# Patient Record
Sex: FEMALE | Race: BLACK OR AFRICAN AMERICAN | NOT HISPANIC OR LATINO | Employment: OTHER | URBAN - NONMETROPOLITAN AREA
[De-identification: names, ages, dates, MRNs, and addresses within clinical notes are randomized per-mention and may not be internally consistent; named-entity substitution may affect disease eponyms.]

---

## 2023-01-25 PROBLEM — D64.9 ANEMIA: Status: ACTIVE | Noted: 2023-01-25

## 2023-01-25 PROBLEM — E27.9 ADRENAL NODULE: Status: ACTIVE | Noted: 2023-01-25

## 2023-01-25 PROBLEM — E05.90 HYPERTHYROIDISM: Status: ACTIVE | Noted: 2023-01-25

## 2023-01-25 PROBLEM — K21.9 GERD (GASTROESOPHAGEAL REFLUX DISEASE): Status: ACTIVE | Noted: 2023-01-25

## 2023-01-25 PROBLEM — K62.5 RECTAL BLEED: Status: ACTIVE | Noted: 2023-01-25

## 2023-01-25 PROBLEM — M17.9 OA (OSTEOARTHRITIS) OF KNEE: Status: ACTIVE | Noted: 2023-01-25

## 2023-01-25 PROBLEM — E04.1 THYROID NODULE: Status: ACTIVE | Noted: 2023-01-25

## 2023-01-25 PROBLEM — I10 HYPERTENSION: Status: ACTIVE | Noted: 2023-01-25

## 2023-01-25 PROBLEM — M11.269 PSEUDOGOUT OF KNEE: Status: ACTIVE | Noted: 2023-01-25

## 2023-01-25 PROBLEM — I73.9 PAD (PERIPHERAL ARTERY DISEASE) (CMS-HCC): Status: ACTIVE | Noted: 2023-01-25

## 2023-01-25 PROBLEM — I48.91 ATRIAL FIBRILLATION (MULTI): Status: ACTIVE | Noted: 2023-01-25

## 2023-01-25 PROBLEM — E27.8 ADRENAL NODULE (MULTI): Status: ACTIVE | Noted: 2023-01-25

## 2023-01-25 PROBLEM — M25.551 RIGHT HIP PAIN: Status: ACTIVE | Noted: 2023-01-25

## 2023-01-25 PROBLEM — R05.3 CHRONIC COUGH: Status: ACTIVE | Noted: 2023-01-25

## 2023-01-25 RX ORDER — HYDROCHLOROTHIAZIDE 25 MG/1
TABLET ORAL
COMMUNITY
Start: 2015-03-17 | End: 2023-09-01

## 2023-01-25 RX ORDER — ATENOLOL 50 MG/1
TABLET ORAL
COMMUNITY
Start: 2017-12-08 | End: 2023-07-10

## 2023-01-25 RX ORDER — FERROUS SULFATE 325(65) MG
TABLET ORAL
COMMUNITY
Start: 2021-05-04 | End: 2023-03-20 | Stop reason: SDUPTHER

## 2023-01-25 RX ORDER — METHIMAZOLE 5 MG/1
TABLET ORAL
COMMUNITY
Start: 2022-05-02 | End: 2023-12-18 | Stop reason: WASHOUT

## 2023-01-25 RX ORDER — PANTOPRAZOLE SODIUM 40 MG/1
TABLET, DELAYED RELEASE ORAL
COMMUNITY
Start: 2014-04-30 | End: 2024-01-10

## 2023-01-25 RX ORDER — ATORVASTATIN CALCIUM 40 MG/1
TABLET, FILM COATED ORAL
COMMUNITY
Start: 2021-02-15 | End: 2023-10-26

## 2023-03-09 ENCOUNTER — OFFICE VISIT (OUTPATIENT)
Dept: PRIMARY CARE | Facility: CLINIC | Age: 83
End: 2023-03-09
Payer: MEDICARE

## 2023-03-09 DIAGNOSIS — D72.829 LEUKOCYTOSIS, UNSPECIFIED TYPE: ICD-10-CM

## 2023-03-09 PROCEDURE — 99211 OFF/OP EST MAY X REQ PHY/QHP: CPT | Performed by: FAMILY MEDICINE

## 2023-03-09 PROCEDURE — 85025 COMPLETE CBC W/AUTO DIFF WBC: CPT

## 2023-03-09 PROCEDURE — 36415 COLL VENOUS BLD VENIPUNCTURE: CPT | Performed by: FAMILY MEDICINE

## 2023-03-09 NOTE — PROGRESS NOTES
Subjective   Patient ID: Dianne Clay is a 83 y.o. female who presents for Labs Only (CBC).    CBC completed today.  No complications.    LSISETH JUANA

## 2023-03-10 ENCOUNTER — TELEPHONE (OUTPATIENT)
Dept: PRIMARY CARE | Facility: CLINIC | Age: 83
End: 2023-03-10
Payer: MEDICARE

## 2023-03-10 LAB
BASOPHILS (10*3/UL) IN BLOOD BY AUTOMATED COUNT: 0.07 X10E9/L (ref 0–0.1)
BASOPHILS/100 LEUKOCYTES IN BLOOD BY AUTOMATED COUNT: 0.8 % (ref 0–2)
EOSINOPHILS (10*3/UL) IN BLOOD BY AUTOMATED COUNT: 0.23 X10E9/L (ref 0–0.4)
EOSINOPHILS/100 LEUKOCYTES IN BLOOD BY AUTOMATED COUNT: 2.6 % (ref 0–6)
ERYTHROCYTE DISTRIBUTION WIDTH (RATIO) BY AUTOMATED COUNT: 15.6 % (ref 11.5–14.5)
ERYTHROCYTE MEAN CORPUSCULAR HEMOGLOBIN CONCENTRATION (G/DL) BY AUTOMATED: 30.2 G/DL (ref 32–36)
ERYTHROCYTE MEAN CORPUSCULAR VOLUME (FL) BY AUTOMATED COUNT: 96 FL (ref 80–100)
ERYTHROCYTES (10*6/UL) IN BLOOD BY AUTOMATED COUNT: 4.06 X10E12/L (ref 4–5.2)
HEMATOCRIT (%) IN BLOOD BY AUTOMATED COUNT: 39.1 % (ref 36–46)
HEMOGLOBIN (G/DL) IN BLOOD: 11.8 G/DL (ref 12–16)
IMMATURE GRANULOCYTES/100 LEUKOCYTES IN BLOOD BY AUTOMATED COUNT: 0.2 % (ref 0–0.9)
LEUKOCYTES (10*3/UL) IN BLOOD BY AUTOMATED COUNT: 8.8 X10E9/L (ref 4.4–11.3)
LYMPHOCYTES (10*3/UL) IN BLOOD BY AUTOMATED COUNT: 3.23 X10E9/L (ref 0.8–3)
LYMPHOCYTES/100 LEUKOCYTES IN BLOOD BY AUTOMATED COUNT: 36.7 % (ref 13–44)
MONOCYTES (10*3/UL) IN BLOOD BY AUTOMATED COUNT: 0.61 X10E9/L (ref 0.05–0.8)
MONOCYTES/100 LEUKOCYTES IN BLOOD BY AUTOMATED COUNT: 6.9 % (ref 2–10)
NEUTROPHILS (10*3/UL) IN BLOOD BY AUTOMATED COUNT: 4.64 X10E9/L (ref 1.6–5.5)
NEUTROPHILS/100 LEUKOCYTES IN BLOOD BY AUTOMATED COUNT: 52.8 % (ref 40–80)
NRBC (PER 100 WBCS) BY AUTOMATED COUNT: 0 /100 WBC (ref 0–0)
PLATELETS (10*3/UL) IN BLOOD AUTOMATED COUNT: 236 X10E9/L (ref 150–450)

## 2023-03-10 NOTE — TELEPHONE ENCOUNTER
DEBORAH for pt to contact the office.    ----- Message from Jyoti Carbajal DO sent at 3/10/2023  7:41 AM EST -----  HER WBC IS STILL HIGH BUT BACK DOWN LIKE BEFORE 12,000 AND HER DIFFERENTIAL SEMS NORMAL. IS SHE COIMG IN? DOES NOT HAVE TO IF NOT JUST FOLLOW PROBABLY DUE FOR SOME WELLNESS

## 2023-03-10 NOTE — PROGRESS NOTES
Subjective   Patient ID: Dianne Clay is a 83 y.o. female who presents for Labs Only (CBC).    HPI     Review of Systems    Objective   There were no vitals taken for this visit.    Physical Exam    Assessment/Plan

## 2023-03-13 NOTE — TELEPHONE ENCOUNTER
Patient called in and left a vm on the MA line returning call to Irish. Called patient back and left vm for her to return call to office. She is scheduled to come in 5/16/23 and is not due for her wellness until November.     Yessy Avila MA

## 2023-03-15 NOTE — TELEPHONE ENCOUNTER
Spoke with Dianne and she is aware of her results.  Will follow up in May as scheduled.  LISSETH MORGAN

## 2023-03-15 NOTE — TELEPHONE ENCOUNTER
----- Message from Jyoti Carbajal DO sent at 3/13/2023  8:06 AM EDT -----  Her cbc is about the same  so no need to see her

## 2023-03-20 DIAGNOSIS — D50.9 IRON DEFICIENCY ANEMIA, UNSPECIFIED IRON DEFICIENCY ANEMIA TYPE: Primary | ICD-10-CM

## 2023-03-20 RX ORDER — FERROUS SULFATE 325(65) MG
1 TABLET ORAL DAILY
Qty: 90 TABLET | Refills: 1 | Status: SHIPPED | OUTPATIENT
Start: 2023-03-20 | End: 2024-01-10

## 2023-03-24 ENCOUNTER — TELEPHONE (OUTPATIENT)
Dept: PRIMARY CARE | Facility: CLINIC | Age: 83
End: 2023-03-24
Payer: MEDICARE

## 2023-03-24 DIAGNOSIS — I48.91 ATRIAL FIBRILLATION, UNSPECIFIED TYPE (MULTI): ICD-10-CM

## 2023-03-24 RX ORDER — AMIODARONE HYDROCHLORIDE 200 MG/1
200 TABLET ORAL
COMMUNITY
Start: 2023-02-16 | End: 2023-03-27 | Stop reason: SDUPTHER

## 2023-03-27 RX ORDER — AMIODARONE HYDROCHLORIDE 200 MG/1
200 TABLET ORAL
Qty: 30 TABLET | Refills: 0 | Status: SHIPPED | OUTPATIENT
Start: 2023-03-27 | End: 2023-07-03

## 2023-05-16 ENCOUNTER — OFFICE VISIT (OUTPATIENT)
Dept: PRIMARY CARE | Facility: CLINIC | Age: 83
End: 2023-05-16
Payer: MEDICARE

## 2023-05-16 VITALS
BODY MASS INDEX: 22.35 KG/M2 | HEART RATE: 52 BPM | TEMPERATURE: 97 F | WEIGHT: 122.2 LBS | OXYGEN SATURATION: 97 % | DIASTOLIC BLOOD PRESSURE: 60 MMHG | SYSTOLIC BLOOD PRESSURE: 136 MMHG

## 2023-05-16 DIAGNOSIS — K21.9 GASTROESOPHAGEAL REFLUX DISEASE WITHOUT ESOPHAGITIS: ICD-10-CM

## 2023-05-16 DIAGNOSIS — D50.8 IRON DEFICIENCY ANEMIA SECONDARY TO INADEQUATE DIETARY IRON INTAKE: Primary | ICD-10-CM

## 2023-05-16 PROBLEM — R05.3 CHRONIC COUGH: Status: RESOLVED | Noted: 2023-01-25 | Resolved: 2023-05-16

## 2023-05-16 PROCEDURE — 3078F DIAST BP <80 MM HG: CPT | Performed by: FAMILY MEDICINE

## 2023-05-16 PROCEDURE — 1036F TOBACCO NON-USER: CPT | Performed by: FAMILY MEDICINE

## 2023-05-16 PROCEDURE — 3075F SYST BP GE 130 - 139MM HG: CPT | Performed by: FAMILY MEDICINE

## 2023-05-16 PROCEDURE — 1159F MED LIST DOCD IN RCRD: CPT | Performed by: FAMILY MEDICINE

## 2023-05-16 PROCEDURE — 99213 OFFICE O/P EST LOW 20 MIN: CPT | Performed by: FAMILY MEDICINE

## 2023-05-16 RX ORDER — ASPIRIN 81 MG/1
81 TABLET ORAL DAILY
COMMUNITY

## 2023-05-16 ASSESSMENT — ENCOUNTER SYMPTOMS
RHINORRHEA: 0
CONSTIPATION: 0
ACTIVITY CHANGE: 0
LIGHT-HEADEDNESS: 0
SORE THROAT: 0
DIZZINESS: 0
COUGH: 0
JOINT SWELLING: 0
PALPITATIONS: 0
FEVER: 0
WEAKNESS: 0
UNEXPECTED WEIGHT CHANGE: 0
DYSPHORIC MOOD: 0
NERVOUS/ANXIOUS: 0
WHEEZING: 0
SLEEP DISTURBANCE: 0
SINUS PRESSURE: 0
EYE DISCHARGE: 0
NAUSEA: 0
DYSURIA: 0
DIARRHEA: 0
HEMATURIA: 0
MYALGIAS: 0
APPETITE CHANGE: 0
BLOOD IN STOOL: 0
VOMITING: 0
SHORTNESS OF BREATH: 0
HEADACHES: 0
NUMBNESS: 0
ARTHRALGIAS: 0
EYE ITCHING: 0
FLANK PAIN: 0
ABDOMINAL PAIN: 0

## 2023-05-16 NOTE — PROGRESS NOTES
Subjective   Patient ID: Dianne Clay is a 83 y.o. female who presents for Follow-up (6 MONTH FOLLOW UP.HAD RECENT CBC DONE-DISCUSS RESULTS- NO OTHER CONCERNS TODAY).    HPI PATIENT HERE TO FOLLOW ON HER ANEMIA   IT IS A LOT BETTER UPON REVIEW   SHE FEELS FINE, SHE SAYS     Review of Systems   Constitutional:  Negative for activity change, appetite change, fever and unexpected weight change.   HENT:  Negative for congestion, ear pain, postnasal drip, rhinorrhea, sinus pressure and sore throat.    Eyes:  Negative for discharge, itching and visual disturbance.   Respiratory:  Negative for cough, shortness of breath and wheezing.    Cardiovascular:  Negative for chest pain, palpitations and leg swelling.   Gastrointestinal:  Negative for abdominal pain, blood in stool, constipation, diarrhea, nausea and vomiting.   Endocrine: Negative for cold intolerance, heat intolerance and polyuria.   Genitourinary:  Negative for dysuria, flank pain and hematuria.   Musculoskeletal:  Negative for arthralgias, gait problem, joint swelling and myalgias.   Skin:  Negative for rash.   Allergic/Immunologic: Negative for environmental allergies and food allergies.   Neurological:  Negative for dizziness, syncope, weakness, light-headedness, numbness and headaches.   Hematological:         ANEMIA    Psychiatric/Behavioral:  Negative for dysphoric mood and sleep disturbance. The patient is not nervous/anxious.        Objective   /60   Pulse 52   Temp 36.1 °C (97 °F) (Temporal)   Wt 55.4 kg (122 lb 3.2 oz)   LMP  (LMP Unknown)   SpO2 97%   BMI 22.35 kg/m²     Physical Exam  Constitutional:       Appearance: Normal appearance.   HENT:      Head: Normocephalic.      Mouth/Throat:      Mouth: Mucous membranes are moist.   Cardiovascular:      Rate and Rhythm: Normal rate and regular rhythm.      Pulses: Normal pulses.      Heart sounds: Normal heart sounds. No murmur heard.     No friction rub. No gallop.   Pulmonary:      Effort:  Pulmonary effort is normal. No respiratory distress.      Breath sounds: Normal breath sounds. No wheezing.   Abdominal:      General: Bowel sounds are normal. There is no distension.      Palpations: Abdomen is soft.      Tenderness: There is no abdominal tenderness.   Musculoskeletal:         General: No deformity. Normal range of motion.   Skin:     General: Skin is warm and dry.      Capillary Refill: Capillary refill takes less than 2 seconds.   Neurological:      General: No focal deficit present.      Mental Status: She is alert and oriented to person, place, and time.   Psychiatric:         Mood and Affect: Mood normal.         Assessment/Plan   Diagnoses and all orders for this visit:  Iron deficiency anemia secondary to inadequate dietary iron intake

## 2023-07-03 DIAGNOSIS — I48.91 ATRIAL FIBRILLATION, UNSPECIFIED TYPE (MULTI): ICD-10-CM

## 2023-07-03 RX ORDER — AMIODARONE HYDROCHLORIDE 200 MG/1
TABLET ORAL
Qty: 30 TABLET | Refills: 4 | Status: SHIPPED | OUTPATIENT
Start: 2023-07-03 | End: 2024-03-19

## 2023-07-10 DIAGNOSIS — I10 ESSENTIAL (PRIMARY) HYPERTENSION: ICD-10-CM

## 2023-07-10 RX ORDER — ATENOLOL 50 MG/1
TABLET ORAL
Qty: 90 TABLET | Refills: 1 | Status: SHIPPED | OUTPATIENT
Start: 2023-07-10 | End: 2024-02-06

## 2023-09-01 DIAGNOSIS — I10 ESSENTIAL (PRIMARY) HYPERTENSION: ICD-10-CM

## 2023-09-01 RX ORDER — HYDROCHLOROTHIAZIDE 25 MG/1
TABLET ORAL
Qty: 90 TABLET | Refills: 1 | Status: SHIPPED | OUTPATIENT
Start: 2023-09-01 | End: 2023-11-21 | Stop reason: WASHOUT

## 2023-10-26 DIAGNOSIS — I10 ESSENTIAL (PRIMARY) HYPERTENSION: ICD-10-CM

## 2023-10-26 RX ORDER — ATORVASTATIN CALCIUM 40 MG/1
TABLET, FILM COATED ORAL
Qty: 90 TABLET | Refills: 1 | Status: SHIPPED | OUTPATIENT
Start: 2023-10-26

## 2023-11-14 ENCOUNTER — TELEPHONE (OUTPATIENT)
Dept: PRIMARY CARE | Facility: CLINIC | Age: 83
End: 2023-11-14
Payer: MEDICARE

## 2023-11-14 NOTE — TELEPHONE ENCOUNTER
Transition of Care    Inpatient facility: [facility]  Discharge diagnosis: RECTAL BLEEDING  Discharged to: HOME  Discharge date: 11/11/23  Initial Call date: 11/14/23  Spoke with patient/caregiver:   Left Message on date : LEFT MESSAGE FOR PATIENT TO CALL BACK TO OFFICE                                                                    Do you need assistance  visits prior to your PCP visit: No  Home health care ordered: No  Have you been contacted by home care and have a start of care date: No  Are you taking medications as prescribed at discharge: Yes  Patient advised to bring all medications to PCP follow-up appointment.  Patient advised to follow discharge instructions until provider follow-up.  TCM visit date: 11/21/23  TCM provider visit with: Dr. Carbajal

## 2023-11-21 ENCOUNTER — OFFICE VISIT (OUTPATIENT)
Dept: PRIMARY CARE | Facility: CLINIC | Age: 83
End: 2023-11-21
Payer: MEDICARE

## 2023-11-21 VITALS
HEART RATE: 75 BPM | DIASTOLIC BLOOD PRESSURE: 70 MMHG | BODY MASS INDEX: 23.41 KG/M2 | OXYGEN SATURATION: 98 % | TEMPERATURE: 98 F | WEIGHT: 128 LBS | SYSTOLIC BLOOD PRESSURE: 140 MMHG

## 2023-11-21 DIAGNOSIS — R29.898 POLYARTICULAR JOINT INVOLVEMENT: ICD-10-CM

## 2023-11-21 DIAGNOSIS — M70.041 CREPITANT SYNOVITIS (ACUTE) (CHRONIC), RIGHT HAND: Primary | ICD-10-CM

## 2023-11-21 DIAGNOSIS — D50.0 IRON DEFICIENCY ANEMIA DUE TO CHRONIC BLOOD LOSS: ICD-10-CM

## 2023-11-21 PROCEDURE — 3078F DIAST BP <80 MM HG: CPT | Performed by: FAMILY MEDICINE

## 2023-11-21 PROCEDURE — 1036F TOBACCO NON-USER: CPT | Performed by: FAMILY MEDICINE

## 2023-11-21 PROCEDURE — 99214 OFFICE O/P EST MOD 30 MIN: CPT | Performed by: FAMILY MEDICINE

## 2023-11-21 PROCEDURE — 3077F SYST BP >= 140 MM HG: CPT | Performed by: FAMILY MEDICINE

## 2023-11-21 PROCEDURE — 1159F MED LIST DOCD IN RCRD: CPT | Performed by: FAMILY MEDICINE

## 2023-11-21 RX ORDER — ACETAMINOPHEN AND CODEINE PHOSPHATE 300; 30 MG/1; MG/1
1 TABLET ORAL EVERY 6 HOURS PRN
Qty: 20 TABLET | Refills: 0 | Status: SHIPPED | OUTPATIENT
Start: 2023-11-21 | End: 2023-11-28

## 2023-11-21 ASSESSMENT — ENCOUNTER SYMPTOMS
PALPITATIONS: 0
SINUS PRESSURE: 0
MYALGIAS: 1
SLEEP DISTURBANCE: 0
BLOOD IN STOOL: 1
LIGHT-HEADEDNESS: 0
SHORTNESS OF BREATH: 0
CONSTIPATION: 0
ANAL BLEEDING: 1
DIZZINESS: 0
NERVOUS/ANXIOUS: 0
HEADACHES: 0
NUMBNESS: 0
APPETITE CHANGE: 1
UNEXPECTED WEIGHT CHANGE: 0
WEAKNESS: 0
FATIGUE: 1
FEVER: 0
NAUSEA: 0
JOINT SWELLING: 1
ABDOMINAL PAIN: 0
EYE DISCHARGE: 0
DYSPHORIC MOOD: 0
HEMATURIA: 0
SORE THROAT: 0
ABDOMINAL DISTENTION: 1
DYSURIA: 0
RHINORRHEA: 0
VOMITING: 0
ARTHRALGIAS: 1
EYE ITCHING: 0
DIARRHEA: 0
COUGH: 0
WHEEZING: 0
ACTIVITY CHANGE: 0
FLANK PAIN: 0

## 2023-11-21 NOTE — PATIENT INSTRUCTIONS
SENT PAIN PILLS WITH JUNIE  IF WORSE BEFORE OUR NEXT APPOINTMENT GO TO [facility] ER FOR CARE

## 2023-11-21 NOTE — PROGRESS NOTES
Subjective   Patient ID: Dianne Clay is a 83 y.o. female who presents for Hospital Follow-up (11/9/2023 Rectal bleeding.  Bilateral leg pain and R arm pain./Current concerns leg swelling and R arm swelling. Difficulty picking up things after blood transfusion.).    HPI patient here for post hospital visit but she c/o in tears arm pain and swelling and bilateral knee pain and swelling /CAN'T WALK USING CANE   NOF NOTE IN REVIEW OF HER HOSPITAL RECORD FOR GI BLEED SHE REQUIRED BLOOD TRANSFUSIONS (3?) AND STILL HAD LOW IRON AND H-H  SHE WAS ON ELIQUIS FOR A FIB BUT THIS IS STOPPED NOW   SHE IS ON IRON     Review of Systems   Constitutional:  Positive for appetite change and fatigue. Negative for activity change, fever and unexpected weight change.   HENT:  Negative for congestion, ear pain, postnasal drip, rhinorrhea, sinus pressure and sore throat.    Eyes:  Negative for discharge, itching and visual disturbance.   Respiratory:  Negative for cough, shortness of breath and wheezing.    Cardiovascular:  Negative for chest pain, palpitations and leg swelling.   Gastrointestinal:  Positive for abdominal distention, anal bleeding and blood in stool. Negative for abdominal pain, constipation, diarrhea, nausea and vomiting.        SEE HPI  HAD SCOPE BUT CAN NOT FIND RESULTS IN RECORD BUT WILL FOLLOW WITH DR MAYORGA NEXT WEEK    Endocrine: Negative for cold intolerance, heat intolerance and polyuria.   Genitourinary:  Negative for dysuria, flank pain and hematuria.   Musculoskeletal:  Positive for arthralgias, gait problem, joint swelling and myalgias.   Skin:  Positive for pallor. Negative for rash.   Allergic/Immunologic: Negative for environmental allergies and food allergies.   Neurological:  Negative for dizziness, syncope, weakness, light-headedness, numbness and headaches.   Hematological:         SEVERE ANEMIA POST GI BLEED    Psychiatric/Behavioral:  Negative for dysphoric mood and sleep disturbance. The patient  "is not nervous/anxious.        Objective   /70   Pulse 75   Temp 36.7 °C (98 °F)   Wt 58.1 kg (128 lb)   LMP  (LMP Unknown)   SpO2 98%   BMI 23.41 kg/m²     Physical Exam  Vitals and nursing note reviewed.   Constitutional:       General: She is in acute distress.      Appearance: Normal appearance. She is ill-appearing.   HENT:      Head: Normocephalic.      Mouth/Throat:      Mouth: Mucous membranes are moist.   Cardiovascular:      Rate and Rhythm: Normal rate and regular rhythm.      Pulses: Normal pulses.      Heart sounds: Normal heart sounds. No murmur heard.     No friction rub. No gallop.   Pulmonary:      Effort: Pulmonary effort is normal. No respiratory distress.      Breath sounds: Normal breath sounds. No wheezing.   Abdominal:      General: Bowel sounds are normal. There is no distension.      Palpations: Abdomen is soft.      Tenderness: There is no abdominal tenderness.   Musculoskeletal:         General: Swelling, tenderness and deformity present.      Comments: BOTH KNEES AND RIGHT ARM AT THE WRIST SWOLLEN AND WARM TO TOUCH   CAUSE UNKNOWN   ASKED TO DO BLOOD WORK AND PATIENT DECLINED (AGAIN IN TEARS)   ASKED TO GO BACK TO HOSPITAL AND SHE AGAIN DECLINED   \"CAN'T YOU JUST GIVE ME SOMETHING FOR PAIN BESIDE JUST TYLENOL?\"   Skin:     General: Skin is warm and dry.      Capillary Refill: Capillary refill takes less than 2 seconds.   Neurological:      General: No focal deficit present.      Mental Status: She is alert and oriented to person, place, and time. Mental status is at baseline.      Motor: Weakness present.      Gait: Gait abnormal.   Psychiatric:         Mood and Affect: Mood normal.      Comments: PATIENT IN PAIN 10/10 AND IN TEARS          Assessment/Plan   Diagnoses and all orders for this visit:  Crepitant synovitis (acute) (chronic), right hand  -     acetaminophen-codeine (Tylenol w/ Codeine #3) 300-30 mg tablet; Take 1 tablet by mouth every 6 hours if needed for severe " pain (7 - 10) for up to 7 days.  Polyarticular joint involvement  Iron deficiency anemia due to chronic blood loss

## 2023-11-28 ENCOUNTER — APPOINTMENT (OUTPATIENT)
Dept: PRIMARY CARE | Facility: CLINIC | Age: 83
End: 2023-11-28
Payer: MEDICARE

## 2023-12-12 ENCOUNTER — APPOINTMENT (OUTPATIENT)
Dept: PRIMARY CARE | Facility: CLINIC | Age: 83
End: 2023-12-12
Payer: MEDICARE

## 2023-12-18 ENCOUNTER — OFFICE VISIT (OUTPATIENT)
Dept: PRIMARY CARE | Facility: CLINIC | Age: 83
End: 2023-12-18
Payer: MEDICARE

## 2023-12-18 VITALS
HEART RATE: 85 BPM | TEMPERATURE: 98.5 F | WEIGHT: 118 LBS | DIASTOLIC BLOOD PRESSURE: 56 MMHG | BODY MASS INDEX: 21.58 KG/M2 | SYSTOLIC BLOOD PRESSURE: 114 MMHG | OXYGEN SATURATION: 99 %

## 2023-12-18 DIAGNOSIS — M19.049 HAND ARTHRITIS: Primary | ICD-10-CM

## 2023-12-18 DIAGNOSIS — K62.5 RECTAL BLEED: ICD-10-CM

## 2023-12-18 PROCEDURE — 3078F DIAST BP <80 MM HG: CPT | Performed by: FAMILY MEDICINE

## 2023-12-18 PROCEDURE — 3074F SYST BP LT 130 MM HG: CPT | Performed by: FAMILY MEDICINE

## 2023-12-18 PROCEDURE — 99213 OFFICE O/P EST LOW 20 MIN: CPT | Performed by: FAMILY MEDICINE

## 2023-12-18 PROCEDURE — 1036F TOBACCO NON-USER: CPT | Performed by: FAMILY MEDICINE

## 2023-12-18 PROCEDURE — 1159F MED LIST DOCD IN RCRD: CPT | Performed by: FAMILY MEDICINE

## 2023-12-18 ASSESSMENT — ENCOUNTER SYMPTOMS
NAUSEA: 0
NERVOUS/ANXIOUS: 0
ANAL BLEEDING: 1
SINUS PRESSURE: 0
ACTIVITY CHANGE: 0
FLANK PAIN: 0
PALPITATIONS: 0
DYSPHORIC MOOD: 0
MYALGIAS: 0
BLOOD IN STOOL: 1
FEVER: 0
WHEEZING: 0
JOINT SWELLING: 0
SLEEP DISTURBANCE: 0
HEADACHES: 0
CONSTIPATION: 0
ABDOMINAL PAIN: 0
EYE DISCHARGE: 0
COUGH: 0
RHINORRHEA: 0
LIGHT-HEADEDNESS: 0
FATIGUE: 1
VOMITING: 0
ARTHRALGIAS: 1
APPETITE CHANGE: 0
DYSURIA: 0
NUMBNESS: 0
WEAKNESS: 0
SHORTNESS OF BREATH: 0
SORE THROAT: 0
UNEXPECTED WEIGHT CHANGE: 0
ABDOMINAL DISTENTION: 1
DIZZINESS: 0
HEMATURIA: 0
DIARRHEA: 0
EYE ITCHING: 0

## 2023-12-18 NOTE — PROGRESS NOTES
Subjective   Patient ID: Dianne Clay is a 83 y.o. female who presents for Hospital Follow-up (RECTAL BLEEDING AND LEG SWELLING.  SCOPE COMPLETED- NOTHING SIGNIFICANT FOUND. REFERRED TO SEE DR. LEVY NEXT MONTH. CONTINUED RECTAL BLEEDING.  SHE ALSO STATES SHE HAS AFIB AND SHE IS SUPPOSED TO HAVE THE WATCHMAN AND DEFIB PLACED. ).    HPI FEELS BETTER THAN BEFORE THE HOSPITAL. HER LEFT HAND HURTS     Review of Systems   Constitutional:  Positive for fatigue. Negative for activity change, appetite change, fever and unexpected weight change.   HENT:  Negative for congestion, ear pain, postnasal drip, rhinorrhea, sinus pressure and sore throat.    Eyes:  Negative for discharge, itching and visual disturbance.   Respiratory:  Negative for cough, shortness of breath and wheezing.    Cardiovascular:  Negative for chest pain, palpitations and leg swelling.   Gastrointestinal:  Positive for abdominal distention, anal bleeding and blood in stool. Negative for abdominal pain, constipation, diarrhea, nausea and vomiting.   Endocrine: Negative for cold intolerance, heat intolerance and polyuria.   Genitourinary:  Negative for dysuria, flank pain and hematuria.   Musculoskeletal:  Positive for arthralgias. Negative for gait problem, joint swelling and myalgias.   Skin:  Negative for rash.   Allergic/Immunologic: Negative for environmental allergies and food allergies.   Neurological:  Negative for dizziness, syncope, weakness, light-headedness, numbness and headaches.   Psychiatric/Behavioral:  Negative for dysphoric mood and sleep disturbance. The patient is not nervous/anxious.        Objective   /56   Pulse 85   Temp 36.9 °C (98.5 °F)   Wt 53.5 kg (118 lb)   LMP  (LMP Unknown)   SpO2 99%   BMI 21.58 kg/m²     Physical Exam  Vitals and nursing note reviewed.   Constitutional:       Appearance: Normal appearance.   HENT:      Head: Normocephalic.      Mouth/Throat:      Mouth: Mucous membranes are moist.    Cardiovascular:      Rate and Rhythm: Normal rate and regular rhythm.      Pulses: Normal pulses.      Heart sounds: Normal heart sounds. No murmur heard.     No friction rub. No gallop.   Pulmonary:      Effort: Pulmonary effort is normal. No respiratory distress.      Breath sounds: Normal breath sounds. No wheezing.   Abdominal:      General: Bowel sounds are normal. There is no distension.      Palpations: Abdomen is soft.      Tenderness: There is no abdominal tenderness.   Musculoskeletal:         General: Swelling, tenderness and deformity present.      Comments: LEFT WRIST AND PIP JOINTS OF LEFT HAND ARE INFLAMED AND TENDER   SHE IS USING [employer] ON THE HAND AND HEAT    Skin:     General: Skin is warm and dry.      Capillary Refill: Capillary refill takes less than 2 seconds.   Neurological:      General: No focal deficit present.      Mental Status: She is alert and oriented to person, place, and time.   Psychiatric:         Mood and Affect: Mood normal.         Assessment/Plan   Diagnoses and all orders for this visit:  Hand arthritis  Rectal bleed

## 2024-01-03 ENCOUNTER — TELEPHONE (OUTPATIENT)
Dept: PRIMARY CARE | Facility: CLINIC | Age: 84
End: 2024-01-03
Payer: MEDICARE

## 2024-01-03 DIAGNOSIS — I48.91 ATRIAL FIBRILLATION, UNSPECIFIED TYPE (MULTI): Primary | ICD-10-CM

## 2024-01-03 NOTE — TELEPHONE ENCOUNTER
ABY STOLL patient has appointment in March with cardiology do you want her on Eliquis until then?   #[phone]

## 2024-01-04 NOTE — TELEPHONE ENCOUNTER
[facility] TO STAY ON ELIQUIS UNTIL JUANIS SEES CARDIOLOGY. RX SENT TO [facility]

## 2024-01-10 DIAGNOSIS — K21.9 GASTROESOPHAGEAL REFLUX DISEASE WITHOUT ESOPHAGITIS: Primary | ICD-10-CM

## 2024-01-10 DIAGNOSIS — D50.9 IRON DEFICIENCY ANEMIA, UNSPECIFIED IRON DEFICIENCY ANEMIA TYPE: ICD-10-CM

## 2024-01-10 RX ORDER — PANTOPRAZOLE SODIUM 40 MG/1
TABLET, DELAYED RELEASE ORAL
Qty: 30 TABLET | Refills: 2 | Status: SHIPPED | OUTPATIENT
Start: 2024-01-10 | End: 2024-04-29

## 2024-01-10 RX ORDER — FERROUS SULFATE TAB 325 MG (65 MG ELEMENTAL FE) 325 (65 FE) MG
1 TAB ORAL DAILY
Qty: 90 TABLET | Refills: 2 | Status: SHIPPED | OUTPATIENT
Start: 2024-01-10

## 2024-02-05 DIAGNOSIS — I10 ESSENTIAL (PRIMARY) HYPERTENSION: ICD-10-CM

## 2024-02-06 RX ORDER — ATENOLOL 50 MG/1
TABLET ORAL
Qty: 90 TABLET | Refills: 1 | Status: SHIPPED | OUTPATIENT
Start: 2024-02-06

## 2024-02-23 DIAGNOSIS — I48.91 ATRIAL FIBRILLATION, UNSPECIFIED TYPE (MULTI): ICD-10-CM

## 2024-02-23 RX ORDER — APIXABAN 2.5 MG/1
2.5 TABLET, FILM COATED ORAL DAILY
Qty: 90 TABLET | Refills: 0 | Status: SHIPPED | OUTPATIENT
Start: 2024-02-23 | End: 2024-03-01 | Stop reason: SDUPTHER

## 2024-02-27 DIAGNOSIS — I48.91 ATRIAL FIBRILLATION, UNSPECIFIED TYPE (MULTI): ICD-10-CM

## 2024-03-01 DIAGNOSIS — I48.91 ATRIAL FIBRILLATION, UNSPECIFIED TYPE (MULTI): ICD-10-CM

## 2024-03-12 ENCOUNTER — TELEPHONE (OUTPATIENT)
Dept: PRIMARY CARE | Facility: CLINIC | Age: 84
End: 2024-03-12
Payer: MEDICARE

## 2024-03-12 DIAGNOSIS — I48.91 ATRIAL FIBRILLATION, UNSPECIFIED TYPE (MULTI): ICD-10-CM

## 2024-03-12 NOTE — TELEPHONE ENCOUNTER
Macy from [facility] called regarding the script they received for the eliquis 2.5 mg. She states it was sent over as once daily but typically this dose it BID. Asking if this is correct, patient should take one 2.5 mg tablet daily.

## 2024-03-18 DIAGNOSIS — I48.91 ATRIAL FIBRILLATION, UNSPECIFIED TYPE (MULTI): ICD-10-CM

## 2024-03-19 RX ORDER — AMIODARONE HYDROCHLORIDE 200 MG/1
TABLET ORAL
Qty: 30 TABLET | Refills: 4 | Status: SHIPPED | OUTPATIENT
Start: 2024-03-19

## 2024-03-21 DIAGNOSIS — E05.90 THYROTOXICOSIS, UNSPECIFIED WITHOUT THYROTOXIC CRISIS OR STORM: ICD-10-CM

## 2024-03-22 RX ORDER — METHIMAZOLE 5 MG/1
5 TABLET ORAL DAILY
Qty: 30 TABLET | Refills: 8 | Status: SHIPPED | OUTPATIENT
Start: 2024-03-22

## 2024-04-25 DIAGNOSIS — D50.9 IRON DEFICIENCY ANEMIA, UNSPECIFIED IRON DEFICIENCY ANEMIA TYPE: ICD-10-CM

## 2024-04-25 DIAGNOSIS — K21.9 GASTROESOPHAGEAL REFLUX DISEASE WITHOUT ESOPHAGITIS: ICD-10-CM

## 2024-04-29 RX ORDER — PANTOPRAZOLE SODIUM 40 MG/1
TABLET, DELAYED RELEASE ORAL
Qty: 30 TABLET | Refills: 2 | Status: SHIPPED | OUTPATIENT
Start: 2024-04-29

## 2024-07-21 DIAGNOSIS — K21.9 GASTROESOPHAGEAL REFLUX DISEASE WITHOUT ESOPHAGITIS: ICD-10-CM

## 2024-07-21 DIAGNOSIS — D50.9 IRON DEFICIENCY ANEMIA, UNSPECIFIED IRON DEFICIENCY ANEMIA TYPE: ICD-10-CM

## 2024-07-23 RX ORDER — PANTOPRAZOLE SODIUM 40 MG/1
TABLET, DELAYED RELEASE ORAL
Qty: 30 TABLET | Refills: 2 | Status: SHIPPED | OUTPATIENT
Start: 2024-07-23

## 2024-08-21 DIAGNOSIS — I48.91 ATRIAL FIBRILLATION, UNSPECIFIED TYPE (MULTI): ICD-10-CM

## 2024-08-21 RX ORDER — AMIODARONE HYDROCHLORIDE 200 MG/1
TABLET ORAL
Qty: 30 TABLET | Refills: 4 | Status: SHIPPED | OUTPATIENT
Start: 2024-08-21

## 2024-09-03 ENCOUNTER — HOSPITAL ENCOUNTER (EMERGENCY)
Facility: HOSPITAL | Age: 84
Discharge: ANOTHER HOSPITAL | End: 2024-09-04
Attending: EMERGENCY MEDICINE
Payer: MEDICARE

## 2024-09-03 ENCOUNTER — APPOINTMENT (OUTPATIENT)
Dept: RADIOLOGY | Facility: HOSPITAL | Age: 84
End: 2024-09-03
Payer: MEDICARE

## 2024-09-03 DIAGNOSIS — K92.2 LOWER GI BLEED: Primary | ICD-10-CM
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
## 2024-09-03 DIAGNOSIS — Z79.01 ANTICOAGULATED: ICD-10-CM

## 2024-09-03 DIAGNOSIS — S01.81XA FACIAL LACERATION, INITIAL ENCOUNTER: ICD-10-CM

## 2024-09-03 LAB
ABO GROUP (TYPE) IN BLOOD: NORMAL
ALBUMIN SERPL BCP-MCNC: 2.9 G/DL (ref 3.4–5)
ALP SERPL-CCNC: 77 U/L (ref 33–136)
ALT SERPL W P-5'-P-CCNC: 5 U/L (ref 7–45)
ANION GAP SERPL CALC-SCNC: 14 MMOL/L (ref 10–20)
ANTIBODY SCREEN: NORMAL
AST SERPL W P-5'-P-CCNC: 11 U/L (ref 9–39)
BASOPHILS # BLD AUTO: 0.05 X10*3/UL (ref 0–0.1)
BASOPHILS NFR BLD AUTO: 0.4 %
BILIRUB SERPL-MCNC: 0.8 MG/DL (ref 0–1.2)
BUN SERPL-MCNC: 25 MG/DL (ref 6–23)
CALCIUM SERPL-MCNC: 8.5 MG/DL (ref 8.6–10.3)
CHLORIDE SERPL-SCNC: 106 MMOL/L (ref 98–107)
CO2 SERPL-SCNC: 21 MMOL/L (ref 21–32)
CREAT SERPL-MCNC: 1.02 MG/DL (ref 0.5–1.05)
EGFRCR SERPLBLD CKD-EPI 2021: 55 ML/MIN/1.73M*2
EOSINOPHIL # BLD AUTO: 0.07 X10*3/UL (ref 0–0.4)
EOSINOPHIL NFR BLD AUTO: 0.5 %
ERYTHROCYTE [DISTWIDTH] IN BLOOD BY AUTOMATED COUNT: 18.1 % (ref 11.5–14.5)
GLUCOSE BLD MANUAL STRIP-MCNC: 142 MG/DL (ref 74–99)
GLUCOSE SERPL-MCNC: 158 MG/DL (ref 74–99)
HCT VFR BLD AUTO: 26.4 % (ref 36–46)
HGB BLD-MCNC: 8.4 G/DL (ref 12–16)
IMM GRANULOCYTES # BLD AUTO: 0.05 X10*3/UL (ref 0–0.5)
IMM GRANULOCYTES NFR BLD AUTO: 0.4 % (ref 0–0.9)
LYMPHOCYTES # BLD AUTO: 1.89 X10*3/UL (ref 0.8–3)
LYMPHOCYTES NFR BLD AUTO: 14.7 %
MCH RBC QN AUTO: 29 PG (ref 26–34)
MCHC RBC AUTO-ENTMCNC: 31.8 G/DL (ref 32–36)
MCV RBC AUTO: 91 FL (ref 80–100)
MONOCYTES # BLD AUTO: 0.58 X10*3/UL (ref 0.05–0.8)
MONOCYTES NFR BLD AUTO: 4.5 %
NEUTROPHILS # BLD AUTO: 10.21 X10*3/UL (ref 1.6–5.5)
NEUTROPHILS NFR BLD AUTO: 79.5 %
NRBC BLD-RTO: 0 /100 WBCS (ref 0–0)
PLATELET # BLD AUTO: 196 X10*3/UL (ref 150–450)
POTASSIUM SERPL-SCNC: 3.7 MMOL/L (ref 3.5–5.3)
PROT SERPL-MCNC: 7.8 G/DL (ref 6.4–8.2)
RBC # BLD AUTO: 2.9 X10*6/UL (ref 4–5.2)
RH FACTOR (ANTIGEN D): NORMAL
SODIUM SERPL-SCNC: 137 MMOL/L (ref 136–145)
WBC # BLD AUTO: 12.9 X10*3/UL (ref 4.4–11.3)

## 2024-09-03 PROCEDURE — 70450 CT HEAD/BRAIN W/O DYE: CPT | Performed by: RADIOLOGY

## 2024-09-03 PROCEDURE — 36415 COLL VENOUS BLD VENIPUNCTURE: CPT | Performed by: EMERGENCY MEDICINE

## 2024-09-03 PROCEDURE — 82947 ASSAY GLUCOSE BLOOD QUANT: CPT | Mod: 59

## 2024-09-03 PROCEDURE — 72125 CT NECK SPINE W/O DYE: CPT

## 2024-09-03 PROCEDURE — 86901 BLOOD TYPING SEROLOGIC RH(D): CPT | Performed by: EMERGENCY MEDICINE

## 2024-09-03 PROCEDURE — 72125 CT NECK SPINE W/O DYE: CPT | Performed by: RADIOLOGY

## 2024-09-03 PROCEDURE — 86920 COMPATIBILITY TEST SPIN: CPT

## 2024-09-03 PROCEDURE — 99285 EMERGENCY DEPT VISIT HI MDM: CPT

## 2024-09-03 PROCEDURE — 70450 CT HEAD/BRAIN W/O DYE: CPT

## 2024-09-03 PROCEDURE — 85025 COMPLETE CBC W/AUTO DIFF WBC: CPT | Performed by: EMERGENCY MEDICINE

## 2024-09-03 PROCEDURE — 12011 RPR F/E/E/N/L/M 2.5 CM/<: CPT | Performed by: EMERGENCY MEDICINE

## 2024-09-03 PROCEDURE — 80053 COMPREHEN METABOLIC PANEL: CPT | Performed by: EMERGENCY MEDICINE

## 2024-09-03 ASSESSMENT — COLUMBIA-SUICIDE SEVERITY RATING SCALE - C-SSRS
6. HAVE YOU EVER DONE ANYTHING, STARTED TO DO ANYTHING, OR PREPARED TO DO ANYTHING TO END YOUR LIFE?: NO
1. IN THE PAST MONTH, HAVE YOU WISHED YOU WERE DEAD OR WISHED YOU COULD GO TO SLEEP AND NOT WAKE UP?: NO
2. HAVE YOU ACTUALLY HAD ANY THOUGHTS OF KILLING YOURSELF?: NO

## 2024-09-03 ASSESSMENT — PAIN - FUNCTIONAL ASSESSMENT: PAIN_FUNCTIONAL_ASSESSMENT: 0-10

## 2024-09-03 ASSESSMENT — PAIN SCALES - GENERAL: PAINLEVEL_OUTOF10: 0 - NO PAIN

## 2024-09-04 ENCOUNTER — TELEPHONE (OUTPATIENT)
Dept: EMERGENCY MEDICINE | Facility: HOSPITAL | Age: 84
End: 2024-09-04
Payer: MEDICARE

## 2024-09-04 ENCOUNTER — HOSPITAL ENCOUNTER (INPATIENT)
Facility: HOSPITAL | Age: 84
LOS: 4 days | Discharge: HOME | End: 2024-09-08
Attending: INTERNAL MEDICINE | Admitting: INTERNAL MEDICINE
Payer: MEDICARE

## 2024-09-04 VITALS
WEIGHT: 115 LBS | HEIGHT: 65 IN | RESPIRATION RATE: 16 BRPM | HEART RATE: 71 BPM | OXYGEN SATURATION: 99 % | BODY MASS INDEX: 19.16 KG/M2 | SYSTOLIC BLOOD PRESSURE: 125 MMHG | TEMPERATURE: 97.2 F | DIASTOLIC BLOOD PRESSURE: 66 MMHG

## 2024-09-04 DIAGNOSIS — I48.91 ATRIAL FIBRILLATION, UNSPECIFIED TYPE (MULTI): ICD-10-CM

## 2024-09-04 DIAGNOSIS — K92.2 GI BLEED: Primary | ICD-10-CM

## 2024-09-04 LAB
ABO GROUP (TYPE) IN BLOOD: NORMAL
ABO GROUP (TYPE) IN BLOOD: NORMAL
ALBUMIN SERPL BCP-MCNC: 2.8 G/DL (ref 3.4–5)
ALP SERPL-CCNC: 73 U/L (ref 33–136)
ALT SERPL W P-5'-P-CCNC: 5 U/L (ref 7–45)
ANION GAP SERPL CALC-SCNC: 17 MMOL/L (ref 10–20)
ANTIBODY SCREEN: NORMAL
APTT PPP: 27 SECONDS (ref 27–38)
AST SERPL W P-5'-P-CCNC: 12 U/L (ref 9–39)
BASOPHILS # BLD AUTO: 0.05 X10*3/UL (ref 0–0.1)
BASOPHILS NFR BLD AUTO: 0.4 %
BILIRUB SERPL-MCNC: 1.1 MG/DL (ref 0–1.2)
BLOOD EXPIRATION DATE: NORMAL
BUN SERPL-MCNC: 26 MG/DL (ref 6–23)
CALCIUM SERPL-MCNC: 7.5 MG/DL (ref 8.6–10.3)
CHLORIDE SERPL-SCNC: 108 MMOL/L (ref 98–107)
CO2 SERPL-SCNC: 19 MMOL/L (ref 21–32)
CREAT SERPL-MCNC: 0.73 MG/DL (ref 0.5–1.05)
DISPENSE STATUS: NORMAL
EGFRCR SERPLBLD CKD-EPI 2021: 82 ML/MIN/1.73M*2
EOSINOPHIL # BLD AUTO: 0.06 X10*3/UL (ref 0–0.4)
EOSINOPHIL NFR BLD AUTO: 0.5 %
ERYTHROCYTE [DISTWIDTH] IN BLOOD BY AUTOMATED COUNT: 17.5 % (ref 11.5–14.5)
GLUCOSE SERPL-MCNC: 90 MG/DL (ref 74–99)
HCT VFR BLD AUTO: 24.1 % (ref 36–46)
HCT VFR BLD AUTO: 27.6 % (ref 36–46)
HCT VFR BLD AUTO: 29.3 % (ref 36–46)
HGB BLD-MCNC: 8.5 G/DL (ref 12–16)
HGB BLD-MCNC: 9.1 G/DL (ref 12–16)
HGB BLD-MCNC: 9.5 G/DL (ref 12–16)
IMM GRANULOCYTES # BLD AUTO: 0.05 X10*3/UL (ref 0–0.5)
IMM GRANULOCYTES NFR BLD AUTO: 0.4 % (ref 0–0.9)
INR PPP: 1.1 (ref 0.9–1.1)
LYMPHOCYTES # BLD AUTO: 2.17 X10*3/UL (ref 0.8–3)
LYMPHOCYTES NFR BLD AUTO: 18.2 %
MCH RBC QN AUTO: 29.2 PG (ref 26–34)
MCHC RBC AUTO-ENTMCNC: 32.4 G/DL (ref 32–36)
MCV RBC AUTO: 90 FL (ref 80–100)
MONOCYTES # BLD AUTO: 0.72 X10*3/UL (ref 0.05–0.8)
MONOCYTES NFR BLD AUTO: 6 %
NEUTROPHILS # BLD AUTO: 8.86 X10*3/UL (ref 1.6–5.5)
NEUTROPHILS NFR BLD AUTO: 74.5 %
NRBC BLD-RTO: 0 /100 WBCS (ref 0–0)
PLATELET # BLD AUTO: 197 X10*3/UL (ref 150–450)
POTASSIUM SERPL-SCNC: 4 MMOL/L (ref 3.5–5.3)
PRODUCT BLOOD TYPE: 6200
PRODUCT CODE: NORMAL
PROT SERPL-MCNC: 7.2 G/DL (ref 6.4–8.2)
PROTHROMBIN TIME: 12.8 SECONDS (ref 9.8–12.8)
RBC # BLD AUTO: 3.25 X10*6/UL (ref 4–5.2)
RH FACTOR (ANTIGEN D): NORMAL
RH FACTOR (ANTIGEN D): NORMAL
SODIUM SERPL-SCNC: 140 MMOL/L (ref 136–145)
UNIT ABO: NORMAL
UNIT NUMBER: NORMAL
UNIT RH: NORMAL
UNIT VOLUME: 350
WBC # BLD AUTO: 11.9 X10*3/UL (ref 4.4–11.3)
XM INTEP: NORMAL

## 2024-09-04 PROCEDURE — 86901 BLOOD TYPING SEROLOGIC RH(D): CPT | Performed by: NURSE PRACTITIONER

## 2024-09-04 PROCEDURE — 1200000002 HC GENERAL ROOM WITH TELEMETRY DAILY

## 2024-09-04 PROCEDURE — 84075 ASSAY ALKALINE PHOSPHATASE: CPT | Performed by: NURSE PRACTITIONER

## 2024-09-04 PROCEDURE — 2500000004 HC RX 250 GENERAL PHARMACY W/ HCPCS (ALT 636 FOR OP/ED): Performed by: NURSE PRACTITIONER

## 2024-09-04 PROCEDURE — 85014 HEMATOCRIT: CPT | Performed by: NURSE PRACTITIONER

## 2024-09-04 PROCEDURE — 0DJD8ZZ INSPECTION OF LOWER INTESTINAL TRACT, VIA NATURAL OR ARTIFICIAL OPENING ENDOSCOPIC: ICD-10-PCS | Performed by: INTERNAL MEDICINE

## 2024-09-04 PROCEDURE — P9016 RBC LEUKOCYTES REDUCED: HCPCS

## 2024-09-04 PROCEDURE — 85025 COMPLETE CBC W/AUTO DIFF WBC: CPT | Performed by: NURSE PRACTITIONER

## 2024-09-04 PROCEDURE — 2500000001 HC RX 250 WO HCPCS SELF ADMINISTERED DRUGS (ALT 637 FOR MEDICARE OP): Performed by: NURSE PRACTITIONER

## 2024-09-04 PROCEDURE — 36415 COLL VENOUS BLD VENIPUNCTURE: CPT | Performed by: EMERGENCY MEDICINE

## 2024-09-04 PROCEDURE — 36430 TRANSFUSION BLD/BLD COMPNT: CPT

## 2024-09-04 PROCEDURE — 99223 1ST HOSP IP/OBS HIGH 75: CPT | Performed by: NURSE PRACTITIONER

## 2024-09-04 PROCEDURE — 99222 1ST HOSP IP/OBS MODERATE 55: CPT | Performed by: INTERNAL MEDICINE

## 2024-09-04 PROCEDURE — 36415 COLL VENOUS BLD VENIPUNCTURE: CPT | Performed by: NURSE PRACTITIONER

## 2024-09-04 PROCEDURE — 85610 PROTHROMBIN TIME: CPT | Performed by: NURSE PRACTITIONER

## 2024-09-04 PROCEDURE — 85014 HEMATOCRIT: CPT | Performed by: EMERGENCY MEDICINE

## 2024-09-04 RX ORDER — ACETAMINOPHEN 650 MG/1
650 SUPPOSITORY RECTAL EVERY 4 HOURS PRN
Status: DISCONTINUED | OUTPATIENT
Start: 2024-09-04 | End: 2024-09-04

## 2024-09-04 RX ORDER — METHIMAZOLE 5 MG/1
5 TABLET ORAL DAILY
Status: DISCONTINUED | OUTPATIENT
Start: 2024-09-05 | End: 2024-09-08 | Stop reason: HOSPADM

## 2024-09-04 RX ORDER — FERROUS SULFATE 325(65) MG
1 TABLET ORAL DAILY
Status: DISCONTINUED | OUTPATIENT
Start: 2024-09-05 | End: 2024-09-08 | Stop reason: HOSPADM

## 2024-09-04 RX ORDER — PANTOPRAZOLE SODIUM 40 MG/10ML
40 INJECTION, POWDER, LYOPHILIZED, FOR SOLUTION INTRAVENOUS 2 TIMES DAILY
Status: DISCONTINUED | OUTPATIENT
Start: 2024-09-04 | End: 2024-09-08

## 2024-09-04 RX ORDER — ASPIRIN 81 MG/1
81 TABLET ORAL DAILY
Status: DISCONTINUED | OUTPATIENT
Start: 2024-09-04 | End: 2024-09-08 | Stop reason: HOSPADM

## 2024-09-04 RX ORDER — ATORVASTATIN CALCIUM 40 MG/1
40 TABLET, FILM COATED ORAL DAILY
Status: DISCONTINUED | OUTPATIENT
Start: 2024-09-04 | End: 2024-09-08 | Stop reason: HOSPADM

## 2024-09-04 RX ORDER — ACETAMINOPHEN 160 MG/5ML
650 SOLUTION ORAL EVERY 4 HOURS PRN
Status: DISCONTINUED | OUTPATIENT
Start: 2024-09-04 | End: 2024-09-08 | Stop reason: HOSPADM

## 2024-09-04 RX ORDER — ATENOLOL 50 MG/1
50 TABLET ORAL DAILY
Status: DISCONTINUED | OUTPATIENT
Start: 2024-09-05 | End: 2024-09-08 | Stop reason: HOSPADM

## 2024-09-04 RX ORDER — AMIODARONE HYDROCHLORIDE 200 MG/1
200 TABLET ORAL DAILY
Status: DISCONTINUED | OUTPATIENT
Start: 2024-09-05 | End: 2024-09-08 | Stop reason: HOSPADM

## 2024-09-04 RX ORDER — ACETAMINOPHEN 325 MG/1
650 TABLET ORAL EVERY 4 HOURS PRN
Status: DISCONTINUED | OUTPATIENT
Start: 2024-09-04 | End: 2024-09-08 | Stop reason: HOSPADM

## 2024-09-04 RX ADMIN — PANTOPRAZOLE SODIUM 40 MG: 40 INJECTION, POWDER, FOR SOLUTION INTRAVENOUS at 21:54

## 2024-09-04 RX ADMIN — PANTOPRAZOLE SODIUM 40 MG: 40 INJECTION, POWDER, FOR SOLUTION INTRAVENOUS at 14:25

## 2024-09-04 RX ADMIN — ATORVASTATIN CALCIUM 40 MG: 40 TABLET, FILM COATED ORAL at 21:54

## 2024-09-04 SDOH — SOCIAL STABILITY: SOCIAL INSECURITY: ABUSE: ADULT

## 2024-09-04 SDOH — SOCIAL STABILITY: SOCIAL INSECURITY: DO YOU FEEL UNSAFE GOING BACK TO THE PLACE WHERE YOU ARE LIVING?: NO

## 2024-09-04 SDOH — SOCIAL STABILITY: SOCIAL INSECURITY: DO YOU FEEL ANYONE HAS EXPLOITED OR TAKEN ADVANTAGE OF YOU FINANCIALLY OR OF YOUR PERSONAL PROPERTY?: NO

## 2024-09-04 SDOH — SOCIAL STABILITY: SOCIAL INSECURITY: ARE YOU OR HAVE YOU BEEN THREATENED OR ABUSED PHYSICALLY, EMOTIONALLY, OR SEXUALLY BY ANYONE?: NO

## 2024-09-04 SDOH — SOCIAL STABILITY: SOCIAL INSECURITY: WERE YOU ABLE TO COMPLETE ALL THE BEHAVIORAL HEALTH SCREENINGS?: YES

## 2024-09-04 SDOH — SOCIAL STABILITY: SOCIAL INSECURITY: DOES ANYONE TRY TO KEEP YOU FROM HAVING/CONTACTING OTHER FRIENDS OR DOING THINGS OUTSIDE YOUR HOME?: NO

## 2024-09-04 SDOH — SOCIAL STABILITY: SOCIAL INSECURITY: HAVE YOU HAD THOUGHTS OF HARMING ANYONE ELSE?: NO

## 2024-09-04 SDOH — SOCIAL STABILITY: SOCIAL INSECURITY: ARE THERE ANY APPARENT SIGNS OF INJURIES/BEHAVIORS THAT COULD BE RELATED TO ABUSE/NEGLECT?: NO

## 2024-09-04 SDOH — SOCIAL STABILITY: SOCIAL INSECURITY: HAVE YOU HAD ANY THOUGHTS OF HARMING ANYONE ELSE?: NO

## 2024-09-04 SDOH — SOCIAL STABILITY: SOCIAL INSECURITY: HAS ANYONE EVER THREATENED TO HURT YOUR FAMILY OR YOUR PETS?: NO

## 2024-09-04 ASSESSMENT — COGNITIVE AND FUNCTIONAL STATUS - GENERAL
PATIENT BASELINE BEDBOUND: NO
DRESSING REGULAR UPPER BODY CLOTHING: A LITTLE
MOBILITY SCORE: 24
STANDING UP FROM CHAIR USING ARMS: A LITTLE
MOVING TO AND FROM BED TO CHAIR: A LITTLE
TOILETING: A LITTLE
MOBILITY SCORE: 19
DAILY ACTIVITIY SCORE: 20
HELP NEEDED FOR BATHING: A LITTLE
CLIMB 3 TO 5 STEPS WITH RAILING: A LOT
DRESSING REGULAR LOWER BODY CLOTHING: A LITTLE
DAILY ACTIVITIY SCORE: 24
WALKING IN HOSPITAL ROOM: A LITTLE

## 2024-09-04 ASSESSMENT — LIFESTYLE VARIABLES
AUDIT TOTAL SCORE: 0
HAS A RELATIVE, FRIEND, DOCTOR, OR ANOTHER HEALTH PROFESSIONAL EXPRESSED CONCERN ABOUT YOUR DRINKING OR SUGGESTED YOU CUT DOWN: NO
HOW OFTEN DURING THE LAST YEAR HAVE YOU HAD A FEELING OF GUILT OR REMORSE AFTER DRINKING: NEVER
PRESCIPTION_ABUSE_PAST_12_MONTHS: YES
HOW OFTEN DURING THE LAST YEAR HAVE YOU BEEN UNABLE TO REMEMBER WHAT HAPPENED THE NIGHT BEFORE BECAUSE YOU HAD BEEN DRINKING: NEVER
HOW MANY STANDARD DRINKS CONTAINING ALCOHOL DO YOU HAVE ON A TYPICAL DAY: 1 OR 2
HOW OFTEN DURING THE LAST YEAR HAVE YOU NEEDED AN ALCOHOLIC DRINK FIRST THING IN THE MORNING TO GET YOURSELF GOING AFTER A NIGHT OF HEAVY DRINKING: NEVER
HOW OFTEN DO YOU HAVE A DRINK CONTAINING ALCOHOL: 2-4 TIMES A MONTH
AUDIT-C TOTAL SCORE: 2
SUBSTANCE_ABUSE_PAST_12_MONTHS: NO
AUDIT-C TOTAL SCORE: 2
HAVE YOU OR SOMEONE ELSE BEEN INJURED AS A RESULT OF YOUR DRINKING: NO
HOW OFTEN DO YOU HAVE 6 OR MORE DRINKS ON ONE OCCASION: NEVER
HOW OFTEN DURING THE LAST YEAR HAVE YOU FOUND THAT YOU WERE NOT ABLE TO STOP DRINKING ONCE YOU HAD STARTED: NEVER
HOW OFTEN DURING THE LAST YEAR HAVE YOU FAILED TO DO WHAT WAS NORMALLY EXPECTED FROM YOU BECAUSE OF DRINKING: NEVER
SKIP TO QUESTIONS 9-10: 1
AUDIT TOTAL SCORE: 2

## 2024-09-04 ASSESSMENT — ENCOUNTER SYMPTOMS
CARDIOVASCULAR NEGATIVE: 1
RESPIRATORY NEGATIVE: 1
NEUROLOGICAL NEGATIVE: 1
PSYCHIATRIC NEGATIVE: 1
MUSCULOSKELETAL NEGATIVE: 1
HEMATOLOGIC/LYMPHATIC NEGATIVE: 1

## 2024-09-04 ASSESSMENT — ACTIVITIES OF DAILY LIVING (ADL)
BATHING: INDEPENDENT
GROOMING: INDEPENDENT
ADEQUATE_TO_COMPLETE_ADL: YES
DRESSING YOURSELF: INDEPENDENT
JUDGMENT_ADEQUATE_SAFELY_COMPLETE_DAILY_ACTIVITIES: YES
WALKS IN HOME: INDEPENDENT
HEARING - LEFT EAR: FUNCTIONAL
PATIENT'S MEMORY ADEQUATE TO SAFELY COMPLETE DAILY ACTIVITIES?: YES
TOILETING: INDEPENDENT
FEEDING YOURSELF: INDEPENDENT
HEARING - RIGHT EAR: FUNCTIONAL
LACK_OF_TRANSPORTATION: NO

## 2024-09-04 ASSESSMENT — PATIENT HEALTH QUESTIONNAIRE - PHQ9
1. LITTLE INTEREST OR PLEASURE IN DOING THINGS: NOT AT ALL
2. FEELING DOWN, DEPRESSED OR HOPELESS: NOT AT ALL
SUM OF ALL RESPONSES TO PHQ9 QUESTIONS 1 & 2: 0

## 2024-09-04 ASSESSMENT — PAIN - FUNCTIONAL ASSESSMENT: PAIN_FUNCTIONAL_ASSESSMENT: 0-10

## 2024-09-04 ASSESSMENT — COLUMBIA-SUICIDE SEVERITY RATING SCALE - C-SSRS
6. HAVE YOU EVER DONE ANYTHING, STARTED TO DO ANYTHING, OR PREPARED TO DO ANYTHING TO END YOUR LIFE?: NO
2. HAVE YOU ACTUALLY HAD ANY THOUGHTS OF KILLING YOURSELF?: NO
1. IN THE PAST MONTH, HAVE YOU WISHED YOU WERE DEAD OR WISHED YOU COULD GO TO SLEEP AND NOT WAKE UP?: NO

## 2024-09-04 ASSESSMENT — PAIN SCALES - GENERAL
PAINLEVEL_OUTOF10: 0 - NO PAIN
PAINLEVEL_OUTOF10: 0 - NO PAIN

## 2024-09-04 NOTE — ED TRIAGE NOTES
Patient states it started at 1900 yesterday. Feels like she has to have a bowel movement and then dark blood comes out. Was constipated prior for a week

## 2024-09-04 NOTE — CARE PLAN
The patient's goals for the shift include      The clinical goals for the shift include REMAIN HEMODYNAMICALLY STABLE    Over the shift, the patient made progress toward the following goals.   Problem: Fall/Injury  Goal: Not fall by end of shift  Outcome: Progressing  Goal: Be free from injury by end of the shift  Outcome: Progressing  Goal: Verbalize understanding of personal risk factors for fall in the hospital  Outcome: Progressing  Goal: Verbalize understanding of risk factor reduction measures to prevent injury from fall in the home  Outcome: Progressing  Goal: Use assistive devices by end of the shift  Outcome: Progressing  Goal: Pace activities to prevent fatigue by end of the shift  Outcome: Progressing

## 2024-09-04 NOTE — ED NOTES
Daughter Mary notified that patient had a fall and that patient is pending transfer to another facility. Daughter verbalizes understanding, all questions answered.      Melissa Gill RN  09/04/24 0005

## 2024-09-04 NOTE — H&P (VIEW-ONLY)
Reason For Consult  GI bleed    History Of Present Illness  Dianne Clay is a 84 y.o. female with h/o coronary artery disease with stent placed in 2006, hypothyroidism, paroxysmal atrial fibrillation, on Eliquis, HTN, pseudogout, glaucoma, h/o diverticular hemorrhage, s/p cholecystectomy, presenting as transfer from [facility] for rectal bleeding. Pt reported bleeding started 2 days ago, she passed multiple episodes of BRPR associated with abdominal pain, postural lightheadedness. She went to [facility] ED. CT OSH with contrast showed colonic diverticulosis with no findings of acute diverticulitis; examination is limited for evaluation of GI bleed with no noncontrast or arterial phase imaging performed; some mild increased density attenuation in the proximal to mid ascending colon the possibility of active GI bleed is considered. GI recommend transferred to the facility where there is angiography available. Pt stopped bleeding. Declined the transfer and left AMA. She stated the bleeding recurs and she came to [facility]. Her H&H stable. She had a syncopal episode and sustained laceration. CT of the head was negative for bleeding or any fractures. She had another episode of blood per rectum today around 2 pm.   She has h/o GI bleeding in the past, thought to be diverticular. She was recommended watchman procedure, but cancelled the appointent with cardiology.     EGD November 9, 2023 with no source for bleeding or anemia, gastric biopsies negative for H. pylori.     Last colonoscopy July 2021 showed moderate diverticulosis throughout the colon with multiple small to large size diverticuli.  Retroflexed views of the rectum showed small internal hemorrhoids.  The terminal ileum was intubated for several centimeters and appeared normal.    Past Medical History  She has a past medical history of Allergic contact dermatitis, unspecified cause (11/07/2015), Contusion of unspecified knee, initial encounter (07/03/2018),  Effusion, unspecified knee (2019), Encounter for follow-up examination after completed treatment for conditions other than malignant neoplasm (2022), Noninfective gastroenteritis and colitis, unspecified (2022), Noninfective gastroenteritis and colitis, unspecified, Other conditions influencing health status (2021), Other conditions influencing health status, Pain in left knee (2014), Personal history of other diseases of the musculoskeletal system and connective tissue, Personal history of other specified conditions (10/21/2016), Personal history of other specified conditions (2015), and Personal history of pneumonia (recurrent) (2022).    Surgical History  She has a past surgical history that includes Colonoscopy (2014);  section, classic (2014); Cataract extraction (2014); Coronary angioplasty with stent (2014); Other surgical history (2023); Upper gastrointestinal endoscopy; and Upper gastrointestinal endoscopy.     Social History  She reports that she has never smoked. She has never used smokeless tobacco. She reports that she does not currently use alcohol. She reports that she does not use drugs.    Family History  Family History   Problem Relation Name Age of Onset    Other (cardiac disorder) Mother      Other (cardiac disorder) Father      Other (cardiac disorder) Sister      Cancer Sister          Allergies  Bee venom protein (honey bee)    Review of Systems  10 systems reviewed and negative other than HPI     Physical Exam  Physical Exam  Constitutional:       Appearance: Normal appearance.   HENT:      Head: Normocephalic and atraumatic.      Nose: Nose normal.      Mouth/Throat:      Mouth: Mucous membranes are moist.      Pharynx: Oropharynx is clear.   Eyes:      Conjunctiva/sclera: Conjunctivae normal.   Cardiovascular:      Rate and Rhythm: Regular rhythm.      Heart sounds: Normal heart sounds.   Pulmonary:      Effort:  "Pulmonary effort is normal.      Breath sounds: Normal breath sounds.   Abdominal:      General: Bowel sounds are normal. There is no distension.      Palpations: Abdomen is soft.      Tenderness: There is no abdominal tenderness. There is no guarding.   Musculoskeletal:      Cervical back: Neck supple.   Skin:     General: Skin is warm and dry.   Neurological:      General: No focal deficit present.      Mental Status: She is alert and oriented to person, place, and time. Mental status is at baseline.   Psychiatric:         Mood and Affect: Mood normal.         Behavior: Behavior normal.            Last Recorded Vitals  Blood pressure 116/70, pulse 79, temperature 36.2 °C (97.1 °F), resp. rate 18, height 1.65 m (5' 4.96\"), weight 52.2 kg (115 lb), SpO2 99%.    Relevant Results      Scheduled medications  [START ON 9/5/2024] amiodarone, 200 mg, oral, Daily  [Held by provider] apixaban, 2.5 mg, oral, BID  [Held by provider] aspirin, 81 mg, oral, Daily  [START ON 9/5/2024] atenolol, 50 mg, oral, Daily  atorvastatin, 40 mg, oral, Daily  [START ON 9/5/2024] ferrous sulfate (325 mg ferrous sulfate), 1 tablet, oral, Daily  [START ON 9/5/2024] methIMAzole, 5 mg, oral, Daily  pantoprazole, 40 mg, intravenous, BID      Continuous medications     PRN medications  PRN medications: acetaminophen **OR** acetaminophen **OR** acetaminophen  CT head W O contrast trauma protocol    Result Date: 9/3/2024  Interpreted By:  Demond Bauer, STUDY: CT HEAD W/O CONTRAST TRAUMA PROTOCOL; CT CERVICAL SPINE WO IV CONTRAST;  9/3/2024 11:02 pm   INDICATION: Signs/Symptoms:fall on thinners; Signs/Symptoms:fall     COMPARISON: None.   ACCESSION NUMBER(S): [id number]; [id number]   ORDERING CLINICIAN: CONSTANZA ARMSTRONG   TECHNIQUE: Axial noncontrast CT images of head with coronal and sagittal reconstructed images. Axial noncontrast CT images of the cervical spine with coronal and sagittal reconstructed images.   FINDINGS: CT HEAD:   BRAIN PARENCHYMA: " Gray-white differentiation is preserved. No mass-effect, midline shift or effacement of cerebral sulci. Mild periventricular and subcortical white matter hypodensities, nonspecific but often seen in the setting of chronic microangiopathic change.   HEMORRHAGE: No acute intracranial hemorrhage.   VENTRICLES and EXTRA-AXIAL SPACES: The ventricles and sulci are within normal limits for brain volume. No abnormal extra-axial fluid collection.   ORBITS: The visualized orbits and globes are within normal limits.   EXTRACRANIAL SOFT TISSUES: Mild right supraorbital soft tissue swelling.   PARANASAL SINUSES/MASTOIDS: The visualized paranasal sinuses and mastoid air cells are well aerated.   CALVARIUM: No depressed skull fracture.         CT CERVICAL SPINE:   CRANIOCERVICAL JUNCTION: Intact.   ALIGNMENT: No traumatic malalignment or traumatic facet widening.   VERTEBRAE/DISC SPACES: No acute fracture. Vertebral body heights are maintained. Mild-to-moderate disc space height loss with associated degenerative endplate changes and mild bilateral facet arthrosis. No high grade spinal canal stenosis evident by CT.   SOFT TISSUES: Enlarged multinodular thyroid gland. This may be further assessed with outpatient ultrasound.   OTHER: Suggestion of tree-in-bud opacities within the visualized lung apices, possibly reflecting an infectious or inflammatory process.       CT HEAD: 1. No acute intracranial abnormality or calvarial fracture. 2. Mild right supraorbital soft tissue swelling.     CT CERVICAL SPINE: 1. No acute fracture or traumatic malalignment of the cervical spine. 2. Enlarged multinodular thyroid gland, this may be further assessed with outpatient ultrasound. 3. Suggestion of tree-in-bud opacities in the lung apices which may reflect an infectious or inflammatory process.   MACRO: None   Signed by: Demond Bauer 9/3/2024 11:23 PM Dictation workstation:   [id number]    CT cervical spine wo IV contrast    Result Date:  9/3/2024  Interpreted By:  Demond Bauer, STUDY: CT HEAD W/O CONTRAST TRAUMA PROTOCOL; CT CERVICAL SPINE WO IV CONTRAST;  9/3/2024 11:02 pm   INDICATION: Signs/Symptoms:fall on thinners; Signs/Symptoms:fall     COMPARISON: None.   ACCESSION NUMBER(S): [id number]; [id number]   ORDERING CLINICIAN: CONSTANZA ARMSTRONG   TECHNIQUE: Axial noncontrast CT images of head with coronal and sagittal reconstructed images. Axial noncontrast CT images of the cervical spine with coronal and sagittal reconstructed images.   FINDINGS: CT HEAD:   BRAIN PARENCHYMA: Gray-white differentiation is preserved. No mass-effect, midline shift or effacement of cerebral sulci. Mild periventricular and subcortical white matter hypodensities, nonspecific but often seen in the setting of chronic microangiopathic change.   HEMORRHAGE: No acute intracranial hemorrhage.   VENTRICLES and EXTRA-AXIAL SPACES: The ventricles and sulci are within normal limits for brain volume. No abnormal extra-axial fluid collection.   ORBITS: The visualized orbits and globes are within normal limits.   EXTRACRANIAL SOFT TISSUES: Mild right supraorbital soft tissue swelling.   PARANASAL SINUSES/MASTOIDS: The visualized paranasal sinuses and mastoid air cells are well aerated.   CALVARIUM: No depressed skull fracture.         CT CERVICAL SPINE:   CRANIOCERVICAL JUNCTION: Intact.   ALIGNMENT: No traumatic malalignment or traumatic facet widening.   VERTEBRAE/DISC SPACES: No acute fracture. Vertebral body heights are maintained. Mild-to-moderate disc space height loss with associated degenerative endplate changes and mild bilateral facet arthrosis. No high grade spinal canal stenosis evident by CT.   SOFT TISSUES: Enlarged multinodular thyroid gland. This may be further assessed with outpatient ultrasound.   OTHER: Suggestion of tree-in-bud opacities within the visualized lung apices, possibly reflecting an infectious or inflammatory process.       CT HEAD: 1. No acute  intracranial abnormality or calvarial fracture. 2. Mild right supraorbital soft tissue swelling.     CT CERVICAL SPINE: 1. No acute fracture or traumatic malalignment of the cervical spine. 2. Enlarged multinodular thyroid gland, this may be further assessed with outpatient ultrasound. 3. Suggestion of tree-in-bud opacities in the lung apices which may reflect an infectious or inflammatory process.   MACRO: None   Signed by: Demond Bauer 9/3/2024 11:23 PM Dictation workstation:   [id number]    CT abdomen pelvis w IV contrast    Result Date: 9/3/2024  * * *Final Report* * * DATE OF EXAM: Sep  3 2024  3:27AM   ASC   0530  -  CT ABD/PEL W IVCON  / ACCESSION #  [id number] PROCEDURE REASON: GI bleed      * * * * Physician Interpretation * * * *  EXAMINATION:  CT ABDOMEN AND PELVIS WITH IV CONTRAST CLINICAL HISTORY: PATIENT/TECHNOLOGIST PROVIDED HISTORY:   rectal bleeding since 7pm yesterday- pt denies any abd pain- hx of rectal bleeding CLINICAL INFORMATION (AS PROVIDED BY ORDERING CLINICIAN) :  GI bleed TECHNIQUE: CT of the abdomen and pelvis was performed using standard technique, scanning from just above the dome of the diaphragm to the symphysis pubis.  Coronal and sagittal reconstructed images generated. MQ:  CTAP_3 Contrast: IV:  100 ml of Omnipaque 350 Oral:  None CT Radiation dose: Integrated Dose-length product (DLP) for this visit =   242 mGy*cm. CT Dose Reduction Employed: Automated exposure control (AEC) COMPARISON: 11/08/2023 RESULT: Liver: 1.4 cm hypodensity left lobe most likely represents a cyst.   Otherwise unremarkable. Biliary: Gallbladder is absent.  Mild intrahepatic biliary duct dilatation likely related to postcholecystectomy state. Spleen: No mass. No splenomegaly. Pancreas: No mass or duct dilation. Adrenals: Right adrenal gland unremarkable.  Stable 1.7 cm left adrenal gland nodule. Kidneys: Enhance symmetrically.  No hydronephrosis.  Subcentimeter hypodensities right kidney are too small to  accurately characterize. GI tract: No dilation or wall thickening. Appendix not visualized.   Colonic diverticulosis with no findings of acute diverticulitis.   Examination is limited for evaluation of GI bleed with no noncontrast or arterial phase imaging performed.  In light of this limitation there is   some mild increased density attenuation in the proximal to mid ascending colon (2:46-53, series 601 image 46-51 and series 602 imaged 25-31) and the possibility of active GI bleed is considered. Lymph nodes: No lymphadenopathy identified. Mesentery/Peritoneum: No free air.  No ascites. Retroperitoneum: No mass. Vasculature:  - Abdominal aorta and iliac arteries: Atherosclerotic calcifications without aneurysm.  - Celiac and SMA: Patent.  - Portal venous system (SMV, splenic vein, portal vein and branches): Patent.  - Hepatic veins: Patent. Pelvis: Urinary bladder is predominantly collapsed and otherwise unremarkable.  There are a couple calcified uterine fibroids. Bones: Osteopenia.  Degenerative changes. Lower thorax: Mild scarring right middle lobe and lingula.  No focal consolidation.  No pleural effusion. Localizer images: No additional findings.    IMPRESSION: Examination is limited for evaluation of GI bleed with no noncontrast or arterial phase imaging performed.  In light of this limitation there is some mild increased density attenuation in the proximal to mid ascending colon (2:46-53, series 601 image 46-51 and series 602 image 25-31) and the possibility of active GI bleed is considered. Diverticulosis with no findings of acute diverticulitis. No bowel obstruction or free air. Details above. Follow-up as indicated. CRITICAL TEST/RESULTS: Acuity: Critical Finding: Active (arterial) bleeding, e.g. GI, retroperitoneal, aortic rupture, leaking/ruptured aneurysm, etc. Communication:  Communicated with GURINDER CUENCA on  9/3/2024 5:01 AM   via verbal communication. --END OF FINDING-- : NORMAN    Transcribe Date/Time: Sep  3 2024  4:38A Dictated by : PANKAJ PALOMINO MD This examination was interpreted and the report reviewed and electronically signed by: PANKAJ PALOMINO MD on Sep  3 2024  5:06AM  EST   Results for orders placed or performed during the hospital encounter of 09/04/24 (from the past 24 hour(s))   CBC and Auto Differential   Result Value Ref Range    WBC 11.9 (H) 4.4 - 11.3 x10*3/uL    nRBC 0.0 0.0 - 0.0 /100 WBCs    RBC 3.25 (L) 4.00 - 5.20 x10*6/uL    Hemoglobin 9.5 (L) 12.0 - 16.0 g/dL    Hematocrit 29.3 (L) 36.0 - 46.0 %    MCV 90 80 - 100 fL    MCH 29.2 26.0 - 34.0 pg    MCHC 32.4 32.0 - 36.0 g/dL    RDW 17.5 (H) 11.5 - 14.5 %    Platelets 197 150 - 450 x10*3/uL    Neutrophils % 74.5 40.0 - 80.0 %    Immature Granulocytes %, Automated 0.4 0.0 - 0.9 %    Lymphocytes % 18.2 13.0 - 44.0 %    Monocytes % 6.0 2.0 - 10.0 %    Eosinophils % 0.5 0.0 - 6.0 %    Basophils % 0.4 0.0 - 2.0 %    Neutrophils Absolute 8.86 (H) 1.60 - 5.50 x10*3/uL    Immature Granulocytes Absolute, Automated 0.05 0.00 - 0.50 x10*3/uL    Lymphocytes Absolute 2.17 0.80 - 3.00 x10*3/uL    Monocytes Absolute 0.72 0.05 - 0.80 x10*3/uL    Eosinophils Absolute 0.06 0.00 - 0.40 x10*3/uL    Basophils Absolute 0.05 0.00 - 0.10 x10*3/uL   Comprehensive Metabolic Panel   Result Value Ref Range    Glucose 90 74 - 99 mg/dL    Sodium 140 136 - 145 mmol/L    Potassium 4.0 3.5 - 5.3 mmol/L    Chloride 108 (H) 98 - 107 mmol/L    Bicarbonate 19 (L) 21 - 32 mmol/L    Anion Gap 17 10 - 20 mmol/L    Urea Nitrogen 26 (H) 6 - 23 mg/dL    Creatinine 0.73 0.50 - 1.05 mg/dL    eGFR 82 >60 mL/min/1.73m*2    Calcium 7.5 (L) 8.6 - 10.3 mg/dL    Albumin 2.8 (L) 3.4 - 5.0 g/dL    Alkaline Phosphatase 73 33 - 136 U/L    Total Protein 7.2 6.4 - 8.2 g/dL    AST 12 9 - 39 U/L    Bilirubin, Total 1.1 0.0 - 1.2 mg/dL    ALT 5 (L) 7 - 45 U/L   Coagulation Screen   Result Value Ref Range    Protime 12.8 9.8 - 12.8 seconds    INR 1.1 0.9 - 1.1    aPTT 27 27 - 38  seconds          Assessment/Plan     85 yo female presented with acute GI bleeding, likely lower bleeding in the setting of diverticulosis and anticoagulation, with possible active bleeding in the proximal to mid ascending colon. H&H stable    -ok for clear liquid diet  -monitor H&H and transfuse as needed to keep Hgb>7  -hold eliquis  -consider IR consultation for possible embolization      I spent 45 minutes in the professional and overall care of this patient.      Florinda Navarro, JEREMY-CNP     alert

## 2024-09-04 NOTE — CONSULTS
Reason For Consult  GI bleed    History Of Present Illness  Dianne Clay is a 84 y.o. female with h/o coronary artery disease with stent placed in 2006, hypothyroidism, paroxysmal atrial fibrillation, on Eliquis, HTN, pseudogout, glaucoma, h/o diverticular hemorrhage, s/p cholecystectomy, presenting as transfer from [facility] for rectal bleeding. Pt reported bleeding started 2 days ago, she passed multiple episodes of BRPR associated with abdominal pain, postural lightheadedness. She went to [facility] ED. CT OSH with contrast showed colonic diverticulosis with no findings of acute diverticulitis; examination is limited for evaluation of GI bleed with no noncontrast or arterial phase imaging performed; some mild increased density attenuation in the proximal to mid ascending colon the possibility of active GI bleed is considered. GI recommend transferred to the facility where there is angiography available. Pt stopped bleeding. Declined the transfer and left AMA. She stated the bleeding recurs and she came to [facility]. Her H&H stable. She had a syncopal episode and sustained laceration. CT of the head was negative for bleeding or any fractures. She had another episode of blood per rectum today around 2 pm.   She has h/o GI bleeding in the past, thought to be diverticular. She was recommended watchman procedure, but cancelled the appointent with cardiology.     EGD November 9, 2023 with no source for bleeding or anemia, gastric biopsies negative for H. pylori.     Last colonoscopy July 2021 showed moderate diverticulosis throughout the colon with multiple small to large size diverticuli.  Retroflexed views of the rectum showed small internal hemorrhoids.  The terminal ileum was intubated for several centimeters and appeared normal.    Past Medical History  She has a past medical history of Allergic contact dermatitis, unspecified cause (11/07/2015), Contusion of unspecified knee, initial encounter (07/03/2018),  Effusion, unspecified knee (2019), Encounter for follow-up examination after completed treatment for conditions other than malignant neoplasm (2022), Noninfective gastroenteritis and colitis, unspecified (2022), Noninfective gastroenteritis and colitis, unspecified, Other conditions influencing health status (2021), Other conditions influencing health status, Pain in left knee (2014), Personal history of other diseases of the musculoskeletal system and connective tissue, Personal history of other specified conditions (10/21/2016), Personal history of other specified conditions (2015), and Personal history of pneumonia (recurrent) (2022).    Surgical History  She has a past surgical history that includes Colonoscopy (2014);  section, classic (2014); Cataract extraction (2014); Coronary angioplasty with stent (2014); Other surgical history (2023); Upper gastrointestinal endoscopy; and Upper gastrointestinal endoscopy.     Social History  She reports that she has never smoked. She has never used smokeless tobacco. She reports that she does not currently use alcohol. She reports that she does not use drugs.    Family History  Family History   Problem Relation Name Age of Onset    Other (cardiac disorder) Mother      Other (cardiac disorder) Father      Other (cardiac disorder) Sister      Cancer Sister          Allergies  Bee venom protein (honey bee)    Review of Systems  10 systems reviewed and negative other than HPI     Physical Exam  Physical Exam  Constitutional:       Appearance: Normal appearance.   HENT:      Head: Normocephalic and atraumatic.      Nose: Nose normal.      Mouth/Throat:      Mouth: Mucous membranes are moist.      Pharynx: Oropharynx is clear.   Eyes:      Conjunctiva/sclera: Conjunctivae normal.   Cardiovascular:      Rate and Rhythm: Regular rhythm.      Heart sounds: Normal heart sounds.   Pulmonary:      Effort:  "Pulmonary effort is normal.      Breath sounds: Normal breath sounds.   Abdominal:      General: Bowel sounds are normal. There is no distension.      Palpations: Abdomen is soft.      Tenderness: There is no abdominal tenderness. There is no guarding.   Musculoskeletal:      Cervical back: Neck supple.   Skin:     General: Skin is warm and dry.   Neurological:      General: No focal deficit present.      Mental Status: She is alert and oriented to person, place, and time. Mental status is at baseline.   Psychiatric:         Mood and Affect: Mood normal.         Behavior: Behavior normal.            Last Recorded Vitals  Blood pressure 116/70, pulse 79, temperature 36.2 °C (97.1 °F), resp. rate 18, height 1.65 m (5' 4.96\"), weight 52.2 kg (115 lb), SpO2 99%.    Relevant Results      Scheduled medications  [START ON 9/5/2024] amiodarone, 200 mg, oral, Daily  [Held by provider] apixaban, 2.5 mg, oral, BID  [Held by provider] aspirin, 81 mg, oral, Daily  [START ON 9/5/2024] atenolol, 50 mg, oral, Daily  atorvastatin, 40 mg, oral, Daily  [START ON 9/5/2024] ferrous sulfate (325 mg ferrous sulfate), 1 tablet, oral, Daily  [START ON 9/5/2024] methIMAzole, 5 mg, oral, Daily  pantoprazole, 40 mg, intravenous, BID      Continuous medications     PRN medications  PRN medications: acetaminophen **OR** acetaminophen **OR** acetaminophen  CT head W O contrast trauma protocol    Result Date: 9/3/2024  Interpreted By:  Demond Bauer, STUDY: CT HEAD W/O CONTRAST TRAUMA PROTOCOL; CT CERVICAL SPINE WO IV CONTRAST;  9/3/2024 11:02 pm   INDICATION: Signs/Symptoms:fall on thinners; Signs/Symptoms:fall     COMPARISON: None.   ACCESSION NUMBER(S): [id number]; [id number]   ORDERING CLINICIAN: CONSTANZA ARMSTRONG   TECHNIQUE: Axial noncontrast CT images of head with coronal and sagittal reconstructed images. Axial noncontrast CT images of the cervical spine with coronal and sagittal reconstructed images.   FINDINGS: CT HEAD:   BRAIN PARENCHYMA: " Gray-white differentiation is preserved. No mass-effect, midline shift or effacement of cerebral sulci. Mild periventricular and subcortical white matter hypodensities, nonspecific but often seen in the setting of chronic microangiopathic change.   HEMORRHAGE: No acute intracranial hemorrhage.   VENTRICLES and EXTRA-AXIAL SPACES: The ventricles and sulci are within normal limits for brain volume. No abnormal extra-axial fluid collection.   ORBITS: The visualized orbits and globes are within normal limits.   EXTRACRANIAL SOFT TISSUES: Mild right supraorbital soft tissue swelling.   PARANASAL SINUSES/MASTOIDS: The visualized paranasal sinuses and mastoid air cells are well aerated.   CALVARIUM: No depressed skull fracture.         CT CERVICAL SPINE:   CRANIOCERVICAL JUNCTION: Intact.   ALIGNMENT: No traumatic malalignment or traumatic facet widening.   VERTEBRAE/DISC SPACES: No acute fracture. Vertebral body heights are maintained. Mild-to-moderate disc space height loss with associated degenerative endplate changes and mild bilateral facet arthrosis. No high grade spinal canal stenosis evident by CT.   SOFT TISSUES: Enlarged multinodular thyroid gland. This may be further assessed with outpatient ultrasound.   OTHER: Suggestion of tree-in-bud opacities within the visualized lung apices, possibly reflecting an infectious or inflammatory process.       CT HEAD: 1. No acute intracranial abnormality or calvarial fracture. 2. Mild right supraorbital soft tissue swelling.     CT CERVICAL SPINE: 1. No acute fracture or traumatic malalignment of the cervical spine. 2. Enlarged multinodular thyroid gland, this may be further assessed with outpatient ultrasound. 3. Suggestion of tree-in-bud opacities in the lung apices which may reflect an infectious or inflammatory process.   MACRO: None   Signed by: Demond Bauer 9/3/2024 11:23 PM Dictation workstation:   [id number]    CT cervical spine wo IV contrast    Result Date:  9/3/2024  Interpreted By:  Demond Bauer, STUDY: CT HEAD W/O CONTRAST TRAUMA PROTOCOL; CT CERVICAL SPINE WO IV CONTRAST;  9/3/2024 11:02 pm   INDICATION: Signs/Symptoms:fall on thinners; Signs/Symptoms:fall     COMPARISON: None.   ACCESSION NUMBER(S): [id number]; [id number]   ORDERING CLINICIAN: CONSTANZA ARMSTRONG   TECHNIQUE: Axial noncontrast CT images of head with coronal and sagittal reconstructed images. Axial noncontrast CT images of the cervical spine with coronal and sagittal reconstructed images.   FINDINGS: CT HEAD:   BRAIN PARENCHYMA: Gray-white differentiation is preserved. No mass-effect, midline shift or effacement of cerebral sulci. Mild periventricular and subcortical white matter hypodensities, nonspecific but often seen in the setting of chronic microangiopathic change.   HEMORRHAGE: No acute intracranial hemorrhage.   VENTRICLES and EXTRA-AXIAL SPACES: The ventricles and sulci are within normal limits for brain volume. No abnormal extra-axial fluid collection.   ORBITS: The visualized orbits and globes are within normal limits.   EXTRACRANIAL SOFT TISSUES: Mild right supraorbital soft tissue swelling.   PARANASAL SINUSES/MASTOIDS: The visualized paranasal sinuses and mastoid air cells are well aerated.   CALVARIUM: No depressed skull fracture.         CT CERVICAL SPINE:   CRANIOCERVICAL JUNCTION: Intact.   ALIGNMENT: No traumatic malalignment or traumatic facet widening.   VERTEBRAE/DISC SPACES: No acute fracture. Vertebral body heights are maintained. Mild-to-moderate disc space height loss with associated degenerative endplate changes and mild bilateral facet arthrosis. No high grade spinal canal stenosis evident by CT.   SOFT TISSUES: Enlarged multinodular thyroid gland. This may be further assessed with outpatient ultrasound.   OTHER: Suggestion of tree-in-bud opacities within the visualized lung apices, possibly reflecting an infectious or inflammatory process.       CT HEAD: 1. No acute  intracranial abnormality or calvarial fracture. 2. Mild right supraorbital soft tissue swelling.     CT CERVICAL SPINE: 1. No acute fracture or traumatic malalignment of the cervical spine. 2. Enlarged multinodular thyroid gland, this may be further assessed with outpatient ultrasound. 3. Suggestion of tree-in-bud opacities in the lung apices which may reflect an infectious or inflammatory process.   MACRO: None   Signed by: Demond Bauer 9/3/2024 11:23 PM Dictation workstation:   [id number]    CT abdomen pelvis w IV contrast    Result Date: 9/3/2024  * * *Final Report* * * DATE OF EXAM: Sep  3 2024  3:27AM   ASC   0530  -  CT ABD/PEL W IVCON  / ACCESSION #  [id number] PROCEDURE REASON: GI bleed      * * * * Physician Interpretation * * * *  EXAMINATION:  CT ABDOMEN AND PELVIS WITH IV CONTRAST CLINICAL HISTORY: PATIENT/TECHNOLOGIST PROVIDED HISTORY:   rectal bleeding since 7pm yesterday- pt denies any abd pain- hx of rectal bleeding CLINICAL INFORMATION (AS PROVIDED BY ORDERING CLINICIAN) :  GI bleed TECHNIQUE: CT of the abdomen and pelvis was performed using standard technique, scanning from just above the dome of the diaphragm to the symphysis pubis.  Coronal and sagittal reconstructed images generated. MQ:  CTAP_3 Contrast: IV:  100 ml of Omnipaque 350 Oral:  None CT Radiation dose: Integrated Dose-length product (DLP) for this visit =   242 mGy*cm. CT Dose Reduction Employed: Automated exposure control (AEC) COMPARISON: 11/08/2023 RESULT: Liver: 1.4 cm hypodensity left lobe most likely represents a cyst.   Otherwise unremarkable. Biliary: Gallbladder is absent.  Mild intrahepatic biliary duct dilatation likely related to postcholecystectomy state. Spleen: No mass. No splenomegaly. Pancreas: No mass or duct dilation. Adrenals: Right adrenal gland unremarkable.  Stable 1.7 cm left adrenal gland nodule. Kidneys: Enhance symmetrically.  No hydronephrosis.  Subcentimeter hypodensities right kidney are too small to  accurately characterize. GI tract: No dilation or wall thickening. Appendix not visualized.   Colonic diverticulosis with no findings of acute diverticulitis.   Examination is limited for evaluation of GI bleed with no noncontrast or arterial phase imaging performed.  In light of this limitation there is   some mild increased density attenuation in the proximal to mid ascending colon (2:46-53, series 601 image 46-51 and series 602 imaged 25-31) and the possibility of active GI bleed is considered. Lymph nodes: No lymphadenopathy identified. Mesentery/Peritoneum: No free air.  No ascites. Retroperitoneum: No mass. Vasculature:  - Abdominal aorta and iliac arteries: Atherosclerotic calcifications without aneurysm.  - Celiac and SMA: Patent.  - Portal venous system (SMV, splenic vein, portal vein and branches): Patent.  - Hepatic veins: Patent. Pelvis: Urinary bladder is predominantly collapsed and otherwise unremarkable.  There are a couple calcified uterine fibroids. Bones: Osteopenia.  Degenerative changes. Lower thorax: Mild scarring right middle lobe and lingula.  No focal consolidation.  No pleural effusion. Localizer images: No additional findings.    IMPRESSION: Examination is limited for evaluation of GI bleed with no noncontrast or arterial phase imaging performed.  In light of this limitation there is some mild increased density attenuation in the proximal to mid ascending colon (2:46-53, series 601 image 46-51 and series 602 image 25-31) and the possibility of active GI bleed is considered. Diverticulosis with no findings of acute diverticulitis. No bowel obstruction or free air. Details above. Follow-up as indicated. CRITICAL TEST/RESULTS: Acuity: Critical Finding: Active (arterial) bleeding, e.g. GI, retroperitoneal, aortic rupture, leaking/ruptured aneurysm, etc. Communication:  Communicated with GURINDER CUENCA on  9/3/2024 5:01 AM   via verbal communication. --END OF FINDING-- : NORMAN    Transcribe Date/Time: Sep  3 2024  4:38A Dictated by : PANKAJ PALOMINO MD This examination was interpreted and the report reviewed and electronically signed by: PANKAJ PALOMINO MD on Sep  3 2024  5:06AM  EST   Results for orders placed or performed during the hospital encounter of 09/04/24 (from the past 24 hour(s))   CBC and Auto Differential   Result Value Ref Range    WBC 11.9 (H) 4.4 - 11.3 x10*3/uL    nRBC 0.0 0.0 - 0.0 /100 WBCs    RBC 3.25 (L) 4.00 - 5.20 x10*6/uL    Hemoglobin 9.5 (L) 12.0 - 16.0 g/dL    Hematocrit 29.3 (L) 36.0 - 46.0 %    MCV 90 80 - 100 fL    MCH 29.2 26.0 - 34.0 pg    MCHC 32.4 32.0 - 36.0 g/dL    RDW 17.5 (H) 11.5 - 14.5 %    Platelets 197 150 - 450 x10*3/uL    Neutrophils % 74.5 40.0 - 80.0 %    Immature Granulocytes %, Automated 0.4 0.0 - 0.9 %    Lymphocytes % 18.2 13.0 - 44.0 %    Monocytes % 6.0 2.0 - 10.0 %    Eosinophils % 0.5 0.0 - 6.0 %    Basophils % 0.4 0.0 - 2.0 %    Neutrophils Absolute 8.86 (H) 1.60 - 5.50 x10*3/uL    Immature Granulocytes Absolute, Automated 0.05 0.00 - 0.50 x10*3/uL    Lymphocytes Absolute 2.17 0.80 - 3.00 x10*3/uL    Monocytes Absolute 0.72 0.05 - 0.80 x10*3/uL    Eosinophils Absolute 0.06 0.00 - 0.40 x10*3/uL    Basophils Absolute 0.05 0.00 - 0.10 x10*3/uL   Comprehensive Metabolic Panel   Result Value Ref Range    Glucose 90 74 - 99 mg/dL    Sodium 140 136 - 145 mmol/L    Potassium 4.0 3.5 - 5.3 mmol/L    Chloride 108 (H) 98 - 107 mmol/L    Bicarbonate 19 (L) 21 - 32 mmol/L    Anion Gap 17 10 - 20 mmol/L    Urea Nitrogen 26 (H) 6 - 23 mg/dL    Creatinine 0.73 0.50 - 1.05 mg/dL    eGFR 82 >60 mL/min/1.73m*2    Calcium 7.5 (L) 8.6 - 10.3 mg/dL    Albumin 2.8 (L) 3.4 - 5.0 g/dL    Alkaline Phosphatase 73 33 - 136 U/L    Total Protein 7.2 6.4 - 8.2 g/dL    AST 12 9 - 39 U/L    Bilirubin, Total 1.1 0.0 - 1.2 mg/dL    ALT 5 (L) 7 - 45 U/L   Coagulation Screen   Result Value Ref Range    Protime 12.8 9.8 - 12.8 seconds    INR 1.1 0.9 - 1.1    aPTT 27 27 - 38  seconds          Assessment/Plan     83 yo female presented with acute GI bleeding, likely lower bleeding in the setting of diverticulosis and anticoagulation, with possible active bleeding in the proximal to mid ascending colon. H&H stable    -ok for clear liquid diet  -monitor H&H and transfuse as needed to keep Hgb>7  -hold eliquis  -consider IR consultation for possible embolization      I spent 45 minutes in the professional and overall care of this patient.      Florinda Navarro, JEREMY-CNP

## 2024-09-04 NOTE — ED PROVIDER NOTES
HPI   Chief Complaint   Patient presents with    Rectal Bleeding     Patient states it started at 1900 yesterday. Feels like she has to have a bowel movement and then dark blood comes out. Was constipated prior for a week        HPI  Patient is an 84-year-old female brought to the ED today via EMS for GI bleed with blood per rectum.  Patient explains that she started having blood per rectum about 27 hours ago.  She is anticoagulated on Eliquis.  She notes that she has had about 15 episodes of blood per rectum throughout this time, which she describes as dark red blood every time she feels like she is trying to have a bowel movement.  She was seen at [facility] for the symptoms early last night/this morning.  She had been accepted for transfer to [facility], but left AMA as she did not want to go to [facility].  She comes to the ED today due to continued symptoms.  She otherwise denies any abdominal pain, vomiting, lightheadedness, dizziness, or syncopal episodes.  She denies any chest pain or shortness of breath.  She has no additional concerns      Patient History   Past Medical History:   Diagnosis Date    Allergic contact dermatitis, unspecified cause 11/07/2015    Allergic dermatitis    Contusion of unspecified knee, initial encounter 07/03/2018    Knee contusion    Effusion, unspecified knee 03/06/2019    Knee swelling    Encounter for follow-up examination after completed treatment for conditions other than malignant neoplasm 04/01/2022    Hospital discharge follow-up    Noninfective gastroenteritis and colitis, unspecified 04/01/2022    Enteritis    Noninfective gastroenteritis and colitis, unspecified     Colitis    Other conditions influencing health status 09/21/2021    History of cough    Other conditions influencing health status     Ulcer    Pain in left knee 08/11/2014    Knee pain, left anterior    Personal history of other diseases of the musculoskeletal system and connective tissue     Personal history  of arthritis    Personal history of other specified conditions 10/21/2016    History of epistaxis    Personal history of other specified conditions 2015    History of dizziness    Personal history of pneumonia (recurrent) 2022    History of community acquired pneumonia     Past Surgical History:   Procedure Laterality Date    CATARACT EXTRACTION  2014    Cataract Surgery     SECTION, CLASSIC  2014     Section    COLONOSCOPY  2014    Colonoscopy (Fiberoptic)    CORONARY ANGIOPLASTY WITH STENT PLACEMENT  2014    Cath Stent Placement    OTHER SURGICAL HISTORY  2023    Cholecystectomy laparoscopic    UPPER GASTROINTESTINAL ENDOSCOPY      UPPER GASTROINTESTINAL ENDOSCOPY       Family History   Problem Relation Name Age of Onset    Other (cardiac disorder) Mother      Other (cardiac disorder) Father      Other (cardiac disorder) Sister      Cancer Sister       Social History     Tobacco Use    Smoking status: Never    Smokeless tobacco: Never   Vaping Use    Vaping status: Never Used   Substance Use Topics    Alcohol use: Not Currently     Comment: RARE    Drug use: Never       Physical Exam   ED Triage Vitals [24 2156]   Temperature Heart Rate Respirations BP   36.4 °C (97.6 °F) 70 16 112/55      Pulse Ox Temp Source Heart Rate Source Patient Position   100 % Oral Monitor Sitting      BP Location FiO2 (%)     Right arm --       Physical Exam  Vitals and nursing note reviewed.   Constitutional:       General: She is not in acute distress.     Appearance: She is not toxic-appearing.   HENT:      Head: Normocephalic.      Mouth/Throat:      Mouth: Mucous membranes are moist.   Eyes:      Extraocular Movements: Extraocular movements intact.      Conjunctiva/sclera: Conjunctivae normal.   Cardiovascular:      Rate and Rhythm: Normal rate and regular rhythm.      Pulses: Normal pulses.   Pulmonary:      Effort: Pulmonary effort is normal. No respiratory distress.       Breath sounds: Normal breath sounds. No wheezing.   Abdominal:      General: There is no distension.      Palpations: Abdomen is soft.      Tenderness: There is no abdominal tenderness.   Genitourinary:     Comments: Refused  Musculoskeletal:         General: No swelling.      Cervical back: Neck supple.   Skin:     General: Skin is warm and dry.      Capillary Refill: Capillary refill takes less than 2 seconds.   Neurological:      General: No focal deficit present.      Mental Status: She is alert. Mental status is at baseline.           ED Course & MDM   Diagnoses as of 09/04/24 0244   Lower GI bleed   Anticoagulated   Facial laceration, initial encounter   Fall, initial encounter                 No data recorded     Esvin Coma Scale Score: 15 (09/04/24 0005 : Melissa Gill RN)                       Medical Decision Making  Patient was seen and evaluated for lower GI bleed.  On arrival, vital signs are unremarkable.  Patient is placed on a care monitor with continuous pulse ox.  Peripheral IV is established.  I did review patient's workup from [facility] from earlier this morning.  CT imaging from approximately 5 AM this morning shows mild increased density attenuation in the proximal to mid descending colon consistent with active GI bleed.  Patient did receive 1 unit PRBC at [facility] this morning, and hemoglobin at approximately 6 AM this morning was 8.6.    Repeat lab work is ordered at this time.  As patient did have recent imaging done, repeat imaging is not currently indicated.    Repeat lab work here shows hemoglobin of 8.4, not significantly changed from this morning.    While here in the ED, patient attempted to get up on her own to use the bedside commode.  She then fell and hit her head, causing a small laceration to her right forehead just above the right eyebrow.  I presented immediately to the bedside once I was notified of the fall.  Patient had no focal neurological deficits.  However, with the  impact and anticoagulation on Eliquis, CT imaging is ordered for evaluation of acute traumatic injuries.    CT head W O contrast trauma protocol   Final Result   CT HEAD:   1. No acute intracranial abnormality or calvarial fracture.   2. Mild right supraorbital soft tissue swelling.             CT CERVICAL SPINE:   1. No acute fracture or traumatic malalignment of the cervical spine.   2. Enlarged multinodular thyroid gland, this may be further assessed   with outpatient ultrasound.   3. Suggestion of tree-in-bud opacities in the lung apices which may   reflect an infectious or inflammatory process.        MACRO:   None        Signed by: Demond Bauer 9/3/2024 11:23 PM   Dictation workstation:   [id number]      CT cervical spine wo IV contrast   Final Result   CT HEAD:   1. No acute intracranial abnormality or calvarial fracture.   2. Mild right supraorbital soft tissue swelling.             CT CERVICAL SPINE:   1. No acute fracture or traumatic malalignment of the cervical spine.   2. Enlarged multinodular thyroid gland, this may be further assessed   with outpatient ultrasound.   3. Suggestion of tree-in-bud opacities in the lung apices which may   reflect an infectious or inflammatory process.        MACRO:   None        Signed by: Demond Bauer 9/3/2024 11:23 PM   Dictation workstation:   [id number]        Repeat blood pressure sitting up following patient's fall was significantly hypotensive with systolic blood pressure in the 70s.  1 unit PRBC is therefore ordered for transfusion.  Repeat blood pressure lying down is normal in the 110s.    I also repaired patient's forehead laceration at bedside as described below.    Patient was informed of their lab and imaging results, and all questions and concerns were answered. Transfer planning for further management was discussed at this time, to which the patient was agreeable.  I discussed the case with Dr. Ortiz, hospitalist at [facility], who accepts patient  for transfer.    Procedure  Laceration Repair    Performed by: Valerie LERNER MD  Authorized by: Valerie LERNER MD    Consent:     Consent obtained:  Verbal    Consent given by:  Patient    Risks, benefits, and alternatives were discussed: yes      Risks discussed:  Poor cosmetic result and poor wound healing    Alternatives discussed:  No treatment  Universal protocol:     Procedure explained and questions answered to patient or proxy's satisfaction: yes      Imaging studies available: yes      Patient identity confirmed:  Verbally with patient and arm band  Anesthesia:     Anesthesia method:  Local infiltration    Local anesthetic:  Lidocaine 1% WITH epi  Laceration details:     Location:  Face    Face location:  Forehead    Length (cm):  1.5  Pre-procedure details:     Preparation:  Patient was prepped and draped in usual sterile fashion  Exploration:     Limited defect created (wound extended): no      Hemostasis achieved with:  Direct pressure    Imaging outcome: foreign body not noted      Wound exploration: wound explored through full range of motion      Contaminated: no    Treatment:     Area cleansed with:  Saline    Amount of cleaning:  Standard    Irrigation solution:  Sterile saline    Visualized foreign bodies/material removed: no    Skin repair:     Repair method:  Sutures    Suture size:  5-0    Suture material:  Prolene    Suture technique:  Simple interrupted    Number of sutures:  3  Approximation:     Approximation:  Close  Repair type:     Repair type:  Simple  Post-procedure details:     Dressing:  Antibiotic ointment and adhesive bandage    Procedure completion:  Tolerated well, no immediate complications       Valerie LERNER MD  09/04/24 0259

## 2024-09-04 NOTE — H&P
History Of Present Illness  Dianne Clay is a 84 y.o. female with a past medical history dyslipidemia, hypertension, bleeding ulcer, A-fib on Eliquis, hyperthyroidism presenting due to bloody bowel movements, patient came as a transfer from [facility].  Patient originally presented to outside [facility] hospital yesterday and left AMA as she did not want to be transferred to [facility] and went to [facility] yesterday.  Patient with multiple dark bloody bowel movements that started around 7 PM, 2 days ago.  Imaging was completed prior to her leaving outside CT abdomen/pelvis with IV contrast that showed examination is limited for evaluation of GI bleed with no noncontrast or arterial phase imaging performed.  In light of this limitation there is some mild increased density attenuation in the proximal to mid ascending colon (2:46-53, series 601 image 46-51 and series 602 image 25-31) and the possibility of active GI bleed is considered.  Diverticulosis with no findings of acute diverticulitis. No bowel obstruction or free air.  Initial hemoglobin at [facility] outside hospital was 10.3, hemoglobin came down to 8.6 on repeat and 8.4 on arrival to [facility] ER.  Patient was transfused 1 unit of packed blood cells at OSH, hemoglobin 9.1 and hematocrit 27.6 this a.m.  Patient unfortunately suffered a fall while in the [facility] ED resulting in a right forehead laceration.  CT head and cervical spine was performed and showed no acute intracranial abnormality or calvarial fracture, mild right suborbital soft tissue swelling, no acute fracture or traumatic malalignment of the cervical spine.  Did show enlarged multinodular thyroid gland and suggestion of tree in bud opacities in the left lung apices which may reflect an infectious or inflammatory process.  Patient to be admitted for further workup.    Past Medical History  She has a past medical history of Allergic contact dermatitis, unspecified cause  (2015), Contusion of unspecified knee, initial encounter (2018), Effusion, unspecified knee (2019), Encounter for follow-up examination after completed treatment for conditions other than malignant neoplasm (2022), Noninfective gastroenteritis and colitis, unspecified (2022), Noninfective gastroenteritis and colitis, unspecified, Other conditions influencing health status (2021), Other conditions influencing health status, Pain in left knee (2014), Personal history of other diseases of the musculoskeletal system and connective tissue, Personal history of other specified conditions (10/21/2016), Personal history of other specified conditions (2015), and Personal history of pneumonia (recurrent) (2022).    Surgical History  She has a past surgical history that includes Colonoscopy (2014);  section, classic (2014); Cataract extraction (2014); Coronary angioplasty with stent (2014); Other surgical history (2023); Upper gastrointestinal endoscopy; and Upper gastrointestinal endoscopy.     Social History  She reports that she has never smoked. She has never used smokeless tobacco. She reports that she does not currently use alcohol. She reports that she does not use drugs.    Family History  Family History   Problem Relation Name Age of Onset    Other (cardiac disorder) Mother      Other (cardiac disorder) Father      Other (cardiac disorder) Sister      Cancer Sister          Allergies  Bee venom protein (honey bee)    Review of Systems   HENT: Negative.     Respiratory: Negative.     Cardiovascular: Negative.    Genitourinary: Negative.    Musculoskeletal: Negative.    Skin: Negative.    Neurological: Negative.    Hematological: Negative.    Psychiatric/Behavioral: Negative.          Physical Exam  HENT:      Head: Normocephalic.      Mouth/Throat:      Mouth: Mucous membranes are moist.   Cardiovascular:      Rate and Rhythm:  Normal rate and regular rhythm.   Pulmonary:      Effort: Pulmonary effort is normal.   Abdominal:      General: Bowel sounds are normal.      Palpations: Abdomen is soft.   Musculoskeletal:         General: Normal range of motion.      Cervical back: Neck supple.   Skin:     General: Skin is warm.   Neurological:      Mental Status: She is alert and oriented to person, place, and time.   Psychiatric:         Mood and Affect: Mood normal.         Behavior: Behavior normal.          Last Recorded Vitals  /70   Pulse 79   Temp 36.2 °C (97.1 °F)   Resp 18   SpO2 99%     Relevant Results        Results for orders placed or performed during the hospital encounter of 09/04/24 (from the past 24 hour(s))   CBC and Auto Differential   Result Value Ref Range    WBC 11.9 (H) 4.4 - 11.3 x10*3/uL    nRBC 0.0 0.0 - 0.0 /100 WBCs    RBC 3.25 (L) 4.00 - 5.20 x10*6/uL    Hemoglobin 9.5 (L) 12.0 - 16.0 g/dL    Hematocrit 29.3 (L) 36.0 - 46.0 %    MCV 90 80 - 100 fL    MCH 29.2 26.0 - 34.0 pg    MCHC 32.4 32.0 - 36.0 g/dL    RDW 17.5 (H) 11.5 - 14.5 %    Platelets 197 150 - 450 x10*3/uL    Neutrophils % 74.5 40.0 - 80.0 %    Immature Granulocytes %, Automated 0.4 0.0 - 0.9 %    Lymphocytes % 18.2 13.0 - 44.0 %    Monocytes % 6.0 2.0 - 10.0 %    Eosinophils % 0.5 0.0 - 6.0 %    Basophils % 0.4 0.0 - 2.0 %    Neutrophils Absolute 8.86 (H) 1.60 - 5.50 x10*3/uL    Immature Granulocytes Absolute, Automated 0.05 0.00 - 0.50 x10*3/uL    Lymphocytes Absolute 2.17 0.80 - 3.00 x10*3/uL    Monocytes Absolute 0.72 0.05 - 0.80 x10*3/uL    Eosinophils Absolute 0.06 0.00 - 0.40 x10*3/uL    Basophils Absolute 0.05 0.00 - 0.10 x10*3/uL   Comprehensive Metabolic Panel   Result Value Ref Range    Glucose 90 74 - 99 mg/dL    Sodium 140 136 - 145 mmol/L    Potassium 4.0 3.5 - 5.3 mmol/L    Chloride 108 (H) 98 - 107 mmol/L    Bicarbonate 19 (L) 21 - 32 mmol/L    Anion Gap 17 10 - 20 mmol/L    Urea Nitrogen 26 (H) 6 - 23 mg/dL    Creatinine 0.73  0.50 - 1.05 mg/dL    eGFR 82 >60 mL/min/1.73m*2    Calcium 7.5 (L) 8.6 - 10.3 mg/dL    Albumin 2.8 (L) 3.4 - 5.0 g/dL    Alkaline Phosphatase 73 33 - 136 U/L    Total Protein 7.2 6.4 - 8.2 g/dL    AST 12 9 - 39 U/L    Bilirubin, Total 1.1 0.0 - 1.2 mg/dL    ALT 5 (L) 7 - 45 U/L   Coagulation Screen   Result Value Ref Range    Protime 12.8 9.8 - 12.8 seconds    INR 1.1 0.9 - 1.1    aPTT 27 27 - 38 seconds          Assessment/Plan   Assessment & Plan  GI bleed    Plan:  GI consult  IV Protonix  Trend hemoglobin and hematocrit  Transfuse if hemoglobin decreases rapidly or is less than 7  Consult IR per GI recommendations  Patient received 1 unit of packed red blood cells at outside hospital      dyslipidemia,  -Statin     hypertension,  - Atenolol    History of bleeding ulcer  -Protonix IV    A-fib on Eliquis  -Eliquis currently on hold  -Amiodarone    hyperthyroidism  -Methimazole    DVT prophylaxis-SCDs, patient Eliquis currently on hold due to GI bleed       Nazario Young, APRN-CNP

## 2024-09-04 NOTE — ED NOTES
Upon entering room pt was sitting on the bedside commode with a laceration above her right eyebrow that is bleeding. Pt states she got up to use the BSC and fell and hit her head. Pt is a&ox3, states she couldn't wait she was going to have diarrhea so she got up on her own. Pt is on a blood thinner-eliquis. Dr. Griffin at bedside to assess patient. Trauma alert called due to patient hitting her head. VS obtained and stable, patient brought to CT immediately. Bed alarm placed on patients bed, pt reeducated that she needs to call for assistance when getting out of bed.      Melissa Gill RN  09/03/24 2772       Melissa Gill RN  09/03/24 4921

## 2024-09-05 ENCOUNTER — APPOINTMENT (OUTPATIENT)
Dept: RADIOLOGY | Facility: HOSPITAL | Age: 84
DRG: 378 | End: 2024-09-05
Payer: MEDICARE

## 2024-09-05 ENCOUNTER — ANESTHESIA EVENT (OUTPATIENT)
Dept: GASTROENTEROLOGY | Facility: HOSPITAL | Age: 84
End: 2024-09-05
Payer: MEDICARE

## 2024-09-05 LAB
ANION GAP SERPL CALC-SCNC: 13 MMOL/L (ref 10–20)
BUN SERPL-MCNC: 22 MG/DL (ref 6–23)
CALCIUM SERPL-MCNC: 7.2 MG/DL (ref 8.6–10.3)
CHLORIDE SERPL-SCNC: 109 MMOL/L (ref 98–107)
CO2 SERPL-SCNC: 20 MMOL/L (ref 21–32)
CREAT SERPL-MCNC: 0.83 MG/DL (ref 0.5–1.05)
EGFRCR SERPLBLD CKD-EPI 2021: 70 ML/MIN/1.73M*2
ERYTHROCYTE [DISTWIDTH] IN BLOOD BY AUTOMATED COUNT: 17.1 % (ref 11.5–14.5)
ERYTHROCYTE [DISTWIDTH] IN BLOOD BY AUTOMATED COUNT: 17.8 % (ref 11.5–14.5)
GLUCOSE SERPL-MCNC: 85 MG/DL (ref 74–99)
HCT VFR BLD AUTO: 21.9 % (ref 36–46)
HCT VFR BLD AUTO: 23 % (ref 36–46)
HCT VFR BLD AUTO: 23.2 % (ref 36–46)
HGB BLD-MCNC: 7.3 G/DL (ref 12–16)
HGB BLD-MCNC: 7.3 G/DL (ref 12–16)
HGB BLD-MCNC: 8 G/DL (ref 12–16)
MCH RBC QN AUTO: 28.5 PG (ref 26–34)
MCH RBC QN AUTO: 29.4 PG (ref 26–34)
MCHC RBC AUTO-ENTMCNC: 31.5 G/DL (ref 32–36)
MCHC RBC AUTO-ENTMCNC: 33.3 G/DL (ref 32–36)
MCV RBC AUTO: 86 FL (ref 80–100)
MCV RBC AUTO: 94 FL (ref 80–100)
NRBC BLD-RTO: 0 /100 WBCS (ref 0–0)
NRBC BLD-RTO: 0 /100 WBCS (ref 0–0)
PLATELET # BLD AUTO: 185 X10*3/UL (ref 150–450)
PLATELET # BLD AUTO: 207 X10*3/UL (ref 150–450)
POTASSIUM SERPL-SCNC: 3.9 MMOL/L (ref 3.5–5.3)
RBC # BLD AUTO: 2.48 X10*6/UL (ref 4–5.2)
RBC # BLD AUTO: 2.56 X10*6/UL (ref 4–5.2)
SODIUM SERPL-SCNC: 138 MMOL/L (ref 136–145)
WBC # BLD AUTO: 11.6 X10*3/UL (ref 4.4–11.3)
WBC # BLD AUTO: 11.8 X10*3/UL (ref 4.4–11.3)

## 2024-09-05 PROCEDURE — 2500000001 HC RX 250 WO HCPCS SELF ADMINISTERED DRUGS (ALT 637 FOR MEDICARE OP): Performed by: NURSE PRACTITIONER

## 2024-09-05 PROCEDURE — 99232 SBSQ HOSP IP/OBS MODERATE 35: CPT | Performed by: NURSE PRACTITIONER

## 2024-09-05 PROCEDURE — 2500000002 HC RX 250 W HCPCS SELF ADMINISTERED DRUGS (ALT 637 FOR MEDICARE OP, ALT 636 FOR OP/ED): Performed by: NURSE PRACTITIONER

## 2024-09-05 PROCEDURE — 74174 CTA ABD&PLVS W/CONTRAST: CPT | Performed by: RADIOLOGY

## 2024-09-05 PROCEDURE — 36415 COLL VENOUS BLD VENIPUNCTURE: CPT | Performed by: NURSE PRACTITIONER

## 2024-09-05 PROCEDURE — 85014 HEMATOCRIT: CPT | Performed by: NURSE PRACTITIONER

## 2024-09-05 PROCEDURE — 1200000002 HC GENERAL ROOM WITH TELEMETRY DAILY

## 2024-09-05 PROCEDURE — 80048 BASIC METABOLIC PNL TOTAL CA: CPT | Performed by: NURSE PRACTITIONER

## 2024-09-05 PROCEDURE — 85027 COMPLETE CBC AUTOMATED: CPT | Performed by: INTERNAL MEDICINE

## 2024-09-05 PROCEDURE — 99221 1ST HOSP IP/OBS SF/LOW 40: CPT | Performed by: NURSE PRACTITIONER

## 2024-09-05 PROCEDURE — 99232 SBSQ HOSP IP/OBS MODERATE 35: CPT | Performed by: INTERNAL MEDICINE

## 2024-09-05 PROCEDURE — 2550000001 HC RX 255 CONTRASTS: Performed by: INTERNAL MEDICINE

## 2024-09-05 PROCEDURE — 2500000004 HC RX 250 GENERAL PHARMACY W/ HCPCS (ALT 636 FOR OP/ED): Performed by: NURSE PRACTITIONER

## 2024-09-05 PROCEDURE — 74174 CTA ABD&PLVS W/CONTRAST: CPT

## 2024-09-05 RX ORDER — SODIUM CHLORIDE, SODIUM LACTATE, POTASSIUM CHLORIDE, CALCIUM CHLORIDE 600; 310; 30; 20 MG/100ML; MG/100ML; MG/100ML; MG/100ML
20 INJECTION, SOLUTION INTRAVENOUS CONTINUOUS
Status: CANCELLED | OUTPATIENT
Start: 2024-09-05

## 2024-09-05 RX ORDER — DICLOFENAC SODIUM 10 MG/G
4 GEL TOPICAL 4 TIMES DAILY PRN
COMMUNITY

## 2024-09-05 RX ORDER — LIDOCAINE HYDROCHLORIDE 10 MG/ML
0.1 INJECTION, SOLUTION EPIDURAL; INFILTRATION; INTRACAUDAL; PERINEURAL ONCE
Status: CANCELLED | OUTPATIENT
Start: 2024-09-05 | End: 2024-09-05

## 2024-09-05 RX ORDER — ONDANSETRON HYDROCHLORIDE 2 MG/ML
4 INJECTION, SOLUTION INTRAVENOUS ONCE AS NEEDED
Status: CANCELLED | OUTPATIENT
Start: 2024-09-05

## 2024-09-05 RX ORDER — SODIUM CHLORIDE, SODIUM LACTATE, POTASSIUM CHLORIDE, CALCIUM CHLORIDE 600; 310; 30; 20 MG/100ML; MG/100ML; MG/100ML; MG/100ML
100 INJECTION, SOLUTION INTRAVENOUS CONTINUOUS
Status: CANCELLED | OUTPATIENT
Start: 2024-09-05

## 2024-09-05 RX ORDER — POLYETHYLENE GLYCOL 3350, SODIUM CHLORIDE, SODIUM BICARBONATE, POTASSIUM CHLORIDE 420; 11.2; 5.72; 1.48 G/4L; G/4L; G/4L; G/4L
4000 POWDER, FOR SOLUTION ORAL ONCE
Status: COMPLETED | OUTPATIENT
Start: 2024-09-05 | End: 2024-09-05

## 2024-09-05 RX ADMIN — PANTOPRAZOLE SODIUM 40 MG: 40 INJECTION, POWDER, FOR SOLUTION INTRAVENOUS at 08:26

## 2024-09-05 RX ADMIN — AMIODARONE HYDROCHLORIDE 200 MG: 200 TABLET ORAL at 08:26

## 2024-09-05 RX ADMIN — IOHEXOL 75 ML: 350 INJECTION, SOLUTION INTRAVENOUS at 12:24

## 2024-09-05 RX ADMIN — ATORVASTATIN CALCIUM 40 MG: 40 TABLET, FILM COATED ORAL at 20:30

## 2024-09-05 RX ADMIN — PANTOPRAZOLE SODIUM 40 MG: 40 INJECTION, POWDER, FOR SOLUTION INTRAVENOUS at 20:30

## 2024-09-05 RX ADMIN — POLYETHYLENE GLYCOL 3350, SODIUM SULFATE ANHYDROUS, SODIUM BICARBONATE, SODIUM CHLORIDE, POTASSIUM CHLORIDE 4000 ML: 236; 22.74; 6.74; 5.86; 2.97 POWDER, FOR SOLUTION ORAL at 17:11

## 2024-09-05 RX ADMIN — FERROUS SULFATE TAB 325 MG (65 MG ELEMENTAL FE) 325 MG: 325 (65 FE) TAB at 08:26

## 2024-09-05 RX ADMIN — METHIMAZOLE 5 MG: 5 TABLET ORAL at 08:26

## 2024-09-05 RX ADMIN — ATENOLOL 50 MG: 50 TABLET ORAL at 08:26

## 2024-09-05 ASSESSMENT — COGNITIVE AND FUNCTIONAL STATUS - GENERAL
CLIMB 3 TO 5 STEPS WITH RAILING: A LOT
HELP NEEDED FOR BATHING: A LITTLE
TOILETING: A LITTLE
MOVING TO AND FROM BED TO CHAIR: A LITTLE
WALKING IN HOSPITAL ROOM: A LITTLE
HELP NEEDED FOR BATHING: A LITTLE
STANDING UP FROM CHAIR USING ARMS: A LITTLE
TOILETING: A LITTLE
DRESSING REGULAR LOWER BODY CLOTHING: A LITTLE
STANDING UP FROM CHAIR USING ARMS: A LITTLE
DAILY ACTIVITIY SCORE: 20
WALKING IN HOSPITAL ROOM: A LITTLE
CLIMB 3 TO 5 STEPS WITH RAILING: A LITTLE
MOBILITY SCORE: 20
DRESSING REGULAR UPPER BODY CLOTHING: A LITTLE
DRESSING REGULAR UPPER BODY CLOTHING: A LITTLE
MOVING TO AND FROM BED TO CHAIR: A LITTLE
DRESSING REGULAR LOWER BODY CLOTHING: A LITTLE
MOBILITY SCORE: 19
DAILY ACTIVITIY SCORE: 20

## 2024-09-05 ASSESSMENT — PAIN SCALES - GENERAL
PAINLEVEL_OUTOF10: 0 - NO PAIN
PAINLEVEL_OUTOF10: 0 - NO PAIN

## 2024-09-05 ASSESSMENT — ACTIVITIES OF DAILY LIVING (ADL): LACK_OF_TRANSPORTATION: NO

## 2024-09-05 ASSESSMENT — PAIN - FUNCTIONAL ASSESSMENT: PAIN_FUNCTIONAL_ASSESSMENT: 0-10

## 2024-09-05 NOTE — PROGRESS NOTES
GI Daily Progress Note    Assessment/Plan:    85 yo female presented with acute GI bleeding, likely lower bleeding in the setting of diverticulosis and anticoagulation, with possible active bleeding in the proximal to mid ascending colon. H&H dropped, but stable    -ok to advance diet as tolerated  -monitor H&H and transfuse as needed to keep Hgb>7  -hold eliquis  -Could consider colonoscopy if bleeding continues   -she would likely benefit from a Watchman procedure so she could stop her anticoagulation, given her recurrent diverticular bleeding episodes.      LOS: 1 day     Dianne Clay is a 84 y.o. female who was admitted with GI bleed. She reports his symptoms are improving with treatment.  Had small bloody stool this morning, dark.  H&H dropped, but stable.      Subjective:    Patient expresses no new complaints  Patient denies abdominal pain, nausea, vomiting    Objective:    Vital signs in last 24 hours:  Temp:  [36.2 °C (97.2 °F)-37.4 °C (99.4 °F)] 37.1 °C (98.7 °F)  Heart Rate:  [] 74  Resp:  [16-18] 16  BP: ()/(46-70) 108/62    Intake/Output last 3 shifts:  I/O last 3 completed shifts:  In: - (0 mL/kg)   Out: 350 (6.7 mL/kg) [Stool:350]  Weight: 52.2 kg   Intake/Output this shift:  No intake/output data recorded.    Physical Exam  Vitals reviewed.   Constitutional:       Appearance: Normal appearance.   HENT:      Head: Normocephalic and atraumatic.      Nose: Nose normal.      Mouth/Throat:      Mouth: Mucous membranes are moist.      Pharynx: Oropharynx is clear.   Eyes:      Conjunctiva/sclera: Conjunctivae normal.   Cardiovascular:      Rate and Rhythm: Normal rate. Rhythm irregular.      Heart sounds: Normal heart sounds.   Pulmonary:      Breath sounds: Normal breath sounds.   Abdominal:      General: Bowel sounds are normal. There is no distension.      Palpations: Abdomen is soft.      Tenderness: There is no abdominal tenderness. There is no guarding.   Skin:     General: Skin is warm  and dry.   Neurological:      General: No focal deficit present.      Mental Status: She is alert. Mental status is at baseline.   Psychiatric:         Mood and Affect: Mood normal.         Behavior: Behavior normal.          Results for orders placed or performed during the hospital encounter of 09/04/24 (from the past 24 hour(s))   Hemoglobin and hematocrit, blood   Result Value Ref Range    Hemoglobin 8.5 (L) 12.0 - 16.0 g/dL    Hematocrit 24.1 (L) 36.0 - 46.0 %   Hemoglobin and hematocrit, blood   Result Value Ref Range    Hemoglobin 8.0 (L) 12.0 - 16.0 g/dL    Hematocrit 23.0 (L) 36.0 - 46.0 %   Basic metabolic panel   Result Value Ref Range    Glucose 85 74 - 99 mg/dL    Sodium 138 136 - 145 mmol/L    Potassium 3.9 3.5 - 5.3 mmol/L    Chloride 109 (H) 98 - 107 mmol/L    Bicarbonate 20 (L) 21 - 32 mmol/L    Anion Gap 13 10 - 20 mmol/L    Urea Nitrogen 22 6 - 23 mg/dL    Creatinine 0.83 0.50 - 1.05 mg/dL    eGFR 70 >60 mL/min/1.73m*2    Calcium 7.2 (L) 8.6 - 10.3 mg/dL

## 2024-09-05 NOTE — PROGRESS NOTES
09/05/24 1128   [facility] Disability Status   Are you deaf or do you have serious difficulty hearing? N   Are you blind or do you have serious difficulty seeing, even when wearing glasses? N   Because of a physical, mental, or emotional condition, do you have serious difficulty concentrating, remembering, or making decisions? (5 years old or older) N   Do you have serious difficulty walking or climbing stairs? N   Do you have serious difficulty dressing or bathing? N   Because of a physical, mental, or emotional condition, do you have serious difficulty doing errands alone such as visiting the doctor? N

## 2024-09-05 NOTE — PROGRESS NOTES
09/05/24 1128   Current Planned Discharge Disposition   Current Planned Discharge Disposition Home        Biopsy Type: H and E

## 2024-09-05 NOTE — CARE PLAN
The patient's goals for the shift include      The clinical goals for the shift include Will remain hemodynamically stable throughout shift ending 9/5/24 @ 0700 hrs.    Goal met. Minimal urine output overnight. I count of bloody BM once on this shift. VS at baseline. Serial H & H ongoing.

## 2024-09-05 NOTE — PROGRESS NOTES
Dianne Clay is a 84 y.o. female on day 1 of admission presenting with GI bleed.      Subjective   Patient was seen and examined at bedside this morning, stated that she came from [facility], after being seen at [facility] for GI bleed.  She reports large amount of blood per rectum, without bowel movement, admits to low back pain, nausea and denied fever, chills, vomiting, headache, abdominal cramp or any other symptom at that time.  Patient stated that she has prior history of GI bleed, and had a colonoscopy during 1 admission at [facility], but no acute bleed found at that time.       Objective     Last Recorded Vitals  /62   Pulse 74   Temp 37.1 °C (98.7 °F) (Temporal)   Resp 16   Wt 52.2 kg (115 lb)   SpO2 95%   Intake/Output last 3 Shifts:    Intake/Output Summary (Last 24 hours) at 9/5/2024 1147  Last data filed at 9/5/2024 0430  Gross per 24 hour   Intake --   Output 350 ml   Net -350 ml       Admission Weight  Weight: 52.2 kg (115 lb) (09/04/24 1309)    Daily Weight  09/04/24 : 52.2 kg (115 lb)    Image Results  CT head W O contrast trauma protocol, CT cervical spine wo IV contrast  Narrative: Interpreted By:  Demond Bauer,   STUDY:  CT HEAD W/O CONTRAST TRAUMA PROTOCOL; CT CERVICAL SPINE WO IV  CONTRAST;  9/3/2024 11:02 pm      INDICATION:  Signs/Symptoms:fall on thinners; Signs/Symptoms:fall          COMPARISON:  None.      ACCESSION NUMBER(S):  [id number]; [id number]      ORDERING CLINICIAN:  CONSTANZA ARMSTRONG      TECHNIQUE:  Axial noncontrast CT images of head with coronal and sagittal  reconstructed images. Axial noncontrast CT images of the cervical  spine with coronal and sagittal reconstructed images.      FINDINGS:  CT HEAD:      BRAIN PARENCHYMA: Gray-white differentiation is preserved. No  mass-effect, midline shift or effacement of cerebral sulci. Mild  periventricular and subcortical white matter hypodensities,  nonspecific but often seen in the setting of  chronic microangiopathic  change.      HEMORRHAGE: No acute intracranial hemorrhage.      VENTRICLES and EXTRA-AXIAL SPACES: The ventricles and sulci are  within normal limits for brain volume. No abnormal extra-axial fluid  collection.      ORBITS: The visualized orbits and globes are within normal limits.      EXTRACRANIAL SOFT TISSUES: Mild right supraorbital soft tissue  swelling.      PARANASAL SINUSES/MASTOIDS: The visualized paranasal sinuses and  mastoid air cells are well aerated.      CALVARIUM: No depressed skull fracture.                  CT CERVICAL SPINE:      CRANIOCERVICAL JUNCTION: Intact.      ALIGNMENT: No traumatic malalignment or traumatic facet widening.      VERTEBRAE/DISC SPACES: No acute fracture. Vertebral body heights are  maintained. Mild-to-moderate disc space height loss with associated  degenerative endplate changes and mild bilateral facet arthrosis. No  high grade spinal canal stenosis evident by CT.      SOFT TISSUES: Enlarged multinodular thyroid gland. This may be  further assessed with outpatient ultrasound.      OTHER: Suggestion of tree-in-bud opacities within the visualized lung  apices, possibly reflecting an infectious or inflammatory process.      Impression: CT HEAD:  1. No acute intracranial abnormality or calvarial fracture.  2. Mild right supraorbital soft tissue swelling.          CT CERVICAL SPINE:  1. No acute fracture or traumatic malalignment of the cervical spine.  2. Enlarged multinodular thyroid gland, this may be further assessed  with outpatient ultrasound.  3. Suggestion of tree-in-bud opacities in the lung apices which may  reflect an infectious or inflammatory process.      MACRO:  None      Signed by: Demond Bauer 9/3/2024 11:23 PM  Dictation workstation:   [id number]      Physical Exam  Constitutional: Pleasant mildly emaciated elderly female, alert active, cooperative not in acute distress  Eyes: PERRLA, clear sclera  ENMT: Moist mucosal membranes, no  exudate  Head / Neck: Atraumatic, normocephalic, supple neck, JVP not visualized  Lungs: Patent airways, CTABL  Heart: RRR, S1S2, no murmurs appreciated, palpable pulses in all extremities  GI: Soft, slightly tender to palpation, ND, bowel sounds present in all quadrants  MSK: Moves all extremities freely, no restriction  of ROM, no joint edema  Extremities: Intact x 4, no peripheral edema  : No Pendleton catheter inserted  Breast: Deferred  Neurological: AAO x 3 to person, place and date, facial muscles symmetrical, sensation intact, strength 4/4, no acute focal neurological deficits appreciated  Psychological: Appropriate mood and behavior    Relevant Results             Scheduled medications  amiodarone, 200 mg, oral, Daily  [Held by provider] apixaban, 2.5 mg, oral, BID  [Held by provider] aspirin, 81 mg, oral, Daily  atenolol, 50 mg, oral, Daily  atorvastatin, 40 mg, oral, Daily  ferrous sulfate (325 mg ferrous sulfate), 1 tablet, oral, Daily  methIMAzole, 5 mg, oral, Daily  pantoprazole, 40 mg, intravenous, BID      Continuous medications     PRN medications  PRN medications: acetaminophen **OR** acetaminophen **OR** [DISCONTINUED] acetaminophen    Assessment/Plan      84 y.o. female with a past medical history dyslipidemia, hypertension, bleeding ulcer, A-fib on Eliquis, hyperthyroidism presenting due to bloody bowel movements, patient came as a transfer from [facility].  Patient originally presented to outside [facility] hospital yesterday and left AMA as she did not want to be transferred to [facility] and went to [facility] yesterday.  Patient with multiple dark bloody bowel movements that started around 7 PM, 2 days ago       Acute anemia secondary to GI losses  -Presented with H&H of 8.4/26.4  -GI losses presenting as bright red blood per rectum  -Hold anticoagulation (aspirin 81 mg daily, Eliquis 2.5 mg twice daily)  -Initial CT at outside hospital ([facility]) inconclusive, and showing  active bleeding and slight  -IR consulted for possible embolization: No intervention at this time outside of bleeding and identified  -Repeat stat CT of abdomen pelvis with IV contrast shows no bleeding but presented with abnormal finding  -Continue H&H monitoring, last 2 labs showed hemoglobin 7.3 appears to stabilize  -No reported bright red blood per rectum after episode on the floor in late a.m.  -GI consulted: N.p.o. after midnight for colonoscopy    Hypertension stable  -Continue home meds: Atenolol 50 mg daily    Hyperlipidemia  -Atorvastatin 40 mg daily    Atrial fibrillation  -Amiodarone 200 mg daily  -On Eliquis, currently on hold    Diet  -N.p.o. after midnight    DVT prophylaxis  -Omission of heparin anticoagulation as patient on Eliquis but on hold for acute GI bleed    Disposition: Presented with acute GI bleed, bleeding site not localized, GI on board for colonoscopy, discharge pending clinical improvement.                    Dirk Bella DO

## 2024-09-05 NOTE — PROGRESS NOTES
Pharmacy Medication History Review    Dianne Clay is a 84 y.o. female admitted for GI bleed. Pharmacy reviewed the patient's [id number] medications and allergies for accuracy.    The list below reflectives the updated PTA list. Please review each medication in order reconciliation for additional clarification and justification.  Medications Prior to Admission   Medication Sig Dispense Refill Last Dose    amiodarone (Pacerone) 200 mg tablet Take 1 tablet by mouth once daily with a meal. 30 tablet 4 Past Week    apixaban (Eliquis) 2.5 mg tablet Take 1 tablet (2.5 mg) by mouth 2 times a day. 90 tablet 0 Past Week    atenolol (Tenormin) 50 mg tablet TAKE ONE (1) TABLET EVERY DAY 90 tablet 1 Past Week    atorvastatin (Lipitor) 40 mg tablet TAKE 1 TABLET BY MOUTH DAILY 90 tablet 1 Past Week    diclofenac sodium (Voltaren Arthritis Pain) 1 % gel Apply 4.5 inches (4 g) topically 4 times a day as needed (Arthritic pain).   Past Week    FeroSuL tablet TAKE 1 TABLET BY MOUTH DAILY WITH FOOD 90 tablet 2 Past Week    methIMAzole (Tapazole) 5 mg tablet TAKE 1 TABLET BY MOUTH ONCE DAILY. 30 tablet 8 Past Week    pantoprazole (ProtoNix) 40 mg EC tablet TAKE 1 TABLET BY MOUTH ONCE DAILY 30 tablet 2 Past Week        The list below reflectives the updated allergy list. Please review each documented allergy for additional clarification and justification.  Allergies  Reviewed by Liya Delgado PharmD on 9/5/2024        Severity Reactions Comments    Bee Venom Protein (honey Bee) High Swelling             Below are additional concerns with the patient's PTA list.  The following updates were made to the Prior to Admission medication list:     Source of Information:     Medications ADDED:   Voltaren Gel QID PRN   Medications CHANGED:  N/A  Medications REMOVED:   Aspirin 81 mg, patient states she is no longer taking  Medications NOT TAKING:   N/A    Allergy reviewed : Yes    Comments: Patient is not able to completely remember  which medications she is taking but was able to recognize them once I read them to her. Spoke to patient via telephone.      Liya Delgado, JelenaD

## 2024-09-05 NOTE — CARE PLAN
The patient's goals for the shift include      The clinical goals for the shift include Will remain hemodynamically stable throughout shift ending 9/5/24 @ 0700 hrs.    Problem: Fall/Injury  Goal: Not fall by end of shift  Outcome: Progressing  Goal: Be free from injury by end of the shift  Outcome: Progressing  Goal: Verbalize understanding of personal risk factors for fall in the hospital  Outcome: Progressing  Goal: Verbalize understanding of risk factor reduction measures to prevent injury from fall in the home  Outcome: Progressing  Goal: Use assistive devices by end of the shift  Outcome: Progressing  Goal: Pace activities to prevent fatigue by end of the shift  Outcome: Progressing

## 2024-09-05 NOTE — PROGRESS NOTES
09/05/24 1124   Discharge Planning   Living Arrangements Alone   Support Systems Children;Friends/neighbors   Assistance Needed Independent for ADLS, neighbor takes to appointments if daughter cannot   Type of Residence Private residence   Number of Stairs to Enter Residence 0  (has a ramp)   Number of Stairs Within Residence 13   Do you have animals or pets at home? Yes   Type of Animals or Pets dog   Who is requesting discharge planning? Provider   Home or Post Acute Services None   Expected Discharge Disposition Home   Does the patient need discharge transport arranged? No   Financial Resource Strain   How hard is it for you to pay for the very basics like food, housing, medical care, and heating? Not hard   Housing Stability   In the last 12 months, was there a time when you were not able to pay the mortgage or rent on time? N   In the past 12 months, how many times have you moved where you were living? 0   At any time in the past 12 months, were you homeless or living in a shelter (including now)? N   Transportation Needs   In the past 12 months, has lack of transportation kept you from medical appointments or from getting medications? no   In the past 12 months, has lack of transportation kept you from meetings, work, or from getting things needed for daily living? No     Met with patient at the bedside to discuss discharge plan.  She plans to return home with no homecare needs.  PLAN/BARRIER: GI consult for GI bleed  DISP: home   ADOD: 1-2 days  Dinah Shannon RN

## 2024-09-05 NOTE — SIGNIFICANT EVENT
Notified by primary team of increased bloody stools.   Patient sent for stat CTA.   Repeat hgb 7.3, from 8 this morning. VSS.     CTA was completed, without evidence of active bleed. Present symptoms may represent slow diverticular bleed which may be conservatively managed. No indication for embolization.     There is an area of enhancement in the right ascending colon, felt consistent with mild intussusception, bowel wall enhancement, for which malignancy should be excluded. Recommend non urgent colonoscopy for further visualization per GI.

## 2024-09-05 NOTE — CONSULTS
Consults    Reason For Consult  Consulted by GI for GIB    History Of Present Illness  Dianne Clay is a 84 y.o. female presenting with dark bloody stool. She presented from OSH with multiple dark bloody movements starting 2 days ago. She takes Eliquis for A-fib, last dose yesterday. She initally presented to [facility], where a CT A/P was completed. She was recommended transfer to [facility] where she did not want to go, so she left AMA and presented to [facility]. While she was at [facility], she was noted to have hypotension and fell and hit her head. CT head and neck were benign. Hgb on admission was 10.3 at [facility], dropped to 8.4 at [facility]. She was transfused 1 unit PRBC and transferred to [facility]. Presently she denies dizziness or headache. She had one dark black BM today. No bright red blood. IR consulted for embolization of GIB.      Past Medical History  She has a past medical history of Allergic contact dermatitis, unspecified cause (2015), Contusion of unspecified knee, initial encounter (2018), Effusion, unspecified knee (2019), Encounter for follow-up examination after completed treatment for conditions other than malignant neoplasm (2022), Noninfective gastroenteritis and colitis, unspecified (2022), Noninfective gastroenteritis and colitis, unspecified, Other conditions influencing health status (2021), Other conditions influencing health status, Pain in left knee (2014), Personal history of other diseases of the musculoskeletal system and connective tissue, Personal history of other specified conditions (10/21/2016), Personal history of other specified conditions (2015), and Personal history of pneumonia (recurrent) (2022).    Surgical History  She has a past surgical history that includes Colonoscopy (2014);  section, classic (2014); Cataract extraction (2014); Coronary angioplasty with stent (2014); Other surgical  history (01/04/2023); Upper gastrointestinal endoscopy; and Upper gastrointestinal endoscopy.     Social History  She reports that she has never smoked. She has never used smokeless tobacco. She reports that she does not currently use alcohol. She reports that she does not use drugs.    Family History  Family History   Problem Relation Name Age of Onset    Other (cardiac disorder) Mother      Other (cardiac disorder) Father      Other (cardiac disorder) Sister      Cancer Sister          Allergies  Bee venom protein (honey bee)    MEDS:    Current Facility-Administered Medications:     acetaminophen (Tylenol) tablet 650 mg, 650 mg, oral, q4h PRN **OR** acetaminophen (Tylenol) oral liquid 650 mg, 650 mg, oral, q4h PRN **OR** [DISCONTINUED] acetaminophen (Tylenol) suppository 650 mg, 650 mg, rectal, q4h PRN, ADARSH Chatterjee    amiodarone (Pacerone) tablet 200 mg, 200 mg, oral, Daily, ADARSH Chatterjee, 200 mg at 09/05/24 0826    [Held by provider] apixaban (Eliquis) tablet 2.5 mg, 2.5 mg, oral, BID, ADARSH Chatterjee    [Held by provider] aspirin EC tablet 81 mg, 81 mg, oral, Daily, JEREMY Chatterjee-CNP    atenolol (Tenormin) tablet 50 mg, 50 mg, oral, Daily, JEREMY Chatterjee-CNP, 50 mg at 09/05/24 0826    atorvastatin (Lipitor) tablet 40 mg, 40 mg, oral, Daily, JEREMY Chatterjee-CNP, 40 mg at 09/04/24 2154    ferrous sulfate (325 mg ferrous sulfate) tablet 325 mg, 1 tablet, oral, Daily, JEREMY Chatterjee-CNP, 325 mg at 09/05/24 0826    methIMAzole (Tapazole) tablet 5 mg, 5 mg, oral, Daily, JEREMY Chatterjee-CNP, 5 mg at 09/05/24 0826    pantoprazole (ProtoNix) injection 40 mg, 40 mg, intravenous, BID, JEREMY Chatterjee-CNP, 40 mg at 09/05/24 0826    Review of Systems  History obtained from chart review and the patient  General ROS: positive for  - fatigue  Respiratory ROS: no cough, shortness of breath, or wheezing  Cardiovascular ROS: no chest pain or dyspnea on  exertion  Gastrointestinal ROS: positive for - blood in stools and change in stools  Genitourinary ROS: no dysuria, trouble voiding, or hematuria     Last Recorded Vitals  /65   Pulse 100   Temp 36.9 °C (98.5 °F) (Temporal)   Resp 18   Wt 52.2 kg (115 lb)   SpO2 99%      Physical Exam  Orientation: oriented to person, place, time, and general circumstances  HEENT: normocephalic, atraumatic  Pulm: normal  Cardiac: Regular rate and rhythm  GI: soft, nontender, normal bowel sounds  Pulses: peripheral pulses symmetrical    Relevant Results    LABS:  Lab Results   Component Value Date    WBC 11.9 (H) 09/04/2024    HGB 8.0 (L) 09/05/2024    HCT 23.0 (L) 09/05/2024    MCV 90 09/04/2024     09/04/2024      Results from last 72 hours   Lab Units 09/05/24  0658   SODIUM mmol/L 138   POTASSIUM mmol/L 3.9   CHLORIDE mmol/L 109*   CO2 mmol/L 20*   BUN mg/dL 22   CREATININE mg/dL 0.83   GLUCOSE mg/dL 85   CALCIUM mg/dL 7.2*   ANION GAP mmol/L 13   EGFR mL/min/1.73m*2 70     Results from last 72 hours   Lab Units 09/04/24  1345   ALK PHOS U/L 73   BILIRUBIN TOTAL mg/dL 1.1   PROTEIN TOTAL g/dL 7.2   ALT U/L 5*   AST U/L 12   ALBUMIN g/dL 2.8*     Results from last 72 hours   Lab Units 09/04/24  1345   INR  1.1       MICRO:  No results found for the last 14 days.      IMAGING:   Consult to Interventional Radiology    (Results Pending)        Assessment/Plan     This is an 84 year old female admitted from [facility] for suspected GIB.   She was transfused one unit PRBC before arrival.   Hgb 8.0 today.   VSS, patient presently asymptomatic other than passing one black BM today.     There are no images in PACs to review.   There is a report of a CT A/P with IV contrast from [facility] (images requested),  which notes an increased density to the proximal to mid ascending colon with the possibility of GIB.   No arterial or venous phase images to delineate arterial bleed or target for embolization. No indication for acute  intervention at this time.     Recommend Holding Eliquis.   Continue to monitor hgb and vital signs for acute GIB. If active bleed, recommend stat CTA A/P for localization.  If remains stable, would defer to GI for colonoscopy.       Kaitlin Greene, APRN-CNP

## 2024-09-06 ENCOUNTER — APPOINTMENT (OUTPATIENT)
Dept: GASTROENTEROLOGY | Facility: HOSPITAL | Age: 84
End: 2024-09-06
Payer: MEDICARE

## 2024-09-06 ENCOUNTER — ANESTHESIA (OUTPATIENT)
Dept: GASTROENTEROLOGY | Facility: HOSPITAL | Age: 84
End: 2024-09-06
Payer: MEDICARE

## 2024-09-06 LAB
ABO GROUP (TYPE) IN BLOOD: NORMAL
ANTIBODY SCREEN: NORMAL
ERYTHROCYTE [DISTWIDTH] IN BLOOD BY AUTOMATED COUNT: 17.6 % (ref 11.5–14.5)
HCT VFR BLD AUTO: 22.9 % (ref 36–46)
HGB BLD-MCNC: 7.2 G/DL (ref 12–16)
HOLD SPECIMEN: NORMAL
MCH RBC QN AUTO: 29.5 PG (ref 26–34)
MCHC RBC AUTO-ENTMCNC: 31.4 G/DL (ref 32–36)
MCV RBC AUTO: 94 FL (ref 80–100)
NRBC BLD-RTO: 0 /100 WBCS (ref 0–0)
PLATELET # BLD AUTO: 205 X10*3/UL (ref 150–450)
RBC # BLD AUTO: 2.44 X10*6/UL (ref 4–5.2)
RH FACTOR (ANTIGEN D): NORMAL
WBC # BLD AUTO: 10.4 X10*3/UL (ref 4.4–11.3)

## 2024-09-06 PROCEDURE — 86901 BLOOD TYPING SEROLOGIC RH(D): CPT | Performed by: ANESTHESIOLOGY

## 2024-09-06 PROCEDURE — 2500000001 HC RX 250 WO HCPCS SELF ADMINISTERED DRUGS (ALT 637 FOR MEDICARE OP): Performed by: NURSE PRACTITIONER

## 2024-09-06 PROCEDURE — 1200000002 HC GENERAL ROOM WITH TELEMETRY DAILY

## 2024-09-06 PROCEDURE — 2500000004 HC RX 250 GENERAL PHARMACY W/ HCPCS (ALT 636 FOR OP/ED): Performed by: ANESTHESIOLOGY

## 2024-09-06 PROCEDURE — 2500000002 HC RX 250 W HCPCS SELF ADMINISTERED DRUGS (ALT 637 FOR MEDICARE OP, ALT 636 FOR OP/ED): Performed by: NURSE PRACTITIONER

## 2024-09-06 PROCEDURE — 3700000001 HC GENERAL ANESTHESIA TIME - INITIAL BASE CHARGE

## 2024-09-06 PROCEDURE — 3700000002 HC GENERAL ANESTHESIA TIME - EACH INCREMENTAL 1 MINUTE

## 2024-09-06 PROCEDURE — 2500000005 HC RX 250 GENERAL PHARMACY W/O HCPCS

## 2024-09-06 PROCEDURE — 36415 COLL VENOUS BLD VENIPUNCTURE: CPT | Performed by: ANESTHESIOLOGY

## 2024-09-06 PROCEDURE — 7100000010 HC PHASE TWO TIME - EACH INCREMENTAL 1 MINUTE

## 2024-09-06 PROCEDURE — 2500000004 HC RX 250 GENERAL PHARMACY W/ HCPCS (ALT 636 FOR OP/ED)

## 2024-09-06 PROCEDURE — 86923 COMPATIBILITY TEST ELECTRIC: CPT

## 2024-09-06 PROCEDURE — 85027 COMPLETE CBC AUTOMATED: CPT | Performed by: NURSE PRACTITIONER

## 2024-09-06 PROCEDURE — 2500000005 HC RX 250 GENERAL PHARMACY W/O HCPCS: Performed by: INTERNAL MEDICINE

## 2024-09-06 PROCEDURE — 7100000009 HC PHASE TWO TIME - INITIAL BASE CHARGE

## 2024-09-06 PROCEDURE — 36415 COLL VENOUS BLD VENIPUNCTURE: CPT | Performed by: NURSE PRACTITIONER

## 2024-09-06 PROCEDURE — 99232 SBSQ HOSP IP/OBS MODERATE 35: CPT | Performed by: INTERNAL MEDICINE

## 2024-09-06 PROCEDURE — 2500000004 HC RX 250 GENERAL PHARMACY W/ HCPCS (ALT 636 FOR OP/ED): Performed by: NURSE PRACTITIONER

## 2024-09-06 PROCEDURE — 45378 DIAGNOSTIC COLONOSCOPY: CPT | Performed by: INTERNAL MEDICINE

## 2024-09-06 PROCEDURE — 45378 DIAGNOSTIC COLONOSCOPY: CPT

## 2024-09-06 RX ORDER — PHENYLEPHRINE HCL IN 0.9% NACL 1 MG/10 ML
SYRINGE (ML) INTRAVENOUS AS NEEDED
Status: DISCONTINUED | OUTPATIENT
Start: 2024-09-06 | End: 2024-09-06

## 2024-09-06 RX ORDER — PROPOFOL 10 MG/ML
INJECTION, EMULSION INTRAVENOUS CONTINUOUS PRN
Status: DISCONTINUED | OUTPATIENT
Start: 2024-09-06 | End: 2024-09-06

## 2024-09-06 RX ORDER — SODIUM CHLORIDE, SODIUM LACTATE, POTASSIUM CHLORIDE, CALCIUM CHLORIDE 600; 310; 30; 20 MG/100ML; MG/100ML; MG/100ML; MG/100ML
20 INJECTION, SOLUTION INTRAVENOUS CONTINUOUS
Status: DISCONTINUED | OUTPATIENT
Start: 2024-09-06 | End: 2024-09-08

## 2024-09-06 RX ORDER — LIDOCAINE 560 MG/1
1 PATCH PERCUTANEOUS; TOPICAL; TRANSDERMAL DAILY
Status: DISCONTINUED | OUTPATIENT
Start: 2024-09-06 | End: 2024-09-08 | Stop reason: HOSPADM

## 2024-09-06 RX ORDER — LIDOCAINE HYDROCHLORIDE 20 MG/ML
INJECTION, SOLUTION EPIDURAL; INFILTRATION; INTRACAUDAL; PERINEURAL AS NEEDED
Status: DISCONTINUED | OUTPATIENT
Start: 2024-09-06 | End: 2024-09-06

## 2024-09-06 RX ADMIN — PANTOPRAZOLE SODIUM 40 MG: 40 INJECTION, POWDER, FOR SOLUTION INTRAVENOUS at 11:54

## 2024-09-06 RX ADMIN — PANTOPRAZOLE SODIUM 40 MG: 40 INJECTION, POWDER, FOR SOLUTION INTRAVENOUS at 20:17

## 2024-09-06 RX ADMIN — LIDOCAINE 4% 1 PATCH: 40 PATCH TOPICAL at 16:00

## 2024-09-06 RX ADMIN — ACETAMINOPHEN 650 MG: 325 TABLET ORAL at 20:16

## 2024-09-06 RX ADMIN — AMIODARONE HYDROCHLORIDE 200 MG: 200 TABLET ORAL at 11:54

## 2024-09-06 RX ADMIN — ATORVASTATIN CALCIUM 40 MG: 40 TABLET, FILM COATED ORAL at 11:54

## 2024-09-06 RX ADMIN — METHIMAZOLE 5 MG: 5 TABLET ORAL at 11:54

## 2024-09-06 RX ADMIN — SODIUM CHLORIDE, POTASSIUM CHLORIDE, SODIUM LACTATE AND CALCIUM CHLORIDE 20 ML/HR: 600; 310; 30; 20 INJECTION, SOLUTION INTRAVENOUS at 08:56

## 2024-09-06 RX ADMIN — ATENOLOL 50 MG: 50 TABLET ORAL at 11:54

## 2024-09-06 RX ADMIN — FERROUS SULFATE TAB 325 MG (65 MG ELEMENTAL FE) 325 MG: 325 (65 FE) TAB at 11:54

## 2024-09-06 SDOH — HEALTH STABILITY: MENTAL HEALTH: CURRENT SMOKER: 0

## 2024-09-06 ASSESSMENT — PAIN SCALES - GENERAL
PAINLEVEL_OUTOF10: 0 - NO PAIN
PAINLEVEL_OUTOF10: 0 - NO PAIN
PAINLEVEL_OUTOF10: 7
PAINLEVEL_OUTOF10: 0 - NO PAIN

## 2024-09-06 ASSESSMENT — PAIN - FUNCTIONAL ASSESSMENT
PAIN_FUNCTIONAL_ASSESSMENT: 0-10

## 2024-09-06 ASSESSMENT — COGNITIVE AND FUNCTIONAL STATUS - GENERAL
DAILY ACTIVITIY SCORE: 20
HELP NEEDED FOR BATHING: A LITTLE
STANDING UP FROM CHAIR USING ARMS: A LITTLE
DRESSING REGULAR LOWER BODY CLOTHING: A LITTLE
HELP NEEDED FOR BATHING: A LITTLE
CLIMB 3 TO 5 STEPS WITH RAILING: A LITTLE
CLIMB 3 TO 5 STEPS WITH RAILING: A LITTLE
TOILETING: A LITTLE
MOVING TO AND FROM BED TO CHAIR: A LITTLE
WALKING IN HOSPITAL ROOM: A LITTLE
TOILETING: A LITTLE
MOVING TO AND FROM BED TO CHAIR: A LITTLE
DRESSING REGULAR LOWER BODY CLOTHING: A LITTLE
STANDING UP FROM CHAIR USING ARMS: A LITTLE
DRESSING REGULAR UPPER BODY CLOTHING: A LITTLE
DRESSING REGULAR UPPER BODY CLOTHING: A LITTLE
MOBILITY SCORE: 20
MOBILITY SCORE: 20
DAILY ACTIVITIY SCORE: 20
WALKING IN HOSPITAL ROOM: A LITTLE

## 2024-09-06 ASSESSMENT — PAIN DESCRIPTION - LOCATION: LOCATION: LEG

## 2024-09-06 ASSESSMENT — PAIN DESCRIPTION - ORIENTATION: ORIENTATION: RIGHT;LEFT

## 2024-09-06 NOTE — ANESTHESIA POSTPROCEDURE EVALUATION
Patient: Dianne Clay    Procedure Summary       Date: 09/06/24 Room / Location: [facility]    Anesthesia Start: 0958 Anesthesia Stop: 1054    Procedure: COLONOSCOPY Diagnosis: GI bleed    Scheduled Providers:  Responsible Provider: Bella Valentine MD    Anesthesia Type: MAC ASA Status: 3            Anesthesia Type: MAC    Vitals Value Taken Time   /57 09/06/24 1109   Temp 36.6 °C (97.9 °F) 09/06/24 1052   Pulse 82 09/06/24 1109   Resp 14 09/06/24 1107   SpO2 100 % 09/06/24 1109   Vitals shown include unfiled device data.    Anesthesia Post Evaluation    Patient participation: complete - patient participated  Level of consciousness: awake and alert  Pain management: adequate  Airway patency: patent  Cardiovascular status: acceptable  Respiratory status: acceptable  Hydration status: acceptable  Postoperative Nausea and Vomiting: none    No notable events documented.

## 2024-09-06 NOTE — CARE PLAN
The patient's goals for the shift include      The clinical goals for the shift include safety maintained    Problem: Fall/Injury  Goal: Not fall by end of shift  Outcome: Progressing  Goal: Be free from injury by end of the shift  Outcome: Progressing  Goal: Verbalize understanding of personal risk factors for fall in the hospital  Outcome: Progressing  Goal: Verbalize understanding of risk factor reduction measures to prevent injury from fall in the home  Outcome: Progressing  Goal: Use assistive devices by end of the shift  Outcome: Progressing  Goal: Pace activities to prevent fatigue by end of the shift  Outcome: Progressing

## 2024-09-06 NOTE — CARE PLAN
Problem: Fall/Injury  Goal: Not fall by end of shift  Outcome: Progressing  Goal: Be free from injury by end of the shift  Outcome: Progressing  Goal: Verbalize understanding of personal risk factors for fall in the hospital  Outcome: Progressing  Goal: Verbalize understanding of risk factor reduction measures to prevent injury from fall in the home  Outcome: Progressing  Goal: Use assistive devices by end of the shift  Outcome: Progressing  Goal: Pace activities to prevent fatigue by end of the shift  Outcome: Progressing      No significant past surgical history

## 2024-09-06 NOTE — ANESTHESIA PREPROCEDURE EVALUATION
Patient: Dianne Clay    Procedure Information       Date/Time: 24 0700    Procedure: COLONOSCOPY    Location: [facility]          Vitals:    24 0854   BP: 127/61   Pulse: 78   Resp: 14   Temp: 36.4 °C (97.5 °F)   SpO2: 98%       Past Surgical History:   Procedure Laterality Date   • CATARACT EXTRACTION  2014    Cataract Surgery   •  SECTION, CLASSIC  2014     Section   • COLONOSCOPY  2014    Colonoscopy (Fiberoptic)   • CORONARY ANGIOPLASTY WITH STENT PLACEMENT  2014    Cath Stent Placement   • FLEXIBLE SIGMOIDOSCOPY     • OTHER SURGICAL HISTORY  2023    Cholecystectomy laparoscopic   • UPPER GASTROINTESTINAL ENDOSCOPY     • UPPER GASTROINTESTINAL ENDOSCOPY       Past Medical History:   Diagnosis Date   • Adrenal nodule (Multi)    • Allergic contact dermatitis, unspecified cause 2015    Allergic dermatitis   • Anemia    • Arthritis    • Atrial fibrillation, chronic (Multi)    • Contusion of unspecified knee, initial encounter 2018    Knee contusion   • Effusion, unspecified knee 2019    Knee swelling   • Encounter for follow-up examination after completed treatment for conditions other than malignant neoplasm 2022    Hospital discharge follow-up   • GERD (gastroesophageal reflux disease)    • Hypertension    • Hyperthyroidism    • Noninfective gastroenteritis and colitis, unspecified 2022    Enteritis   • Noninfective gastroenteritis and colitis, unspecified     Colitis   • Other conditions influencing health status 2021    History of cough   • Other conditions influencing health status     Ulcer   • PAD (peripheral artery disease) (CMS-[facility])    • Pain in left knee 2014    Knee pain, left anterior   • Personal history of other diseases of the musculoskeletal system and connective tissue     Personal history of arthritis   • Personal history of other specified conditions 10/21/2016    History of epistaxis    • Personal history of other specified conditions 03/17/2015    History of dizziness   • Personal history of pneumonia (recurrent) 04/01/2022    History of community acquired pneumonia   • Rectal bleeding    • Rectal ulcer        Current Facility-Administered Medications:   •  acetaminophen (Tylenol) tablet 650 mg, 650 mg, oral, q4h PRN **OR** acetaminophen (Tylenol) oral liquid 650 mg, 650 mg, oral, q4h PRN **OR** [DISCONTINUED] acetaminophen (Tylenol) suppository 650 mg, 650 mg, rectal, q4h PRN, JEREMY Chatterjee-CNP  •  amiodarone (Pacerone) tablet 200 mg, 200 mg, oral, Daily, JEREMY Chatterjee-CNP, 200 mg at 09/05/24 0826  •  [Held by provider] apixaban (Eliquis) tablet 2.5 mg, 2.5 mg, oral, BID, Nazario Young APRKEL-CNP  •  [Held by provider] aspirin EC tablet 81 mg, 81 mg, oral, Daily, Nazario Young APRN-CNP  •  atenolol (Tenormin) tablet 50 mg, 50 mg, oral, Daily, Nazario Young APRN-CNP, 50 mg at 09/05/24 0826  •  atorvastatin (Lipitor) tablet 40 mg, 40 mg, oral, Daily, Nazario Young APRN-CNP, 40 mg at 09/05/24 2030  •  ferrous sulfate (325 mg ferrous sulfate) tablet 325 mg, 1 tablet, oral, Daily, JEREMY Chatterjee-CNP, 325 mg at 09/05/24 0826  •  lactated Ringer's infusion, 20 mL/hr, intravenous, Continuous, Bella Valentine MD, Last Rate: 20 mL/hr at 09/06/24 0856, 20 mL/hr at 09/06/24 0856  •  methIMAzole (Tapazole) tablet 5 mg, 5 mg, oral, Daily, Nazario Young APRN-CNP, 5 mg at 09/05/24 0826  •  pantoprazole (ProtoNix) injection 40 mg, 40 mg, intravenous, BID, Nazario Young APRN-CNP, 40 mg at 09/05/24 2030  Prior to Admission medications    Medication Sig Start Date End Date Taking? Authorizing Provider   amiodarone (Pacerone) 200 mg tablet Take 1 tablet by mouth once daily with a meal. 8/21/24  Yes Jyoti Carbajal,    apixaban (Eliquis) 2.5 mg tablet Take 1 tablet (2.5 mg) by mouth 2 times a day. 3/12/24  Yes Jyoti Carbajal,    atenolol (Tenormin) 50 mg tablet TAKE ONE (1)  TABLET EVERY DAY 2/6/24  Yes Jyoti Carbajal, DO   atorvastatin (Lipitor) 40 mg tablet TAKE 1 TABLET BY MOUTH DAILY 10/26/23  Yes Jyoti Carbajal, DO   diclofenac sodium (Voltaren Arthritis Pain) 1 % gel Apply 4.5 inches (4 g) topically 4 times a day as needed (Arthritic pain).   Yes Historical Provider, MD   FeroSuL tablet TAKE 1 TABLET BY MOUTH DAILY WITH FOOD 1/10/24  Yes Jyoti Carbajal, DO   methIMAzole (Tapazole) 5 mg tablet TAKE 1 TABLET BY MOUTH ONCE DAILY. 3/22/24  Yes Jyoti Carbajal, DO   pantoprazole (ProtoNix) 40 mg EC tablet TAKE 1 TABLET BY MOUTH ONCE DAILY 7/23/24  Yes Jyoti Carbajal, DO   psyllium (Metamucil) 3.4 gram packet Take 1 packet by mouth 2 times a day. 10/2/19 9/5/24 Yes Historical Provider, MD   aspirin 81 mg EC tablet Take 1 tablet (81 mg) by mouth once daily.  9/5/24  Historical Provider, MD     Allergies   Allergen Reactions   • Bee Venom Protein (Honey Bee) Swelling     Social History     Tobacco Use   • Smoking status: Never     Passive exposure: Never   • Smokeless tobacco: Never   Substance Use Topics   • Alcohol use: Not Currently     Comment: RARE         Chemistry    Lab Results   Component Value Date/Time     09/05/2024 0658    K 3.9 09/05/2024 0658     (H) 09/05/2024 0658    CO2 20 (L) 09/05/2024 0658    BUN 22 09/05/2024 0658    CREATININE 0.83 09/05/2024 0658    Lab Results   Component Value Date/Time    CALCIUM 7.2 (L) 09/05/2024 0658    ALKPHOS 73 09/04/2024 1345    AST 12 09/04/2024 1345    ALT 5 (L) 09/04/2024 1345    BILITOT 1.1 09/04/2024 1345          Lab Results   Component Value Date/Time    WBC 10.4 09/06/2024 0723    HGB 7.2 (L) 09/06/2024 0723    HCT 22.9 (L) 09/06/2024 0723     09/06/2024 0723     Lab Results   Component Value Date/Time    PROTIME 12.8 09/04/2024 1345    INR 1.1 09/04/2024 1345     No results found for this or any previous visit (from the past 4464 hour(s)).  No results found for this or any previous visit from  the past 1095 days.        Relevant Problems   Cardiac   (+) Atrial fibrillation (Multi)   (+) Hypertension   (+) PAD (peripheral artery disease) (CMS-HCC)      GI   (+) GERD (gastroesophageal reflux disease)   (+) GI bleed   (+) Rectal bleed      Endocrine   (+) Hyperthyroidism      Hematology   (+) Anemia      Musculoskeletal   (+) OA (osteoarthritis) of knee   (+) Pseudogout of knee       Clinical information reviewed:   Tobacco  Allergies  Meds   Med Hx  Surg Hx   Fam Hx  Soc Hx        NPO Detail:  NPO/Void Status  Carbohydrate Drink Given Prior to Surgery? : N  Date of Last Liquid: 09/06/24  Time of Last Liquid: 0000  Date of Last Solid: 09/04/24  Time of Last Solid: 1000  Last Intake Type: Clear fluids (water)  Time of Last Void: 0800         Physical Exam    Airway  Mallampati: IV  TM distance: >3 FB  Neck ROM: full     Cardiovascular   Rhythm: regular  Rate: normal     Dental    Pulmonary   Breath sounds clear to auscultation     Abdominal      Other findings: Poor dentition, multiple missing, chipped teeth.       Anesthesia Plan    History of general anesthesia?: yes  History of complications of general anesthesia?: no    ASA 3     MAC     The patient is not a current smoker.    intravenous induction   Anesthetic plan and risks discussed with patient.    Plan discussed with CAA.

## 2024-09-06 NOTE — POST-PROCEDURE NOTE
Colonoscopy done this morning for evaluation of hematochezia.  The colonoscopy showed extensive blood and clots throughout the colon, along with severe pan-diverticulosis.  However, despite extensive lavage and suctioning, and re-intubation of multiple segments of the colon for over 30 minutes, no active bleeding was seen, and no culprit bleeding lesion could be identified.  There was no evidence of active bleeding at the end of the colonoscopy.  Given her extensive diverticulosis, this is/was likely the source of her bleeding.    Recommendations:  - if bleeding continues, recommend repeat CTA to attempt to localize the site of bleeding  - continue to hold Eliquis  - OK for clear liquid diet today, in case further procedures are necessary  - recommend she follow through with [facility] evaluation as an outpatient, given recurrent GI bleeding

## 2024-09-07 LAB
ANION GAP SERPL CALC-SCNC: 12 MMOL/L (ref 10–20)
BLOOD EXPIRATION DATE: NORMAL
BUN SERPL-MCNC: 12 MG/DL (ref 6–23)
CALCIUM SERPL-MCNC: 7.4 MG/DL (ref 8.6–10.3)
CHLORIDE SERPL-SCNC: 104 MMOL/L (ref 98–107)
CO2 SERPL-SCNC: 23 MMOL/L (ref 21–32)
CREAT SERPL-MCNC: 0.8 MG/DL (ref 0.5–1.05)
DISPENSE STATUS: NORMAL
EGFRCR SERPLBLD CKD-EPI 2021: 73 ML/MIN/1.73M*2
ERYTHROCYTE [DISTWIDTH] IN BLOOD BY AUTOMATED COUNT: 17.7 % (ref 11.5–14.5)
GLUCOSE SERPL-MCNC: 99 MG/DL (ref 74–99)
HCT VFR BLD AUTO: 20.5 % (ref 36–46)
HGB BLD-MCNC: 6.8 G/DL (ref 12–16)
MCH RBC QN AUTO: 29.8 PG (ref 26–34)
MCHC RBC AUTO-ENTMCNC: 33.2 G/DL (ref 32–36)
MCV RBC AUTO: 90 FL (ref 80–100)
NRBC BLD-RTO: 0.2 /100 WBCS (ref 0–0)
PLATELET # BLD AUTO: 225 X10*3/UL (ref 150–450)
POTASSIUM SERPL-SCNC: 3.4 MMOL/L (ref 3.5–5.3)
PRODUCT BLOOD TYPE: 6200
PRODUCT CODE: NORMAL
RBC # BLD AUTO: 2.28 X10*6/UL (ref 4–5.2)
SODIUM SERPL-SCNC: 136 MMOL/L (ref 136–145)
UNIT ABO: NORMAL
UNIT NUMBER: NORMAL
UNIT RH: NORMAL
UNIT VOLUME: 350
WBC # BLD AUTO: 10.9 X10*3/UL (ref 4.4–11.3)
XM INTEP: NORMAL

## 2024-09-07 PROCEDURE — 82374 ASSAY BLOOD CARBON DIOXIDE: CPT | Performed by: INTERNAL MEDICINE

## 2024-09-07 PROCEDURE — 36415 COLL VENOUS BLD VENIPUNCTURE: CPT | Performed by: INTERNAL MEDICINE

## 2024-09-07 PROCEDURE — 99232 SBSQ HOSP IP/OBS MODERATE 35: CPT | Performed by: INTERNAL MEDICINE

## 2024-09-07 PROCEDURE — P9016 RBC LEUKOCYTES REDUCED: HCPCS

## 2024-09-07 PROCEDURE — 1200000002 HC GENERAL ROOM WITH TELEMETRY DAILY

## 2024-09-07 PROCEDURE — 2500000005 HC RX 250 GENERAL PHARMACY W/O HCPCS: Performed by: INTERNAL MEDICINE

## 2024-09-07 PROCEDURE — 2500000004 HC RX 250 GENERAL PHARMACY W/ HCPCS (ALT 636 FOR OP/ED): Performed by: NURSE PRACTITIONER

## 2024-09-07 PROCEDURE — 36430 TRANSFUSION BLD/BLD COMPNT: CPT

## 2024-09-07 PROCEDURE — 2500000001 HC RX 250 WO HCPCS SELF ADMINISTERED DRUGS (ALT 637 FOR MEDICARE OP): Performed by: NURSE PRACTITIONER

## 2024-09-07 PROCEDURE — 2500000004 HC RX 250 GENERAL PHARMACY W/ HCPCS (ALT 636 FOR OP/ED): Performed by: INTERNAL MEDICINE

## 2024-09-07 PROCEDURE — 2500000001 HC RX 250 WO HCPCS SELF ADMINISTERED DRUGS (ALT 637 FOR MEDICARE OP): Performed by: INTERNAL MEDICINE

## 2024-09-07 PROCEDURE — 85027 COMPLETE CBC AUTOMATED: CPT | Performed by: NURSE PRACTITIONER

## 2024-09-07 PROCEDURE — 2500000002 HC RX 250 W HCPCS SELF ADMINISTERED DRUGS (ALT 637 FOR MEDICARE OP, ALT 636 FOR OP/ED): Performed by: NURSE PRACTITIONER

## 2024-09-07 RX ORDER — POTASSIUM CHLORIDE 1.5 G/1.58G
20 POWDER, FOR SOLUTION ORAL ONCE
Status: COMPLETED | OUTPATIENT
Start: 2024-09-07 | End: 2024-09-07

## 2024-09-07 RX ADMIN — METHIMAZOLE 5 MG: 5 TABLET ORAL at 08:25

## 2024-09-07 RX ADMIN — SODIUM CHLORIDE 250 ML: 9 INJECTION, SOLUTION INTRAVENOUS at 16:46

## 2024-09-07 RX ADMIN — POTASSIUM CHLORIDE 20 MEQ: 1.5 POWDER, FOR SOLUTION ORAL at 09:43

## 2024-09-07 RX ADMIN — PANTOPRAZOLE SODIUM 40 MG: 40 INJECTION, POWDER, FOR SOLUTION INTRAVENOUS at 08:25

## 2024-09-07 RX ADMIN — PANTOPRAZOLE SODIUM 40 MG: 40 INJECTION, POWDER, FOR SOLUTION INTRAVENOUS at 20:32

## 2024-09-07 RX ADMIN — ACETAMINOPHEN 650 MG: 325 TABLET ORAL at 22:29

## 2024-09-07 RX ADMIN — AMIODARONE HYDROCHLORIDE 200 MG: 200 TABLET ORAL at 08:26

## 2024-09-07 RX ADMIN — ATORVASTATIN CALCIUM 40 MG: 40 TABLET, FILM COATED ORAL at 08:26

## 2024-09-07 RX ADMIN — ACETAMINOPHEN 650 MG: 325 TABLET ORAL at 06:24

## 2024-09-07 RX ADMIN — FERROUS SULFATE TAB 325 MG (65 MG ELEMENTAL FE) 325 MG: 325 (65 FE) TAB at 08:25

## 2024-09-07 RX ADMIN — SODIUM CHLORIDE 250 ML: 9 INJECTION, SOLUTION INTRAVENOUS at 14:42

## 2024-09-07 ASSESSMENT — COGNITIVE AND FUNCTIONAL STATUS - GENERAL
DAILY ACTIVITIY SCORE: 19
PERSONAL GROOMING: A LITTLE
HELP NEEDED FOR BATHING: A LITTLE
STANDING UP FROM CHAIR USING ARMS: A LITTLE
MOBILITY SCORE: 20
DRESSING REGULAR LOWER BODY CLOTHING: A LITTLE
TURNING FROM BACK TO SIDE WHILE IN FLAT BAD: A LITTLE
CLIMB 3 TO 5 STEPS WITH RAILING: A LITTLE
TOILETING: A LITTLE
WALKING IN HOSPITAL ROOM: A LITTLE
HELP NEEDED FOR BATHING: A LITTLE
DRESSING REGULAR LOWER BODY CLOTHING: A LITTLE
STANDING UP FROM CHAIR USING ARMS: A LITTLE
DAILY ACTIVITIY SCORE: 20
CLIMB 3 TO 5 STEPS WITH RAILING: A LITTLE
TOILETING: A LITTLE
DRESSING REGULAR UPPER BODY CLOTHING: A LITTLE
MOBILITY SCORE: 18
MOVING TO AND FROM BED TO CHAIR: A LITTLE
WALKING IN HOSPITAL ROOM: A LITTLE
DRESSING REGULAR UPPER BODY CLOTHING: A LITTLE
MOVING TO AND FROM BED TO CHAIR: A LITTLE
MOVING FROM LYING ON BACK TO SITTING ON SIDE OF FLAT BED WITH BEDRAILS: A LITTLE

## 2024-09-07 ASSESSMENT — PAIN SCALES - GENERAL
PAINLEVEL_OUTOF10: 0 - NO PAIN
PAINLEVEL_OUTOF10: 3
PAINLEVEL_OUTOF10: 8

## 2024-09-07 ASSESSMENT — PAIN - FUNCTIONAL ASSESSMENT
PAIN_FUNCTIONAL_ASSESSMENT: 0-10

## 2024-09-07 ASSESSMENT — PAIN DESCRIPTION - DESCRIPTORS: DESCRIPTORS: ACHING

## 2024-09-07 ASSESSMENT — PAIN DESCRIPTION - LOCATION: LOCATION: LEG

## 2024-09-07 ASSESSMENT — PAIN DESCRIPTION - ORIENTATION: ORIENTATION: RIGHT;LEFT

## 2024-09-07 NOTE — PROGRESS NOTES
Dianne Clay is a 84 y.o. female on day 3 of admission presenting with GI bleed.      Subjective   Patient was seen and examined bedside this morning, stated that she slept well, tolerated colonoscopy yesterday, and denied any bloody bowel movement at this time.       Objective     Last Recorded Vitals  BP (!) 93/46 (BP Location: Left arm, Patient Position: Sitting)   Pulse 76   Temp 36.9 °C (98.4 °F) (Oral)   Resp 17   Wt 52.2 kg (115 lb 1.3 oz)   SpO2 100%   Intake/Output last 3 Shifts:    Intake/Output Summary (Last 24 hours) at 9/7/2024 1434  Last data filed at 9/7/2024 1243  Gross per 24 hour   Intake 1076 ml   Output --   Net 1076 ml       Admission Weight  Weight: 52.2 kg (115 lb) (09/04/24 1309)    Daily Weight  09/06/24 : 52.2 kg (115 lb 1.3 oz)    Image Results  Colonoscopy Diagnostic  Table formatting from the original result was not included.  Impression  Red blood and clots throughout the entire colon.  Extensive diverticulosis in the entire colon.  Despite extensive lavage and suctioning, and re-intubation of multiple   segments of the colon, no active bleeding was seen, and no culprit lesion   could be identified.  There was no evidence of active bleeding at the end   of the colonoscopy.    Findings  Significant amount of pancolonic fresh blood and blood clotting  Multiple small and large, extensive pancolonic severe diverticula   containing blood. Despite extensive lavage and suctioning, and   re-intubation of multiple segments of the colon, no underlying culprit   bleeding lesion was seen.  After extensive lavage was performed, there was   no evidence of active bleeding.       Recommendation  If bleeding recurs/continues, recommend repeat CTA to attempt to localize   the source of bleeding  Continue to hold Eliquis  OK for clear liquid diet today       Indication  GI bleed    Staff  Staff Role   Nicolas Calixto MD Proceduralist     Medications  See Anesthesia Record.     Preprocedure  A  history and physical has been performed, and patient medication   allergies have been reviewed. The patient's tolerance of previous   anesthesia has been reviewed. The risks and benefits of the procedure and   the sedation options and risks were discussed with the patient. All   questions were answered and informed consent obtained.    Details of the Procedure  The patient underwent monitored anesthesia care, which was administered by   an anesthesia professional. The patient's blood pressure, ECG, ETCO2,   heart rate, level of consciousness, oxygen and respirations were monitored   throughout the procedure. A digital rectal exam was performed. The scope   was introduced through the anus and advanced to the cecum. Retroflexion   was performed in the rectum. Bowel prep was adequate. The patient   experienced no blood loss. The procedure was moderately difficult due to   loops in the digestive tract, tortuous colon and blood throughout the   colon. In response to procedure difficulty, counter pressure was applied,   loop reduction was employed and stiffening wire was used. The patient   tolerated the procedure well. There were no apparent adverse events.     Events  Procedure Events   Event Event Time   ENDO SCOPE IN TIME 9/6/2024 10:09 AM   ENDO CECUM REACHED 9/6/2024 10:21 AM   ENDO SCOPE OUT TIME 9/6/2024 10:49 AM     Specimens  No specimens collected    Procedure Location  [facility] [address]  [address] [facility] [address] [phone]    Referring Provider  Florinda Navarro, APRN-CNP    Procedure Provider  MD Florinda Katz      Physical Exam  Constitutional: Pleasant mildly emaciated elderly female, alert active, cooperative not in acute distress  Eyes: PERRLA, clear sclera  ENMT: Moist mucosal membranes, no exudate  Head / Neck: Atraumatic, normocephalic, supple neck, JVP not visualized  Lungs: Patent airways, CTABL  Heart: RRR, S1S2, no  murmurs appreciated, palpable pulses in all extremities  GI: Soft, slightly tender to palpation, ND, bowel sounds present in all quadrants  MSK: Moves all extremities freely, no restriction  of ROM, no joint edema  Extremities: Intact x 4, no peripheral edema  : No Pendleton catheter inserted  Breast: Deferred  Neurological: AAO x 3 to person, place and date, facial muscles symmetrical, sensation intact, strength 4/4, no acute focal neurological deficits appreciated  Psychological: Appropriate mood and behavior  Relevant Results           Scheduled medications  amiodarone, 200 mg, oral, Daily  [Held by provider] apixaban, 2.5 mg, oral, BID  [Held by provider] aspirin, 81 mg, oral, Daily  atenolol, 50 mg, oral, Daily  atorvastatin, 40 mg, oral, Daily  ferrous sulfate (325 mg ferrous sulfate), 1 tablet, oral, Daily  lidocaine, 1 patch, transdermal, Daily  methIMAzole, 5 mg, oral, Daily  pantoprazole, 40 mg, intravenous, BID  sodium chloride, 250 mL, intravenous, Once      Continuous medications  lactated Ringer's, 20 mL/hr, Last Rate: Stopped (09/06/24 1049)      PRN medications  PRN medications: acetaminophen **OR** acetaminophen **OR** [DISCONTINUED] acetaminophen      Assessment/Plan      84 y.o. female with a past medical history dyslipidemia, hypertension, bleeding ulcer, A-fib on Eliquis, hyperthyroidism presenting due to bloody bowel movements, patient came as a transfer from [facility].  Patient originally presented to outside [facility] hospital yesterday and left AMA as she did not want to be transferred to [facility] and went to [facility] yesterday.  Patient with multiple dark bloody bowel movements that started around 7 PM, 2 days ago       Acute anemia secondary to GI losses  -Presented with H&H of 8.4/26.4, H&H initially stable, downward trended to 6.8 in a.m. lab  -1 unit packed RBC ordered for transfusion  -Hold anticoagulation (aspirin 81 mg daily, Eliquis 2.5 mg twice daily)  -Initial  CT at outside hospital ([facility]) inconclusive, and showing active bleeding and slight  -IR consulted for possible embolization: No intervention at this time outside of bleeding and identified  -Repeat stat CT of abdomen pelvis with IV contrast shows no bleeding but presented with abnormal finding  -Continue H&H monitoring, last 2 labs showed hemoglobin 7.3 appears to stabilize  -No reported bright red blood per rectum after episode on the floor in late a.m.  -GI consulted: Colonoscopy today, showed extensive blood and clots throughout the colon along with severe pan diverticulosis, despite extensive lavage and suctioning and reintubation of multiple segment of the colon for over 30 minutes no active bleeding was seen.  Etiology likely diverticular bleed given extensive degree of diverticulosis.  -GI recommend to follow-up with [facility] evaluation as outpatient given recurrent GI bleed  -Repeat CTA if bleeding recurred to locate site of bleeding, advised repeat CTA if bleeding recur     Hypertension stable  -Continue home meds: Atenolol 50 mg daily     Hyperlipidemia  -Atorvastatin 40 mg daily     Atrial fibrillation  -Amiodarone 200 mg daily  -On Eliquis, currently on hold, recommend restarting 3 to 5 days after bleeding stopped  -Patient to follow-up with cardiology at [facility] in light of recurrent bleeding for anticoagulation review     Diet  -Advance to soft     DVT prophylaxis  -Omission of heparin anticoagulation as patient on Eliquis but on hold for acute GI bleed     Disposition: Presented with acute GI bleed, bleeding site not localized, GI on board for colonoscopy, discharge pending clinical improvement.                    Dirk Bella DO

## 2024-09-07 NOTE — PROGRESS NOTES
Dianne Clay is a 84 y.o. female on day 2 of admission presenting with GI bleed.      Subjective   Patient was seen and examined bedside this morning, stated that she did not have any recurrent bright red blood per rectum, and completed her colonoscopy prep.  She admitted to chronic arthritic pain, denied having any symptoms or review of systems.  Since cannot take Voltaren gel, offered lidocaine patches for now.       Objective     Last Recorded Vitals  /61   Pulse 90   Temp 36.3 °C (97.4 °F) (Temporal)   Resp 16   Wt 52.2 kg (115 lb 1.3 oz)   SpO2 99%   Intake/Output last 3 Shifts:    Intake/Output Summary (Last 24 hours) at 9/6/2024 2032  Last data filed at 9/6/2024 1500  Gross per 24 hour   Intake 880 ml   Output --   Net 880 ml       Admission Weight  Weight: 52.2 kg (115 lb) (09/04/24 1309)    Daily Weight  09/06/24 : 52.2 kg (115 lb 1.3 oz)    Image Results  Colonoscopy Diagnostic  Table formatting from the original result was not included.  Impression  Red blood and clots throughout the entire colon.  Extensive diverticulosis in the entire colon.  Despite extensive lavage and suctioning, and re-intubation of multiple   segments of the colon, no active bleeding was seen, and no culprit lesion   could be identified.  There was no evidence of active bleeding at the end   of the colonoscopy.    Findings  Significant amount of pancolonic fresh blood and blood clotting  Multiple small and large, extensive pancolonic severe diverticula   containing blood. Despite extensive lavage and suctioning, and   re-intubation of multiple segments of the colon, no underlying culprit   bleeding lesion was seen.  After extensive lavage was performed, there was   no evidence of active bleeding.       Recommendation  If bleeding recurs/continues, recommend repeat CTA to attempt to localize   the source of bleeding  Continue to hold Eliquis  OK for clear liquid diet today       Indication  GI bleed    Staff  Staff Role    Nicolas Calixto MD Proceduralist     Medications  See Anesthesia Record.     Preprocedure  A history and physical has been performed, and patient medication   allergies have been reviewed. The patient's tolerance of previous   anesthesia has been reviewed. The risks and benefits of the procedure and   the sedation options and risks were discussed with the patient. All   questions were answered and informed consent obtained.    Details of the Procedure  The patient underwent monitored anesthesia care, which was administered by   an anesthesia professional. The patient's blood pressure, ECG, ETCO2,   heart rate, level of consciousness, oxygen and respirations were monitored   throughout the procedure. A digital rectal exam was performed. The scope   was introduced through the anus and advanced to the cecum. Retroflexion   was performed in the rectum. Bowel prep was adequate. The patient   experienced no blood loss. The procedure was moderately difficult due to   loops in the digestive tract, tortuous colon and blood throughout the   colon. In response to procedure difficulty, counter pressure was applied,   loop reduction was employed and stiffening wire was used. The patient   tolerated the procedure well. There were no apparent adverse events.     Events  Procedure Events   Event Event Time   ENDO SCOPE IN TIME 9/6/2024 10:09 AM   ENDO CECUM REACHED 9/6/2024 10:21 AM   ENDO SCOPE OUT TIME 9/6/2024 10:49 AM     Specimens  No specimens collected    Procedure Location  [facility] [address]  [address] [facility] [id number] [phone]    Referring Provider  Florinda Navarro, APRN-CNP    Procedure Provider  MD Florinda Katz      Physical Exam  Constitutional: Pleasant mildly emaciated elderly female, alert active, cooperative not in acute distress  Eyes: PERRLA, clear sclera  ENMT: Moist mucosal membranes, no exudate  Head / Neck: Atraumatic,  normocephalic, supple neck, JVP not visualized  Lungs: Patent airways, CTABL  Heart: RRR, S1S2, no murmurs appreciated, palpable pulses in all extremities  GI: Soft, slightly tender to palpation, ND, bowel sounds present in all quadrants  MSK: Moves all extremities freely, no restriction  of ROM, no joint edema  Extremities: Intact x 4, no peripheral edema  : No Pendleton catheter inserted  Breast: Deferred  Neurological: AAO x 3 to person, place and date, facial muscles symmetrical, sensation intact, strength 4/4, no acute focal neurological deficits appreciated  Psychological: Appropriate mood and behavior  Relevant Results           Scheduled medications  amiodarone, 200 mg, oral, Daily  [Held by provider] apixaban, 2.5 mg, oral, BID  [Held by provider] aspirin, 81 mg, oral, Daily  atenolol, 50 mg, oral, Daily  atorvastatin, 40 mg, oral, Daily  ferrous sulfate (325 mg ferrous sulfate), 1 tablet, oral, Daily  lidocaine, 1 patch, transdermal, Daily  methIMAzole, 5 mg, oral, Daily  pantoprazole, 40 mg, intravenous, BID      Continuous medications  lactated Ringer's, 20 mL/hr, Last Rate: Stopped (09/06/24 1049)      PRN medications  PRN medications: acetaminophen **OR** acetaminophen **OR** [DISCONTINUED] acetaminophen      Assessment/Plan      84 y.o. female with a past medical history dyslipidemia, hypertension, bleeding ulcer, A-fib on Eliquis, hyperthyroidism presenting due to bloody bowel movements, patient came as a transfer from [facility].  Patient originally presented to outside [facility] hospital yesterday and left AMA as she did not want to be transferred to [facility] and went to [facility] yesterday.  Patient with multiple dark bloody bowel movements that started around 7 PM, 2 days ago         Acute anemia secondary to GI losses  -Presented with H&H of 8.4/26.4  -GI losses presenting as bright red blood per rectum  -Hold anticoagulation (aspirin 81 mg daily, Eliquis 2.5 mg twice  daily)  -Initial CT at outside hospital ([facility]) inconclusive, and showing active bleeding and slight  -IR consulted for possible embolization: No intervention at this time outside of bleeding and identified  -Repeat stat CT of abdomen pelvis with IV contrast shows no bleeding but presented with abnormal finding  -Continue H&H monitoring, last 2 labs showed hemoglobin 7.3 appears to stabilize  -No reported bright red blood per rectum after episode on the floor in late a.m.  -GI consulted: Colonoscopy today, showed extensive blood and clots throughout the colon along with severe pan diverticulosis, despite extensive lavage and suctioning and reintubation of multiple segment of the colon for over 30 minutes no active bleeding was seen.  Etiology likely diverticular bleed given extensive degree of diverticulosis.  -GI recommend to follow-up with [facility] evaluation as outpatient given recurrent GI bleed  -Repeat CTA if bleeding recurred to locate site of bleeding     Hypertension stable  -Continue home meds: Atenolol 50 mg daily     Hyperlipidemia  -Atorvastatin 40 mg daily     Atrial fibrillation  -Amiodarone 200 mg daily  -On Eliquis, currently on hold     Diet  -Clear liquid started, will advance as tolerated     DVT prophylaxis  -Omission of heparin anticoagulation as patient on Eliquis but on hold for acute GI bleed     Disposition: Presented with acute GI bleed, bleeding site not localized, GI on board for colonoscopy, discharge pending clinical improvement.               Dirk Bella DO

## 2024-09-07 NOTE — PROGRESS NOTES
"Dianne Clay is a 84 y.o. female on day 3 of admission presenting with GI bleed.    Subjective   No acute events.  States she has some bloody watery discharge after the colonoscopy yesterday, but no further bleeding like what brought her in to the hospital.  No bleeding overnight or this morning.    Objective     Physical Exam  Constitutional:       General: She is not in acute distress.  Eyes:      Extraocular Movements: Extraocular movements intact.   Abdominal:      General: Bowel sounds are normal. There is no distension.      Palpations: Abdomen is soft.      Tenderness: There is no abdominal tenderness. There is no guarding or rebound.   Skin:     General: Skin is warm and dry.   Neurological:      General: No focal deficit present.      Mental Status: She is alert.   Psychiatric:         Mood and Affect: Mood normal.         Behavior: Behavior normal.         Last Recorded Vitals  Blood pressure 124/63, pulse 83, temperature 36.3 °C (97.3 °F), temperature source Temporal, resp. rate 18, height 1.651 m (5' 5\"), weight 52.2 kg (115 lb 1.3 oz), SpO2 98%.  Intake/Output last 3 Shifts:  I/O last 3 completed shifts:  In: 1120 (21.5 mL/kg) [P.O.:720; I.V.:400 (7.7 mL/kg)]  Out: - (0 mL/kg)   Weight: 52.2 kg     Relevant Results    Scheduled medications  amiodarone, 200 mg, oral, Daily  [Held by provider] apixaban, 2.5 mg, oral, BID  [Held by provider] aspirin, 81 mg, oral, Daily  atenolol, 50 mg, oral, Daily  atorvastatin, 40 mg, oral, Daily  ferrous sulfate (325 mg ferrous sulfate), 1 tablet, oral, Daily  lidocaine, 1 patch, transdermal, Daily  methIMAzole, 5 mg, oral, Daily  pantoprazole, 40 mg, intravenous, BID      Continuous medications  lactated Ringer's, 20 mL/hr, Last Rate: Stopped (09/06/24 1049)      PRN medications  PRN medications: acetaminophen **OR** acetaminophen **OR** [DISCONTINUED] acetaminophen    Results for orders placed or performed during the hospital encounter of 09/04/24 (from the past 24 " hour(s))   Type And Screen   Result Value Ref Range    ABO TYPE A     Rh TYPE POS     ANTIBODY SCREEN NEG    CBC   Result Value Ref Range    WBC 10.9 4.4 - 11.3 x10*3/uL    nRBC 0.2 (H) 0.0 - 0.0 /100 WBCs    RBC 2.28 (L) 4.00 - 5.20 x10*6/uL    Hemoglobin 6.8 (L) 12.0 - 16.0 g/dL    Hematocrit 20.5 (L) 36.0 - 46.0 %    MCV 90 80 - 100 fL    MCH 29.8 26.0 - 34.0 pg    MCHC 33.2 32.0 - 36.0 g/dL    RDW 17.7 (H) 11.5 - 14.5 %    Platelets 225 150 - 450 x10*3/uL   Basic Metabolic Panel   Result Value Ref Range    Glucose 99 74 - 99 mg/dL    Sodium 136 136 - 145 mmol/L    Potassium 3.4 (L) 3.5 - 5.3 mmol/L    Chloride 104 98 - 107 mmol/L    Bicarbonate 23 21 - 32 mmol/L    Anion Gap 12 10 - 20 mmol/L    Urea Nitrogen 12 6 - 23 mg/dL    Creatinine 0.80 0.50 - 1.05 mg/dL    eGFR 73 >60 mL/min/1.73m*2    Calcium 7.4 (L) 8.6 - 10.3 mg/dL   Prepare RBC: 1 Units   Result Value Ref Range    PRODUCT CODE S2787T26     Unit Number [id number]     Unit ABO A     Unit RH POS     XM INTEP COMP     Dispense Status IS     Blood Expiration Date 9/20/2024 11:59:00 PM EDT     PRODUCT BLOOD TYPE 6200     UNIT VOLUME 350        Colonoscopy Diagnostic    Result Date: 9/6/2024  Impression Red blood and clots throughout the entire colon. Extensive diverticulosis in the entire colon. Despite extensive lavage and suctioning, and re-intubation of multiple segments of the colon, no active bleeding was seen, and no culprit lesion could be identified.  There was no evidence of active bleeding at the end of the colonoscopy. Findings Significant amount of pancolonic fresh blood and blood clotting Multiple small and large, extensive pancolonic severe diverticula containing blood. Despite extensive lavage and suctioning, and re-intubation of multiple segments of the colon, no underlying culprit bleeding lesion was seen.  After extensive lavage was performed, there was no evidence of active bleeding.  Recommendation If bleeding recurs/continues,  recommend repeat CTA to attempt to localize the source of bleeding Continue to hold Eliquis OK for clear liquid diet today  Indication GI bleed Staff Staff Role Nicolas Calixto MD Proceduralist Medications See Anesthesia Record. Preprocedure A history and physical has been performed, and patient medication allergies have been reviewed. The patient's tolerance of previous anesthesia has been reviewed. The risks and benefits of the procedure and the sedation options and risks were discussed with the patient. All questions were answered and informed consent obtained. Details of the Procedure The patient underwent monitored anesthesia care, which was administered by an anesthesia professional. The patient's blood pressure, ECG, ETCO2, heart rate, level of consciousness, oxygen and respirations were monitored throughout the procedure. A digital rectal exam was performed. The scope was introduced through the anus and advanced to the cecum. Retroflexion was performed in the rectum. Bowel prep was adequate. The patient experienced no blood loss. The procedure was moderately difficult due to loops in the digestive tract, tortuous colon and blood throughout the colon. In response to procedure difficulty, counter pressure was applied, loop reduction was employed and stiffening wire was used. The patient tolerated the procedure well. There were no apparent adverse events. Events Procedure Events Event Event Time ENDO SCOPE IN TIME 9/6/2024 10:09 AM ENDO CECUM REACHED 9/6/2024 10:21 AM ENDO SCOPE OUT TIME 9/6/2024 10:49 AM Specimens No specimens collected Procedure Location [facility] Bld[address] [facility] [id number] [phone] Referring Provider Florinda Navarro, APRN-CNP Procedure Provider MD Florinda Katz     CT angio abdomen pelvis w and or wo IV IV contrast    Result Date: 9/5/2024  Interpreted By:  Neeraj Smith, STUDY: CT ANGIO ABDOMEN PELVIS W  AND/OR WO IV IV CONTRAST;  9/5/2024 12:37 pm   INDICATION: Signs/Symptoms:Acute GI bleed.   COMPARISON: None.   ACCESSION NUMBER(S): [id number]   ORDERING CLINICIAN: JOEY EPSTEIN   PROCEDURE: CT ANGIOGRAM OF THE ABDOMEN, PELVIS, AND BILATERAL LOWER EXTREMITIES   TECHNIQUE: High-resolution contrast-enhanced helical CT ANGIO ABDOMEN PELVIS W AND/OR WO IV IV CONTRAST was performed,  without contrast and then after administration of IV contrast timed to the arterial end delayed phases. 3-D processing was performed by the physician on an independent work station, with MIP and volume-rendering techniques. Total of 75 ML of Omnipaque 350 was injected  during the examination. The study was performed without oral contrast.  The patient tolerated the injection without complications.   FINDINGS: VASCULAR:   Aorta: Moderate atherosclerosis of the abdominal aorta without aneurysm or stenosis.   Celiac artery: The artery and branches are patent without hemodynamically significant stenosis.   Superior mesenteric artery: The artery and branches are patent without hemodynamically significant stenosis.   Inferior mesenteric artery: The artery and branches are patent without hemodynamically significant stenosis.   Right renal artery: patent without hemodynamically significant stenosis.   Left renal artery: patent without hemodynamically significant stenosis.   RIGHT PELVIC AND LOWER EXTREMITY ARTERIES: Right common iliac artery: patent without hemodynamically significant stenosis. Right external iliac artery: patent without hemodynamically significant stenosis. Right internal iliac artery: Patent with moderate stenosis. Right common femoral artery: patent without hemodynamically significant stenosis. Right profunda femoris and superficial femoral arteries are visualized proximally and are patent without significant stenosis.   LEFT PELVIC AND LOWER EXTREMITY ARTERIES: Left common iliac artery: patent without hemodynamically  significant stenosis. Left external iliac artery: patent without hemodynamically significant stenosis. Left internal iliac artery: patent without hemodynamically significant stenosis. Left common femoral artery: patent without hemodynamically significant stenosis. Left profunda femoris and superficial femoral arteries are visualized proximally and are patent without significant stenosis.   NONVASCULAR FINDINGS: LUNG BASE: There are coronary artery calcifications. There are several areas of nodularity, for example in the right middle lobe, left lower lobe, and lingula, which are unchanged from 09/30/2019 and likely either represent atelectasis or benign pulmonary nodules.   ABDOMEN:   LIVER: The liver enhances homogeneously and contains several areas of hypoattenuating foci similar to 11/24/2021, which are incompletely characterized but likely represent cysts or biliary cystic neoplasms.   BILE DUCTS: Minimal intrahepatic and extrahepatic biliary dilation is likely related to cholecystectomy.   GALLBLADDER: Status post cholecystectomy.   PANCREAS: The pancreas appears unremarkable without evidence of ductal dilatation or masses.   SPLEEN: The spleen is normal in size without focal lesions.   ADRENAL GLANDS: There is a 2 cm left adrenal nodule that measured 1.7 cm on 11/24/2021. The nodule is incompletely characterized and indeterminate. The right adrenal gland appears normal.   KIDNEYS AND URETERS: The kidneys are normal in size and enhance symmetrically. Focal cortical atrophy in the right upper pole kidney unchanged from 11/24/2021. No hydroureteronephrosis or nephroureterolithiasis is identified. No suspicious renal lesions.   PELVIS:   BLADDER: Bladder is incompletely distended and suboptimally evaluated.   REPRODUCTIVE ORGANS: The uterus appears to contain calcified fibroids.   BOWEL: There is an area of questionable circumferential thickening and minimal/borderline intussusception in the right ascending colon  (image 226 series 601). There is relative distal decompression of the ascending and transverse colon, but no upstream dilation of the cecum. Areas of enhancement in the hepatic flexure and proximal transverse colon are suspected to represent wall enhancement rather than gastrointestinal bleeding. The distal esophagus and stomach are unremarkable. There is no bowel dilation. No definite evidence of active gastrointestinal bleeding. The sigmoid demonstrates at least moderate diverticulosis without diverticulitis.   VESSELS: See vascular section above.   PERITONEUM/RETROPERITONEUM/LYMPH NODES: No ascites, free gas, or collection. No adenopathy.   BONE AND SOFT TISSUE: Abdominal and pelvic wall soft tissues are unremarkable. Mild multilevel degenerative changes of the thoracolumbar spine. No acute osseous abnormality.         1. There is an area of questionable circumferential thickening and minimal/borderline intussusception in the right ascending colon with relative distal decompression, but no upstream dilation. A malignancy should be excluded and referral to gastroenterology for direct visualization should be considered, if not recently performed. Areas of enhancement in the hepatic flexure and proximal transverse colon are suspected to represent wall enhancement rather than gastrointestinal bleeding. There is no definite evidence of active gastrointestinal bleeding and subsequently no role for endovascular management at this time. 2. Interval increase in size of a 2 cm left adrenal nodule, which is incompletely characterized on this exam. Further characterization with Adrenal CT or MR is suggested. 3. Additional findings are described in the body of the report.     Critical Finding:  See findings. Notification was initiated on 9/5/2024 at 2:15 pm by  Neeraj Smith.  (**-OCF-**) Instructions:   Signed by: Neeraj Smith 9/5/2024 2:16 PM Dictation workstation:   [id number]    CT head W O contrast trauma  protocol    Result Date: 9/3/2024  Interpreted By:  Demond Bauer, STUDY: CT HEAD W/O CONTRAST TRAUMA PROTOCOL; CT CERVICAL SPINE WO IV CONTRAST;  9/3/2024 11:02 pm   INDICATION: Signs/Symptoms:fall on thinners; Signs/Symptoms:fall     COMPARISON: None.   ACCESSION NUMBER(S): [id number]; [id number]   ORDERING CLINICIAN: CONSTANZA ARMSTRONG   TECHNIQUE: Axial noncontrast CT images of head with coronal and sagittal reconstructed images. Axial noncontrast CT images of the cervical spine with coronal and sagittal reconstructed images.   FINDINGS: CT HEAD:   BRAIN PARENCHYMA: Gray-white differentiation is preserved. No mass-effect, midline shift or effacement of cerebral sulci. Mild periventricular and subcortical white matter hypodensities, nonspecific but often seen in the setting of chronic microangiopathic change.   HEMORRHAGE: No acute intracranial hemorrhage.   VENTRICLES and EXTRA-AXIAL SPACES: The ventricles and sulci are within normal limits for brain volume. No abnormal extra-axial fluid collection.   ORBITS: The visualized orbits and globes are within normal limits.   EXTRACRANIAL SOFT TISSUES: Mild right supraorbital soft tissue swelling.   PARANASAL SINUSES/MASTOIDS: The visualized paranasal sinuses and mastoid air cells are well aerated.   CALVARIUM: No depressed skull fracture.         CT CERVICAL SPINE:   CRANIOCERVICAL JUNCTION: Intact.   ALIGNMENT: No traumatic malalignment or traumatic facet widening.   VERTEBRAE/DISC SPACES: No acute fracture. Vertebral body heights are maintained. Mild-to-moderate disc space height loss with associated degenerative endplate changes and mild bilateral facet arthrosis. No high grade spinal canal stenosis evident by CT.   SOFT TISSUES: Enlarged multinodular thyroid gland. This may be further assessed with outpatient ultrasound.   OTHER: Suggestion of tree-in-bud opacities within the visualized lung apices, possibly reflecting an infectious or inflammatory process.       CT  HEAD: 1. No acute intracranial abnormality or calvarial fracture. 2. Mild right supraorbital soft tissue swelling.     CT CERVICAL SPINE: 1. No acute fracture or traumatic malalignment of the cervical spine. 2. Enlarged multinodular thyroid gland, this may be further assessed with outpatient ultrasound. 3. Suggestion of tree-in-bud opacities in the lung apices which may reflect an infectious or inflammatory process.   MACRO: None   Signed by: Demond Bauer 9/3/2024 11:23 PM Dictation workstation:   [id number]    CT cervical spine wo IV contrast    Result Date: 9/3/2024  Interpreted By:  Demond Bauer, STUDY: CT HEAD W/O CONTRAST TRAUMA PROTOCOL; CT CERVICAL SPINE WO IV CONTRAST;  9/3/2024 11:02 pm   INDICATION: Signs/Symptoms:fall on thinners; Signs/Symptoms:fall     COMPARISON: None.   ACCESSION NUMBER(S): [id number]; [id number]   ORDERING CLINICIAN: CONSTANZA ARMSTRONG   TECHNIQUE: Axial noncontrast CT images of head with coronal and sagittal reconstructed images. Axial noncontrast CT images of the cervical spine with coronal and sagittal reconstructed images.   FINDINGS: CT HEAD:   BRAIN PARENCHYMA: Gray-white differentiation is preserved. No mass-effect, midline shift or effacement of cerebral sulci. Mild periventricular and subcortical white matter hypodensities, nonspecific but often seen in the setting of chronic microangiopathic change.   HEMORRHAGE: No acute intracranial hemorrhage.   VENTRICLES and EXTRA-AXIAL SPACES: The ventricles and sulci are within normal limits for brain volume. No abnormal extra-axial fluid collection.   ORBITS: The visualized orbits and globes are within normal limits.   EXTRACRANIAL SOFT TISSUES: Mild right supraorbital soft tissue swelling.   PARANASAL SINUSES/MASTOIDS: The visualized paranasal sinuses and mastoid air cells are well aerated.   CALVARIUM: No depressed skull fracture.         CT CERVICAL SPINE:   CRANIOCERVICAL JUNCTION: Intact.   ALIGNMENT: No traumatic malalignment  or traumatic facet widening.   VERTEBRAE/DISC SPACES: No acute fracture. Vertebral body heights are maintained. Mild-to-moderate disc space height loss with associated degenerative endplate changes and mild bilateral facet arthrosis. No high grade spinal canal stenosis evident by CT.   SOFT TISSUES: Enlarged multinodular thyroid gland. This may be further assessed with outpatient ultrasound.   OTHER: Suggestion of tree-in-bud opacities within the visualized lung apices, possibly reflecting an infectious or inflammatory process.       CT HEAD: 1. No acute intracranial abnormality or calvarial fracture. 2. Mild right supraorbital soft tissue swelling.     CT CERVICAL SPINE: 1. No acute fracture or traumatic malalignment of the cervical spine. 2. Enlarged multinodular thyroid gland, this may be further assessed with outpatient ultrasound. 3. Suggestion of tree-in-bud opacities in the lung apices which may reflect an infectious or inflammatory process.   MACRO: None   Signed by: Demond Bauer 9/3/2024 11:23 PM Dictation workstation:   [id number]    CT abdomen pelvis w IV contrast    Result Date: 9/3/2024  * * *Final Report* * * DATE OF EXAM: Sep  3 2024  3:27AM   [facility]   0530  -  CT ABD/PEL W IVCON  / ACCESSION #  [id number] PROCEDURE REASON: GI bleed      * * * * Physician Interpretation * * * *  EXAMINATION:  CT ABDOMEN AND PELVIS WITH IV CONTRAST CLINICAL HISTORY: PATIENT/TECHNOLOGIST PROVIDED HISTORY:   rectal bleeding since 7pm yesterday- pt denies any abd pain- hx of rectal bleeding CLINICAL INFORMATION (AS PROVIDED BY ORDERING CLINICIAN) :  GI bleed TECHNIQUE: CT of the abdomen and pelvis was performed using standard technique, scanning from just above the dome of the diaphragm to the symphysis pubis.  Coronal and sagittal reconstructed images generated. MQ:  CTAP_3 Contrast: IV:  100 ml of Omnipaque 350 Oral:  None CT Radiation dose: Integrated Dose-length product (DLP) for this visit =   242 mGy*cm. CT Dose  Reduction Employed: Automated exposure control (AEC) COMPARISON: 11/08/2023 RESULT: Liver: 1.4 cm hypodensity left lobe most likely represents a cyst.   Otherwise unremarkable. Biliary: Gallbladder is absent.  Mild intrahepatic biliary duct dilatation likely related to postcholecystectomy state. Spleen: No mass. No splenomegaly. Pancreas: No mass or duct dilation. Adrenals: Right adrenal gland unremarkable.  Stable 1.7 cm left adrenal gland nodule. Kidneys: Enhance symmetrically.  No hydronephrosis.  Subcentimeter hypodensities right kidney are too small to accurately characterize. GI tract: No dilation or wall thickening. Appendix not visualized.   Colonic diverticulosis with no findings of acute diverticulitis.   Examination is limited for evaluation of GI bleed with no noncontrast or arterial phase imaging performed.  In light of this limitation there is   some mild increased density attenuation in the proximal to mid ascending colon (2:46-53, series 601 image 46-51 and series 602 imaged 25-31) and the possibility of active GI bleed is considered. Lymph nodes: No lymphadenopathy identified. Mesentery/Peritoneum: No free air.  No ascites. Retroperitoneum: No mass. Vasculature:  - Abdominal aorta and iliac arteries: Atherosclerotic calcifications without aneurysm.  - Celiac and SMA: Patent.  - Portal venous system (SMV, splenic vein, portal vein and branches): Patent.  - Hepatic veins: Patent. Pelvis: Urinary bladder is predominantly collapsed and otherwise unremarkable.  There are a couple calcified uterine fibroids. Bones: Osteopenia.  Degenerative changes. Lower thorax: Mild scarring right middle lobe and lingula.  No focal consolidation.  No pleural effusion. Localizer images: No additional findings.    IMPRESSION: Examination is limited for evaluation of GI bleed with no noncontrast or arterial phase imaging performed.  In light of this limitation there is some mild increased density attenuation in the  proximal to mid ascending colon (2:46-53, series 601 image 46-51 and series 602 image 25-31) and the possibility of active GI bleed is considered. Diverticulosis with no findings of acute diverticulitis. No bowel obstruction or free air. Details above. Follow-up as indicated. CRITICAL TEST/RESULTS: Acuity: Critical Finding: Active (arterial) bleeding, e.g. GI, retroperitoneal, aortic rupture, leaking/ruptured aneurysm, etc. Communication:  Communicated with GURINEDR CUENCA on  9/3/2024 5:01 AM   via verbal communication. --END OF FINDING-- : NORMAN   Transcribe Date/Time: Sep  3 2024  4:38A Dictated by : PANKAJ PALOMINO MD This examination was interpreted and the report reviewed and electronically signed by: PANKAJ PALOMINO MD on Sep  3 2024  5:06AM  EST        Assessment/Plan   Assessment & Plan  GI bleed      85 y/o F with PMHx of recurrent diverticular bleeding, Afib on Eliquis presenting as a transfer from [facility] for hematochezia.  She had initial CT while at [facility] that suggested possible bleeding from the right colon, however she had repeat imaging at  that did not show active bleeding.  She had recurrence of bleeding while here at [facility], so underwent colonoscopy yesterday that showed blood and clots throughout the colon in the setting of extensive diverticulosis, but no active bleeding and no obvious culprit lesion.  It does not seem that she has had any further bleeding since the colonoscopy yesterday.  Her Hb did drop overnight, but suspect this is re-equilibration from yesterday's bleeding episode.  Suspect bleeding was from a recurrent diverticular bleed.    - advanced diet to soft  - continue to monitor stools, H+H  - check post-transfusion CBC  - if there is further evidence of recurrent bleeding, IR intervention would be the next step  - would continue to hold Eliquis, could restart 3-5 days after bleeding has stopped, however given recurrent GI bleeding episodes she  would benefit from following up with [facility] cardiology regarding Watchman placement  - GI will sign off      Nicolas Calixto MD

## 2024-09-08 VITALS
WEIGHT: 115.08 LBS | SYSTOLIC BLOOD PRESSURE: 134 MMHG | HEART RATE: 82 BPM | TEMPERATURE: 97.7 F | BODY MASS INDEX: 19.17 KG/M2 | HEIGHT: 65 IN | RESPIRATION RATE: 18 BRPM | DIASTOLIC BLOOD PRESSURE: 68 MMHG | OXYGEN SATURATION: 99 %

## 2024-09-08 LAB
ANION GAP SERPL CALC-SCNC: 13 MMOL/L (ref 10–20)
BUN SERPL-MCNC: 9 MG/DL (ref 6–23)
CALCIUM SERPL-MCNC: 7.1 MG/DL (ref 8.6–10.3)
CHLORIDE SERPL-SCNC: 106 MMOL/L (ref 98–107)
CO2 SERPL-SCNC: 23 MMOL/L (ref 21–32)
CREAT SERPL-MCNC: 0.72 MG/DL (ref 0.5–1.05)
EGFRCR SERPLBLD CKD-EPI 2021: 83 ML/MIN/1.73M*2
ERYTHROCYTE [DISTWIDTH] IN BLOOD BY AUTOMATED COUNT: 17.9 % (ref 11.5–14.5)
GLUCOSE SERPL-MCNC: 83 MG/DL (ref 74–99)
HCT VFR BLD AUTO: 24.8 % (ref 36–46)
HGB BLD-MCNC: 7.9 G/DL (ref 12–16)
MCH RBC QN AUTO: 30.4 PG (ref 26–34)
MCHC RBC AUTO-ENTMCNC: 31.9 G/DL (ref 32–36)
MCV RBC AUTO: 95 FL (ref 80–100)
NRBC BLD-RTO: 0.2 /100 WBCS (ref 0–0)
PLATELET # BLD AUTO: 217 X10*3/UL (ref 150–450)
POTASSIUM SERPL-SCNC: 3.6 MMOL/L (ref 3.5–5.3)
RBC # BLD AUTO: 2.6 X10*6/UL (ref 4–5.2)
SODIUM SERPL-SCNC: 138 MMOL/L (ref 136–145)
WBC # BLD AUTO: 8.3 X10*3/UL (ref 4.4–11.3)

## 2024-09-08 PROCEDURE — 85027 COMPLETE CBC AUTOMATED: CPT | Performed by: INTERNAL MEDICINE

## 2024-09-08 PROCEDURE — 99239 HOSP IP/OBS DSCHRG MGMT >30: CPT | Performed by: STUDENT IN AN ORGANIZED HEALTH CARE EDUCATION/TRAINING PROGRAM

## 2024-09-08 PROCEDURE — 2500000001 HC RX 250 WO HCPCS SELF ADMINISTERED DRUGS (ALT 637 FOR MEDICARE OP): Performed by: STUDENT IN AN ORGANIZED HEALTH CARE EDUCATION/TRAINING PROGRAM

## 2024-09-08 PROCEDURE — 2500000001 HC RX 250 WO HCPCS SELF ADMINISTERED DRUGS (ALT 637 FOR MEDICARE OP): Performed by: NURSE PRACTITIONER

## 2024-09-08 PROCEDURE — 2500000002 HC RX 250 W HCPCS SELF ADMINISTERED DRUGS (ALT 637 FOR MEDICARE OP, ALT 636 FOR OP/ED): Performed by: NURSE PRACTITIONER

## 2024-09-08 PROCEDURE — 36415 COLL VENOUS BLD VENIPUNCTURE: CPT | Performed by: INTERNAL MEDICINE

## 2024-09-08 PROCEDURE — 82374 ASSAY BLOOD CARBON DIOXIDE: CPT | Performed by: INTERNAL MEDICINE

## 2024-09-08 RX ORDER — PANTOPRAZOLE SODIUM 40 MG/1
40 TABLET, DELAYED RELEASE ORAL
Status: DISCONTINUED | OUTPATIENT
Start: 2024-09-08 | End: 2024-09-08 | Stop reason: HOSPADM

## 2024-09-08 RX ADMIN — ATORVASTATIN CALCIUM 40 MG: 40 TABLET, FILM COATED ORAL at 08:43

## 2024-09-08 RX ADMIN — AMIODARONE HYDROCHLORIDE 200 MG: 200 TABLET ORAL at 08:43

## 2024-09-08 RX ADMIN — METHIMAZOLE 5 MG: 5 TABLET ORAL at 08:43

## 2024-09-08 RX ADMIN — PANTOPRAZOLE SODIUM 40 MG: 40 TABLET, DELAYED RELEASE ORAL at 08:43

## 2024-09-08 RX ADMIN — ATENOLOL 50 MG: 50 TABLET ORAL at 08:43

## 2024-09-08 RX ADMIN — FERROUS SULFATE TAB 325 MG (65 MG ELEMENTAL FE) 325 MG: 325 (65 FE) TAB at 08:44

## 2024-09-08 ASSESSMENT — COGNITIVE AND FUNCTIONAL STATUS - GENERAL
DRESSING REGULAR UPPER BODY CLOTHING: A LITTLE
DAILY ACTIVITIY SCORE: 19
TURNING FROM BACK TO SIDE WHILE IN FLAT BAD: A LITTLE
HELP NEEDED FOR BATHING: A LITTLE
MOVING TO AND FROM BED TO CHAIR: A LITTLE
WALKING IN HOSPITAL ROOM: A LITTLE
MOBILITY SCORE: 19
PERSONAL GROOMING: A LITTLE
TOILETING: A LITTLE
DRESSING REGULAR LOWER BODY CLOTHING: A LITTLE
STANDING UP FROM CHAIR USING ARMS: A LITTLE
CLIMB 3 TO 5 STEPS WITH RAILING: A LITTLE

## 2024-09-08 ASSESSMENT — ENCOUNTER SYMPTOMS
VOMITING: 0
ABDOMINAL PAIN: 0
ABDOMINAL DISTENTION: 0
FEVER: 0
SHORTNESS OF BREATH: 0

## 2024-09-08 ASSESSMENT — PAIN SCALES - GENERAL: PAINLEVEL_OUTOF10: 0 - NO PAIN

## 2024-09-08 NOTE — DISCHARGE SUMMARY
[facility] Hospitalist Discharge Summary        Dianne Marshall: 1940MRN: [id number]    ADMIT DATE: ISCHARGE DATE: 2024    PRIMARYCARE PHYSICIAN: Jyoti Carbajal DO    VISIT STATUS: Inpatient    CODE STATUS: Full Code    DISCHARGE DIAGNOSES:    Principal Problem:    GI bleed      CONSULTANTS:  GI    HOSPITAL COURSE:    Admitted for lower GI bleed in setting of diverticulosis on Eliquis. Required 1 U PRBC. Hgb stable and bleeding resolved with holding Eliquis. See colonoscopy report from  for full details. She will follow up w/ her cardiologist regarding Watchman procedure for her A fib. She will resume her Eliquis tomorrow. She would like to go home today, which is reasonable.    DAY OF DISCHARGE:  Review of Systems   Constitutional:  Negative for fever.   Respiratory:  Negative for shortness of breath.    Cardiovascular:  Negative for chest pain.   Gastrointestinal:  Negative for abdominal distention, abdominal pain and vomiting.       Patient Vitals for the past 24 hrs:   BP Temp Temp src Pulse Resp SpO2   24 0743 133/67 36.7 °C (98.1 °F) Temporal 78 18 98 %   24 0419 123/70 36.7 °C (98 °F) Temporal 89 17 98 %   24 2331 126/69 37.1 °C (98.8 °F) Oral 81 16 97 %   24 1937 103/54 36.8 °C (98.2 °F) Temporal 76 16 98 %   24 1723 106/54 -- -- 82 -- --   24 1517 97/54 36.5 °C (97.7 °F) Temporal 79 16 99 %   24 1419 (!) 93/46 -- -- -- -- --   24 1243 (!) 99/47 36.9 °C (98.4 °F) Oral 76 17 100 %   24 1123 124/63 36.3 °C (97.3 °F) Temporal 83 18 98 %   24 1045 (!) 102/43 36.5 °C (97.7 °F) Oral 76 16 100 %       Average, Min, and Max forlast 24 hours Vitals:  TEMPERATURE: Temp  Av.7 °C (98 °F)  Min: 36.3 °C (97.3 °F)  Max: 37.1 °C (98.8 °F)    RESPIRATIONS RANGE: Resp  Av.8  Min: 16  Max: 18    PULSE RANGE: Pulse  Av  Min: 76  Max: 89    BLOOD PRESSURE RANGE: Systolic (24hrs), Av , Min:93 , Max:133   ; Diastolic (24hrs), Av,  Min:43, Max:70      PULSE OXIMETRYRANGE: SpO2  Av.5 %  Min: 97 %  Max: 100 %    I/O last 3 completed shifts:  In: 1096 (21 mL/kg) [P.O.:240; Blood:356; IV Piggyback:500]  Out: - (0 mL/kg)   Weight: 52.2 kg     Physical Exam  Constitutional:       General: She is not in acute distress.  Cardiovascular:      Rate and Rhythm: Normal rate and regular rhythm.   Pulmonary:      Effort: Pulmonary effort is normal.      Breath sounds: Normal breath sounds.   Abdominal:      General: There is no distension.      Palpations: Abdomen is soft.   Neurological:      Mental Status: She is alert. Mental status is at baseline.           Discharge Meds       Your medication list        CHANGE how you take these medications        Instructions Last Dose Given Next Dose Due   apixaban 2.5 mg tablet  Commonly known as: Eliquis  What changed: additional instructions      Take 1 tablet (2.5 mg) by mouth 2 times a day. Resume Monday morning 9/9              CONTINUE taking these medications        Instructions Last Dose Given Next Dose Due   amiodarone 200 mg tablet  Commonly known as: Pacerone      Take 1 tablet by mouth once daily with a meal.       atenolol 50 mg tablet  Commonly known as: Tenormin      TAKE ONE (1) TABLET EVERY DAY       atorvastatin 40 mg tablet  Commonly known as: Lipitor      TAKE 1 TABLET BY MOUTH DAILY       FeroSuL tablet  Generic drug: ferrous sulfate (325 mg ferrous sulfate)      TAKE 1 TABLET BY MOUTH DAILY WITH FOOD       methIMAzole 5 mg tablet  Commonly known as: Tapazole      TAKE 1 TABLET BY MOUTH ONCE DAILY.       pantoprazole 40 mg EC tablet  Commonly known as: ProtoNix      TAKE 1 TABLET BY MOUTH ONCE DAILY       Voltaren Arthritis Pain 1 % gel  Generic drug: diclofenac sodium                     Where to Get Your Medications        Information about where to get these medications is not yet available    Ask your nurse or doctor about these medications  apixaban 2.5 mg tablet           FOLLOW UP  TESTING, PENDING RESULTS OR REFERRALS AT FOLLOW UP VISIT:   [X] Yes as outlined above   [ ] No    DISPOSITION: Home    FACILITY NAME: NA    Follow up with PCP Jyoti Carbajal, DO    Outpatient Follow-Up Appointments  No future appointments.    I personally examined the patient and reviewed chart.  Plan of care was discussed with patient, all questions answered.    DISCHARGE TIME: > 30 minutes providing counseling or in coordination of care. Total time on this day of visit includes record and documentation review before and after visit including documentation and time not explicitly included on EMR time stamp.    Brennan Bowie MD  [facility] Medicine

## 2024-09-08 NOTE — CARE PLAN
Problem: Fall/Injury  Goal: Not fall by end of shift  Outcome: Progressing  Goal: Be free from injury by end of the shift  Outcome: Progressing  Goal: Verbalize understanding of personal risk factors for fall in the hospital  Outcome: Progressing  Goal: Verbalize understanding of risk factor reduction measures to prevent injury from fall in the home  Outcome: Progressing  Goal: Use assistive devices by end of the shift  Outcome: Progressing  Goal: Pace activities to prevent fatigue by end of the shift  Outcome: Progressing   The patient's goals for the shift include      The clinical goals for the shift include patient will remain hemodynamically stable throughout shift

## 2024-09-10 DIAGNOSIS — I10 ESSENTIAL (PRIMARY) HYPERTENSION: ICD-10-CM

## 2024-09-10 RX ORDER — ATORVASTATIN CALCIUM 40 MG/1
40 TABLET, FILM COATED ORAL DAILY
Qty: 30 TABLET | Refills: 0 | Status: SHIPPED | OUTPATIENT
Start: 2024-09-10

## 2024-09-11 NOTE — DOCUMENTATION CLARIFICATION NOTE
PATIENT:               JUANIS CASTRO  ACCT #:                  [id number]  MRN:                       [id number]  :                       1940  ADMIT DATE:       2024 11:59 AM  DISCH DATE:        2024 1:52 PM  RESPONDING PROVIDER #:        [id number]          PROVIDER RESPONSE TEXT:    Acute blood loss anemia    CDI QUERY TEXT:    Clarification      Instruction:    Based on your assessment of the patient and the clinical information, please provide the requested documentation by clicking on the appropriate radio button and enter any additional information if prompted.      Question: Please further clarify the diagnosis of anemia as      When answering this query, please exercise your independent professional judgment. The fact that a question is being asked, does not imply that any particular answer is desired or expected.    The patient's clinical indicators include:  Clinical Information:  85 y/o [place] [facility]/o bloody bowel movements. DX GIB.      Clinical Indicators:  Medicine note per AMPARO Bella DO : ?Acute anemia secondary to GI losses?  Medicine note per AMPARO Bella DO : ?Acute anemia secondary to GI losses?  Medicine note per AMPARO Bella, DO : ?Acute anemia secondary to GI losses?  DC Summary per SHAYLA Bowie MD : ?Admitted for lower GI bleed in setting of diverticulosis on Eliquis. Required 1 U PRBC. Hgb stable and bleeding resolved with holding Eliquis.?    Labs  - :  HGB 9.1 - 9.5 - 8.5 - 8.0 - 7.3 - 7.3 - 7.2 - 6.8 - 7.9  HCT 27.6 - 29.3 - 24.1 - 23.0 - 21.9 - 23.2 - 22.9 - 20.5 - 24.8  RBC 3.25 - 2.56 - 2.48 - 2.44 - 2.28 - 2.60    Vitals trend  - : T 36.2 - 37.4, HR 56 - 100, RR 15 - 26, SBP 93 - 147, SpO2 95 - 100    GI consult  GI Lab  - Colonoscopy performed      Treatment: PO Ferrous Sulfate  - , Daily lab monitoring  Risk Factors: PMH: HTN, HLD, A Fib (Eliquis), Hyperthyroidism,  Diverticulosis  Options provided:  -- Iron deficiency anemia  -- Acute blood loss anemia  -- Macrocytic anemia  -- Chronic blood loss anemia  -- Other - I will add my own diagnosis  -- Refer to Clinical Documentation Reviewer    Query created by: Janice Martin on 9/11/2024 1:49 PM      Electronically signed by:  CHAPIN GREENE MD 9/11/2024 3:03 PM

## 2024-09-13 ENCOUNTER — OFFICE VISIT (OUTPATIENT)
Dept: PRIMARY CARE | Facility: CLINIC | Age: 84
End: 2024-09-13
Payer: MEDICARE

## 2024-09-13 VITALS
TEMPERATURE: 98.1 F | OXYGEN SATURATION: 97 % | HEART RATE: 88 BPM | SYSTOLIC BLOOD PRESSURE: 124 MMHG | WEIGHT: 116 LBS | DIASTOLIC BLOOD PRESSURE: 64 MMHG | BODY MASS INDEX: 19.3 KG/M2

## 2024-09-13 DIAGNOSIS — K57.31 DIVERTICULAR HEMORRHAGE: ICD-10-CM

## 2024-09-13 DIAGNOSIS — I48.21 PERMANENT ATRIAL FIBRILLATION (MULTI): Primary | ICD-10-CM

## 2024-09-13 DIAGNOSIS — Z23 FLU VACCINE NEED: ICD-10-CM

## 2024-09-13 PROCEDURE — 90662 IIV NO PRSV INCREASED AG IM: CPT | Performed by: FAMILY MEDICINE

## 2024-09-13 PROCEDURE — 1036F TOBACCO NON-USER: CPT | Performed by: FAMILY MEDICINE

## 2024-09-13 PROCEDURE — G0008 ADMIN INFLUENZA VIRUS VAC: HCPCS | Performed by: FAMILY MEDICINE

## 2024-09-13 PROCEDURE — 3078F DIAST BP <80 MM HG: CPT | Performed by: FAMILY MEDICINE

## 2024-09-13 PROCEDURE — 99214 OFFICE O/P EST MOD 30 MIN: CPT | Performed by: FAMILY MEDICINE

## 2024-09-13 PROCEDURE — 3074F SYST BP LT 130 MM HG: CPT | Performed by: FAMILY MEDICINE

## 2024-09-13 PROCEDURE — 1159F MED LIST DOCD IN RCRD: CPT | Performed by: FAMILY MEDICINE

## 2024-09-13 PROCEDURE — 1111F DSCHRG MED/CURRENT MED MERGE: CPT | Performed by: FAMILY MEDICINE

## 2024-09-13 ASSESSMENT — ENCOUNTER SYMPTOMS
ABDOMINAL PAIN: 0
HEADACHES: 0
APPETITE CHANGE: 0
NERVOUS/ANXIOUS: 0
FEVER: 0
MYALGIAS: 0
HEMATURIA: 0
FLANK PAIN: 0
EYE ITCHING: 0
WHEEZING: 0
SHORTNESS OF BREATH: 0
WEAKNESS: 0
DIARRHEA: 0
SLEEP DISTURBANCE: 0
EYE DISCHARGE: 0
ANAL BLEEDING: 0
RHINORRHEA: 0
NAUSEA: 0
LIGHT-HEADEDNESS: 0
SINUS PRESSURE: 0
COUGH: 0
ACTIVITY CHANGE: 0
ARTHRALGIAS: 0
NUMBNESS: 0
DYSPHORIC MOOD: 0
VOMITING: 0
DYSURIA: 0
PALPITATIONS: 0
UNEXPECTED WEIGHT CHANGE: 0
SORE THROAT: 0
CONSTIPATION: 1
DIZZINESS: 0
JOINT SWELLING: 0

## 2024-09-13 NOTE — PROGRESS NOTES
Subjective   Patient ID: Dianne Clay is a 84 y.o. female who presents for Hospital Follow-up (Was seen in [facility], [place], and [facility].  Given blood in both [place] and [facility].  Dianne states she had a fall in [place]- stitches in her R eyebrow.  No current rectal bleeding.  Scope was done with no findings.).    HPI REVIEW THE ER AND HOSPITAL RECORDS /DIVERTICULAR BLEEDING ON ELOQUIS FOR HER A FIB   PLANNING WATCHMAN FOR HER A FIB   GOT TWO UNITS OF BLOOD   SHE FEELS BETTER NOW   RECTAL BLEEDING IS ONE     Review of Systems   Constitutional:  Negative for activity change, appetite change, fever and unexpected weight change.   HENT:  Negative for congestion, ear pain, postnasal drip, rhinorrhea, sinus pressure and sore throat.    Eyes:  Negative for discharge, itching and visual disturbance.   Respiratory:  Negative for cough, shortness of breath and wheezing.    Cardiovascular:  Negative for chest pain, palpitations and leg swelling.   Gastrointestinal:  Positive for constipation. Negative for abdominal pain, anal bleeding, diarrhea, nausea and vomiting.        SEE HPI  WEAK BUT OKAY NOW   TAKING IRON    Endocrine: Negative for cold intolerance, heat intolerance and polyuria.   Genitourinary:  Negative for dysuria, flank pain and hematuria.   Musculoskeletal:  Negative for arthralgias, gait problem, joint swelling and myalgias.   Skin:  Negative for rash.   Allergic/Immunologic: Negative for environmental allergies and food allergies.   Neurological:  Negative for dizziness, syncope, weakness, light-headedness, numbness and headaches.   Psychiatric/Behavioral:  Negative for dysphoric mood and sleep disturbance. The patient is not nervous/anxious.        Objective   /64   Pulse 88   Temp 36.7 °C (98.1 °F)   Wt 52.6 kg (116 lb)   LMP  (LMP Unknown)   SpO2 97%   BMI 19.30 kg/m²     Physical Exam  Vitals and nursing note reviewed.   Constitutional:       Appearance: Normal appearance.   HENT:      Head:  Normocephalic.   Cardiovascular:      Rate and Rhythm: Normal rate. Rhythm irregular.      Pulses: Normal pulses.      Heart sounds: Normal heart sounds. No murmur heard.     No friction rub. No gallop.   Pulmonary:      Effort: Pulmonary effort is normal. No respiratory distress.      Breath sounds: Normal breath sounds. No wheezing.   Abdominal:      General: Bowel sounds are normal. There is no distension.      Palpations: Abdomen is soft.      Tenderness: There is no abdominal tenderness.   Musculoskeletal:         General: No deformity. Normal range of motion.   Skin:     General: Skin is warm and dry.      Capillary Refill: Capillary refill takes less than 2 seconds.      Coloration: Skin is pale.   Neurological:      General: No focal deficit present.      Mental Status: She is alert and oriented to person, place, and time.   Psychiatric:         Mood and Affect: Mood normal.         Assessment/Plan   Problem List Items Addressed This Visit             ICD-10-CM    Atrial fibrillation (Multi) - Primary I48.91    Relevant Orders    Referral to Cardiology     Other Visit Diagnoses         Codes    Diverticular hemorrhage     K57.31    Relevant Orders    Referral to Cardiology    Flu vaccine need     Z23

## 2024-09-13 NOTE — PATIENT INSTRUCTIONS
SUTURES OUT  WASH GENTLY WHEN NEEDED   REFERRAL TO DR ROY ABOUT THE WATCHMAN  YOU HAD THE FLU VACCINE TODAY

## 2024-09-17 DIAGNOSIS — I10 ESSENTIAL (PRIMARY) HYPERTENSION: ICD-10-CM

## 2024-09-17 RX ORDER — ATENOLOL 50 MG/1
50 TABLET ORAL DAILY
Qty: 90 TABLET | Refills: 1 | Status: SHIPPED | OUTPATIENT
Start: 2024-09-17

## 2024-09-25 DIAGNOSIS — I48.91 ATRIAL FIBRILLATION, UNSPECIFIED TYPE (MULTI): ICD-10-CM

## 2024-10-14 DIAGNOSIS — I10 ESSENTIAL (PRIMARY) HYPERTENSION: ICD-10-CM

## 2024-10-14 RX ORDER — ATORVASTATIN CALCIUM 40 MG/1
40 TABLET, FILM COATED ORAL DAILY
Qty: 30 TABLET | Refills: 0 | Status: SHIPPED | OUTPATIENT
Start: 2024-10-14

## 2024-11-01 DIAGNOSIS — K21.9 GASTROESOPHAGEAL REFLUX DISEASE WITHOUT ESOPHAGITIS: ICD-10-CM

## 2024-11-01 DIAGNOSIS — D50.9 IRON DEFICIENCY ANEMIA, UNSPECIFIED IRON DEFICIENCY ANEMIA TYPE: ICD-10-CM

## 2024-11-01 RX ORDER — PANTOPRAZOLE SODIUM 40 MG/1
TABLET, DELAYED RELEASE ORAL
Qty: 30 TABLET | Refills: 2 | Status: SHIPPED | OUTPATIENT
Start: 2024-11-01

## 2024-11-22 DIAGNOSIS — I10 ESSENTIAL (PRIMARY) HYPERTENSION: ICD-10-CM

## 2024-11-24 RX ORDER — ATORVASTATIN CALCIUM 40 MG/1
40 TABLET, FILM COATED ORAL DAILY
Qty: 30 TABLET | Refills: 0 | Status: SHIPPED | OUTPATIENT
Start: 2024-11-24

## 2024-12-05 ENCOUNTER — HOSPITAL ENCOUNTER (EMERGENCY)
Facility: HOSPITAL | Age: 84
Discharge: ANOTHER HOSPITAL | End: 2024-12-05
Attending: EMERGENCY MEDICINE
Payer: MEDICARE

## 2024-12-05 ENCOUNTER — HOSPITAL ENCOUNTER (INPATIENT)
Facility: HOSPITAL | Age: 84
LOS: 2 days | Discharge: HOME | End: 2024-12-07
Attending: STUDENT IN AN ORGANIZED HEALTH CARE EDUCATION/TRAINING PROGRAM | Admitting: INTERNAL MEDICINE
Payer: MEDICARE

## 2024-12-05 ENCOUNTER — APPOINTMENT (OUTPATIENT)
Dept: CARDIOLOGY | Facility: HOSPITAL | Age: 84
End: 2024-12-05
Payer: MEDICARE

## 2024-12-05 ENCOUNTER — APPOINTMENT (OUTPATIENT)
Dept: RADIOLOGY | Facility: HOSPITAL | Age: 84
End: 2024-12-05
Payer: MEDICARE

## 2024-12-05 VITALS
RESPIRATION RATE: 14 BRPM | SYSTOLIC BLOOD PRESSURE: 130 MMHG | WEIGHT: 115.96 LBS | OXYGEN SATURATION: 98 % | TEMPERATURE: 97.5 F | DIASTOLIC BLOOD PRESSURE: 75 MMHG | BODY MASS INDEX: 21.89 KG/M2 | HEART RATE: 67 BPM | HEIGHT: 61 IN

## 2024-12-05 DIAGNOSIS — K92.2 GASTROINTESTINAL HEMORRHAGE, UNSPECIFIED GASTROINTESTINAL HEMORRHAGE TYPE: Primary | ICD-10-CM

## 2024-12-05 DIAGNOSIS — K92.2 GI BLEED: Primary | ICD-10-CM

## 2024-12-05 DIAGNOSIS — K62.5 BLOOD PER RECTUM: ICD-10-CM

## 2024-12-05 DIAGNOSIS — N17.9 AKI (ACUTE KIDNEY INJURY) (CMS-HCC): ICD-10-CM

## 2024-12-05 PROBLEM — Z86.79 HISTORY OF ATRIAL FIBRILLATION: Status: ACTIVE | Noted: 2024-12-05

## 2024-12-05 PROBLEM — Z86.39 HISTORY OF HYPERTHYROIDISM: Status: ACTIVE | Noted: 2024-12-05

## 2024-12-05 LAB
ABO GROUP (TYPE) IN BLOOD: NORMAL
ANION GAP SERPL CALC-SCNC: 13 MMOL/L (ref 10–20)
ANTIBODY SCREEN: NORMAL
ATRIAL RATE: 66 BPM
BUN SERPL-MCNC: 22 MG/DL (ref 6–23)
CALCIUM SERPL-MCNC: 8.4 MG/DL (ref 8.6–10.3)
CHLORIDE SERPL-SCNC: 103 MMOL/L (ref 98–107)
CO2 SERPL-SCNC: 25 MMOL/L (ref 21–32)
CREAT SERPL-MCNC: 1.42 MG/DL (ref 0.5–1.05)
EGFRCR SERPLBLD CKD-EPI 2021: 37 ML/MIN/1.73M*2
ERYTHROCYTE [DISTWIDTH] IN BLOOD BY AUTOMATED COUNT: 15.9 % (ref 11.5–14.5)
GLUCOSE SERPL-MCNC: 125 MG/DL (ref 74–99)
HCT VFR BLD AUTO: 33.2 % (ref 36–46)
HGB BLD-MCNC: 10.2 G/DL (ref 12–16)
INR PPP: 1.2 (ref 0.9–1.1)
MCH RBC QN AUTO: 27 PG (ref 26–34)
MCHC RBC AUTO-ENTMCNC: 30.7 G/DL (ref 32–36)
MCV RBC AUTO: 88 FL (ref 80–100)
NRBC BLD-RTO: 0 /100 WBCS (ref 0–0)
P AXIS: 82 DEGREES
P OFFSET: 158 MS
P ONSET: 117 MS
PLATELET # BLD AUTO: 209 X10*3/UL (ref 150–450)
POTASSIUM SERPL-SCNC: 3.7 MMOL/L (ref 3.5–5.3)
PR INTERVAL: 214 MS
PROTHROMBIN TIME: 14 SECONDS (ref 9.8–12.8)
Q ONSET: 224 MS
QRS COUNT: 11 BEATS
QRS DURATION: 88 MS
QT INTERVAL: 466 MS
QTC CALCULATION(BAZETT): 488 MS
QTC FREDERICIA: 481 MS
R AXIS: 157 DEGREES
RBC # BLD AUTO: 3.78 X10*6/UL (ref 4–5.2)
RH FACTOR (ANTIGEN D): NORMAL
SODIUM SERPL-SCNC: 137 MMOL/L (ref 136–145)
T AXIS: 70 DEGREES
T OFFSET: 457 MS
TSH SERPL-ACNC: 0.59 MIU/L (ref 0.44–3.98)
VENTRICULAR RATE: 66 BPM
WBC # BLD AUTO: 9 X10*3/UL (ref 4.4–11.3)

## 2024-12-05 PROCEDURE — 96360 HYDRATION IV INFUSION INIT: CPT | Mod: 59

## 2024-12-05 PROCEDURE — 74174 CTA ABD&PLVS W/CONTRAST: CPT

## 2024-12-05 PROCEDURE — 85610 PROTHROMBIN TIME: CPT | Performed by: EMERGENCY MEDICINE

## 2024-12-05 PROCEDURE — 80048 BASIC METABOLIC PNL TOTAL CA: CPT | Performed by: EMERGENCY MEDICINE

## 2024-12-05 PROCEDURE — 36415 COLL VENOUS BLD VENIPUNCTURE: CPT | Performed by: EMERGENCY MEDICINE

## 2024-12-05 PROCEDURE — 1200000002 HC GENERAL ROOM WITH TELEMETRY DAILY

## 2024-12-05 PROCEDURE — 2500000002 HC RX 250 W HCPCS SELF ADMINISTERED DRUGS (ALT 637 FOR MEDICARE OP, ALT 636 FOR OP/ED): Performed by: NURSE PRACTITIONER

## 2024-12-05 PROCEDURE — 86901 BLOOD TYPING SEROLOGIC RH(D): CPT | Performed by: EMERGENCY MEDICINE

## 2024-12-05 PROCEDURE — 2500000004 HC RX 250 GENERAL PHARMACY W/ HCPCS (ALT 636 FOR OP/ED): Performed by: EMERGENCY MEDICINE

## 2024-12-05 PROCEDURE — 99285 EMERGENCY DEPT VISIT HI MDM: CPT | Mod: 25 | Performed by: EMERGENCY MEDICINE

## 2024-12-05 PROCEDURE — 2500000001 HC RX 250 WO HCPCS SELF ADMINISTERED DRUGS (ALT 637 FOR MEDICARE OP): Performed by: NURSE PRACTITIONER

## 2024-12-05 PROCEDURE — 85027 COMPLETE CBC AUTOMATED: CPT | Performed by: EMERGENCY MEDICINE

## 2024-12-05 PROCEDURE — 2550000001 HC RX 255 CONTRASTS: Performed by: EMERGENCY MEDICINE

## 2024-12-05 PROCEDURE — 93005 ELECTROCARDIOGRAM TRACING: CPT

## 2024-12-05 PROCEDURE — 84443 ASSAY THYROID STIM HORMONE: CPT | Performed by: NURSE PRACTITIONER

## 2024-12-05 RX ORDER — PANTOPRAZOLE SODIUM 40 MG/1
40 TABLET, DELAYED RELEASE ORAL DAILY
Status: DISCONTINUED | OUTPATIENT
Start: 2024-12-05 | End: 2024-12-07 | Stop reason: HOSPADM

## 2024-12-05 RX ORDER — ATENOLOL 50 MG/1
50 TABLET ORAL DAILY
Status: DISCONTINUED | OUTPATIENT
Start: 2024-12-05 | End: 2024-12-07 | Stop reason: HOSPADM

## 2024-12-05 RX ORDER — POLYETHYLENE GLYCOL 3350 17 G/17G
17 POWDER, FOR SOLUTION ORAL DAILY PRN
Status: DISCONTINUED | OUTPATIENT
Start: 2024-12-05 | End: 2024-12-07 | Stop reason: HOSPADM

## 2024-12-05 RX ORDER — FERROUS SULFATE 325(65) MG
1 TABLET ORAL DAILY
Status: DISCONTINUED | OUTPATIENT
Start: 2024-12-05 | End: 2024-12-07 | Stop reason: HOSPADM

## 2024-12-05 RX ORDER — ATORVASTATIN CALCIUM 40 MG/1
40 TABLET, FILM COATED ORAL DAILY
Status: DISCONTINUED | OUTPATIENT
Start: 2024-12-05 | End: 2024-12-07 | Stop reason: HOSPADM

## 2024-12-05 RX ORDER — ACETAMINOPHEN 650 MG/1
650 SUPPOSITORY RECTAL EVERY 4 HOURS PRN
Status: DISCONTINUED | OUTPATIENT
Start: 2024-12-05 | End: 2024-12-07 | Stop reason: HOSPADM

## 2024-12-05 RX ORDER — AMIODARONE HYDROCHLORIDE 200 MG/1
200 TABLET ORAL DAILY
Status: DISCONTINUED | OUTPATIENT
Start: 2024-12-05 | End: 2024-12-07 | Stop reason: HOSPADM

## 2024-12-05 RX ORDER — ACETAMINOPHEN 325 MG/1
650 TABLET ORAL EVERY 4 HOURS PRN
Status: DISCONTINUED | OUTPATIENT
Start: 2024-12-05 | End: 2024-12-07 | Stop reason: HOSPADM

## 2024-12-05 RX ORDER — METHIMAZOLE 5 MG/1
5 TABLET ORAL DAILY
Status: DISCONTINUED | OUTPATIENT
Start: 2024-12-05 | End: 2024-12-07 | Stop reason: HOSPADM

## 2024-12-05 RX ORDER — ACETAMINOPHEN 160 MG/5ML
650 SOLUTION ORAL EVERY 4 HOURS PRN
Status: DISCONTINUED | OUTPATIENT
Start: 2024-12-05 | End: 2024-12-07 | Stop reason: HOSPADM

## 2024-12-05 RX ADMIN — FERROUS SULFATE TAB 325 MG (65 MG ELEMENTAL FE) 325 MG: 325 (65 FE) TAB at 15:21

## 2024-12-05 RX ADMIN — ATENOLOL 50 MG: 50 TABLET ORAL at 15:21

## 2024-12-05 RX ADMIN — ATORVASTATIN CALCIUM 40 MG: 40 TABLET, FILM COATED ORAL at 15:21

## 2024-12-05 RX ADMIN — PANTOPRAZOLE SODIUM 40 MG: 40 TABLET, DELAYED RELEASE ORAL at 15:21

## 2024-12-05 RX ADMIN — AMIODARONE HYDROCHLORIDE 200 MG: 200 TABLET ORAL at 15:21

## 2024-12-05 RX ADMIN — METHIMAZOLE 5 MG: 5 TABLET ORAL at 16:07

## 2024-12-05 SDOH — SOCIAL STABILITY: SOCIAL INSECURITY: WITHIN THE LAST YEAR, HAVE YOU BEEN AFRAID OF YOUR PARTNER OR EX-PARTNER?: NO

## 2024-12-05 SDOH — SOCIAL STABILITY: SOCIAL INSECURITY: DO YOU FEEL ANYONE HAS EXPLOITED OR TAKEN ADVANTAGE OF YOU FINANCIALLY OR OF YOUR PERSONAL PROPERTY?: NO

## 2024-12-05 SDOH — SOCIAL STABILITY: SOCIAL INSECURITY: WITHIN THE LAST YEAR, HAVE YOU BEEN HUMILIATED OR EMOTIONALLY ABUSED IN OTHER WAYS BY YOUR PARTNER OR EX-PARTNER?: NO

## 2024-12-05 SDOH — SOCIAL STABILITY: SOCIAL NETWORK
DO YOU BELONG TO ANY CLUBS OR ORGANIZATIONS SUCH AS CHURCH GROUPS, UNIONS, FRATERNAL OR ATHLETIC GROUPS, OR SCHOOL GROUPS?: NO

## 2024-12-05 SDOH — ECONOMIC STABILITY: HOUSING INSECURITY: AT ANY TIME IN THE PAST 12 MONTHS, WERE YOU HOMELESS OR LIVING IN A SHELTER (INCLUDING NOW)?: NO

## 2024-12-05 SDOH — ECONOMIC STABILITY: FOOD INSECURITY: WITHIN THE PAST 12 MONTHS, THE FOOD YOU BOUGHT JUST DIDN'T LAST AND YOU DIDN'T HAVE MONEY TO GET MORE.: NEVER TRUE

## 2024-12-05 SDOH — SOCIAL STABILITY: SOCIAL INSECURITY: ARE YOU OR HAVE YOU BEEN THREATENED OR ABUSED PHYSICALLY, EMOTIONALLY, OR SEXUALLY BY ANYONE?: NO

## 2024-12-05 SDOH — SOCIAL STABILITY: SOCIAL INSECURITY: HAVE YOU HAD ANY THOUGHTS OF HARMING ANYONE ELSE?: NO

## 2024-12-05 SDOH — HEALTH STABILITY: PHYSICAL HEALTH
HOW OFTEN DO YOU NEED TO HAVE SOMEONE HELP YOU WHEN YOU READ INSTRUCTIONS, PAMPHLETS, OR OTHER WRITTEN MATERIAL FROM YOUR DOCTOR OR PHARMACY?: SOMETIMES

## 2024-12-05 SDOH — ECONOMIC STABILITY: INCOME INSECURITY: IN THE PAST 12 MONTHS HAS THE ELECTRIC, GAS, OIL, OR WATER COMPANY THREATENED TO SHUT OFF SERVICES IN YOUR HOME?: NO

## 2024-12-05 SDOH — ECONOMIC STABILITY: HOUSING INSECURITY: IN THE LAST 12 MONTHS, WAS THERE A TIME WHEN YOU WERE NOT ABLE TO PAY THE MORTGAGE OR RENT ON TIME?: NO

## 2024-12-05 SDOH — ECONOMIC STABILITY: FOOD INSECURITY: HOW HARD IS IT FOR YOU TO PAY FOR THE VERY BASICS LIKE FOOD, HOUSING, MEDICAL CARE, AND HEATING?: NOT HARD AT ALL

## 2024-12-05 SDOH — HEALTH STABILITY: PHYSICAL HEALTH
HOW OFTEN DO YOU NEED TO HAVE SOMEONE HELP YOU WHEN YOU READ INSTRUCTIONS, PAMPHLETS, OR OTHER WRITTEN MATERIAL FROM YOUR DOCTOR OR PHARMACY?: RARELY

## 2024-12-05 SDOH — ECONOMIC STABILITY: HOUSING INSECURITY: IN THE PAST 12 MONTHS, HOW MANY TIMES HAVE YOU MOVED WHERE YOU WERE LIVING?: 0

## 2024-12-05 SDOH — SOCIAL STABILITY: SOCIAL NETWORK
IN A TYPICAL WEEK, HOW MANY TIMES DO YOU TALK ON THE PHONE WITH FAMILY, FRIENDS, OR NEIGHBORS?: MORE THAN THREE TIMES A WEEK

## 2024-12-05 SDOH — SOCIAL STABILITY: SOCIAL INSECURITY
WITHIN THE LAST YEAR, HAVE YOU BEEN RAPED OR FORCED TO HAVE ANY KIND OF SEXUAL ACTIVITY BY YOUR PARTNER OR EX-PARTNER?: NO

## 2024-12-05 SDOH — SOCIAL STABILITY: SOCIAL INSECURITY: WERE YOU ABLE TO COMPLETE ALL THE BEHAVIORAL HEALTH SCREENINGS?: YES

## 2024-12-05 SDOH — HEALTH STABILITY: PHYSICAL HEALTH: ON AVERAGE, HOW MANY DAYS PER WEEK DO YOU ENGAGE IN MODERATE TO STRENUOUS EXERCISE (LIKE A BRISK WALK)?: 7 DAYS

## 2024-12-05 SDOH — SOCIAL STABILITY: SOCIAL NETWORK: HOW OFTEN DO YOU ATTEND CHURCH OR RELIGIOUS SERVICES?: MORE THAN 4 TIMES PER YEAR

## 2024-12-05 SDOH — SOCIAL STABILITY: SOCIAL INSECURITY: ARE YOU MARRIED, WIDOWED, DIVORCED, SEPARATED, NEVER MARRIED, OR LIVING WITH A PARTNER?: WIDOWED

## 2024-12-05 SDOH — SOCIAL STABILITY: SOCIAL INSECURITY
WITHIN THE LAST YEAR, HAVE YOU BEEN KICKED, HIT, SLAPPED, OR OTHERWISE PHYSICALLY HURT BY YOUR PARTNER OR EX-PARTNER?: NO

## 2024-12-05 SDOH — SOCIAL STABILITY: SOCIAL INSECURITY: HAS ANYONE EVER THREATENED TO HURT YOUR FAMILY OR YOUR PETS?: NO

## 2024-12-05 SDOH — HEALTH STABILITY: MENTAL HEALTH
DO YOU FEEL STRESS - TENSE, RESTLESS, NERVOUS, OR ANXIOUS, OR UNABLE TO SLEEP AT NIGHT BECAUSE YOUR MIND IS TROUBLED ALL THE TIME - THESE DAYS?: NOT AT ALL

## 2024-12-05 SDOH — SOCIAL STABILITY: SOCIAL NETWORK: HOW OFTEN DO YOU ATTEND MEETINGS OF THE CLUBS OR ORGANIZATIONS YOU BELONG TO?: NEVER

## 2024-12-05 SDOH — SOCIAL STABILITY: SOCIAL INSECURITY: ARE THERE ANY APPARENT SIGNS OF INJURIES/BEHAVIORS THAT COULD BE RELATED TO ABUSE/NEGLECT?: NO

## 2024-12-05 SDOH — ECONOMIC STABILITY: FOOD INSECURITY: WITHIN THE PAST 12 MONTHS, YOU WORRIED THAT YOUR FOOD WOULD RUN OUT BEFORE YOU GOT THE MONEY TO BUY MORE.: NEVER TRUE

## 2024-12-05 SDOH — HEALTH STABILITY: PHYSICAL HEALTH: ON AVERAGE, HOW MANY MINUTES DO YOU ENGAGE IN EXERCISE AT THIS LEVEL?: 30 MIN

## 2024-12-05 SDOH — SOCIAL STABILITY: SOCIAL INSECURITY: DO YOU FEEL UNSAFE GOING BACK TO THE PLACE WHERE YOU ARE LIVING?: NO

## 2024-12-05 SDOH — ECONOMIC STABILITY: TRANSPORTATION INSECURITY: IN THE PAST 12 MONTHS, HAS LACK OF TRANSPORTATION KEPT YOU FROM MEDICAL APPOINTMENTS OR FROM GETTING MEDICATIONS?: NO

## 2024-12-05 SDOH — SOCIAL STABILITY: SOCIAL INSECURITY: ABUSE: ADULT

## 2024-12-05 SDOH — SOCIAL STABILITY: SOCIAL INSECURITY: DOES ANYONE TRY TO KEEP YOU FROM HAVING/CONTACTING OTHER FRIENDS OR DOING THINGS OUTSIDE YOUR HOME?: NO

## 2024-12-05 SDOH — SOCIAL STABILITY: SOCIAL NETWORK: HOW OFTEN DO YOU GET TOGETHER WITH FRIENDS OR RELATIVES?: THREE TIMES A WEEK

## 2024-12-05 SDOH — SOCIAL STABILITY: SOCIAL INSECURITY: HAVE YOU HAD THOUGHTS OF HARMING ANYONE ELSE?: NO

## 2024-12-05 SDOH — SOCIAL STABILITY: SOCIAL NETWORK: HOW OFTEN DO YOU ATTEND MEETINGS OF THE CLUBS OR ORGANIZATIONS YOU BELONG TO?: MORE THAN 4 TIMES PER YEAR

## 2024-12-05 SDOH — SOCIAL STABILITY: SOCIAL NETWORK: HOW OFTEN DO YOU GET TOGETHER WITH FRIENDS OR RELATIVES?: ONCE A WEEK

## 2024-12-05 ASSESSMENT — ENCOUNTER SYMPTOMS
COUGH: 0
APNEA: 0
CHILLS: 0
AGITATION: 0
LIGHT-HEADEDNESS: 0
CONFUSION: 0
ANAL BLEEDING: 1
DIARRHEA: 0
DIZZINESS: 0
ARTHRALGIAS: 0
PALPITATIONS: 0
SORE THROAT: 0
BLOOD IN STOOL: 1
ABDOMINAL PAIN: 0
NAUSEA: 0
FREQUENCY: 0
WOUND: 0
ABDOMINAL DISTENTION: 0
ACTIVITY CHANGE: 0
HEADACHES: 0
FEVER: 0
HEMATURIA: 0
DIFFICULTY URINATING: 0
NUMBNESS: 0
DYSURIA: 0
FATIGUE: 0
FACIAL ASYMMETRY: 0
BACK PAIN: 0
VOMITING: 0
TROUBLE SWALLOWING: 0
MYALGIAS: 0
CHEST TIGHTNESS: 0

## 2024-12-05 ASSESSMENT — LIFESTYLE VARIABLES
AUDIT-C TOTAL SCORE: 1
SKIP TO QUESTIONS 9-10: 1
AUDIT-C TOTAL SCORE: 1
HOW OFTEN DO YOU HAVE 6 OR MORE DRINKS ON ONE OCCASION: NEVER
SUBSTANCE_ABUSE_PAST_12_MONTHS: NO
HOW MANY STANDARD DRINKS CONTAINING ALCOHOL DO YOU HAVE ON A TYPICAL DAY: 1 OR 2
PRESCIPTION_ABUSE_PAST_12_MONTHS: NO
PRESCIPTION_ABUSE_PAST_12_MONTHS: NO
SUBSTANCE_ABUSE_PAST_12_MONTHS: NO
HOW OFTEN DO YOU HAVE A DRINK CONTAINING ALCOHOL: MONTHLY OR LESS

## 2024-12-05 ASSESSMENT — COLUMBIA-SUICIDE SEVERITY RATING SCALE - C-SSRS
2. HAVE YOU ACTUALLY HAD ANY THOUGHTS OF KILLING YOURSELF?: NO
2. HAVE YOU ACTUALLY HAD ANY THOUGHTS OF KILLING YOURSELF?: NO
1. IN THE PAST MONTH, HAVE YOU WISHED YOU WERE DEAD OR WISHED YOU COULD GO TO SLEEP AND NOT WAKE UP?: NO
6. HAVE YOU EVER DONE ANYTHING, STARTED TO DO ANYTHING, OR PREPARED TO DO ANYTHING TO END YOUR LIFE?: NO
6. HAVE YOU EVER DONE ANYTHING, STARTED TO DO ANYTHING, OR PREPARED TO DO ANYTHING TO END YOUR LIFE?: NO
1. IN THE PAST MONTH, HAVE YOU WISHED YOU WERE DEAD OR WISHED YOU COULD GO TO SLEEP AND NOT WAKE UP?: NO

## 2024-12-05 ASSESSMENT — PATIENT HEALTH QUESTIONNAIRE - PHQ9
2. FEELING DOWN, DEPRESSED OR HOPELESS: NOT AT ALL
SUM OF ALL RESPONSES TO PHQ9 QUESTIONS 1 & 2: 0
1. LITTLE INTEREST OR PLEASURE IN DOING THINGS: NOT AT ALL

## 2024-12-05 ASSESSMENT — ACTIVITIES OF DAILY LIVING (ADL)
PATIENT'S MEMORY ADEQUATE TO SAFELY COMPLETE DAILY ACTIVITIES?: YES
WALKS IN HOME: INDEPENDENT
LACK_OF_TRANSPORTATION: NO
HEARING - RIGHT EAR: FUNCTIONAL
WALKS IN HOME: INDEPENDENT
TOILETING: INDEPENDENT
FEEDING YOURSELF: INDEPENDENT
GROOMING: INDEPENDENT
HEARING - LEFT EAR: FUNCTIONAL
LACK_OF_TRANSPORTATION: NO
ADEQUATE_TO_COMPLETE_ADL: YES
DRESSING YOURSELF: INDEPENDENT
JUDGMENT_ADEQUATE_SAFELY_COMPLETE_DAILY_ACTIVITIES: YES
BATHING: INDEPENDENT
BATHING: INDEPENDENT
FEEDING YOURSELF: INDEPENDENT
PATIENT'S MEMORY ADEQUATE TO SAFELY COMPLETE DAILY ACTIVITIES?: YES
HEARING - RIGHT EAR: FUNCTIONAL
HEARING - LEFT EAR: FUNCTIONAL
JUDGMENT_ADEQUATE_SAFELY_COMPLETE_DAILY_ACTIVITIES: YES
DRESSING YOURSELF: INDEPENDENT
GROOMING: INDEPENDENT
ADEQUATE_TO_COMPLETE_ADL: YES

## 2024-12-05 ASSESSMENT — COGNITIVE AND FUNCTIONAL STATUS - GENERAL
MOBILITY SCORE: 23
DAILY ACTIVITIY SCORE: 24
DAILY ACTIVITIY SCORE: 24
MOBILITY SCORE: 24
CLIMB 3 TO 5 STEPS WITH RAILING: A LITTLE
PATIENT BASELINE BEDBOUND: NO

## 2024-12-05 ASSESSMENT — PAIN SCALES - GENERAL
PAINLEVEL_OUTOF10: 0 - NO PAIN

## 2024-12-05 ASSESSMENT — PAIN - FUNCTIONAL ASSESSMENT
PAIN_FUNCTIONAL_ASSESSMENT: 0-10
PAIN_FUNCTIONAL_ASSESSMENT: 0-10

## 2024-12-05 NOTE — ED TRIAGE NOTES
Patient presents with GI bleeding starting approx 90 minutes ago. Blood is wine in color with clots. Patient has history of GIB in past and has had colonoscopies to treat. Patient has history of MI as well, and has been off eliquis since october

## 2024-12-05 NOTE — ASSESSMENT & PLAN NOTE
Continue methimazole 5 mg  Check TSH    Plan of Care  Home medications reconciled.  Advanced directives discussed with patient. She does have a Living Will. She wishes to be a Full Code.   Patient lives at home alone and plans to return there at discharge.  Plan of care discussed with patient and collaborating physician, Dr. Huston.

## 2024-12-05 NOTE — H&P
History Of Present Illness  Dianne Clay is a 84 y.o. female who presented with bright red from rectum with dark black stools. Patient has an extensive GI history of diverticular bleed, ulcers, colitis and rectal bleeding. Patient was taking Eliquis because of a history of paroxsymal atrial fib until 2024. She is currently in sinus rhythm.  Patient has not had any dizziness fatigue or falling. She has been symptomatic from blood GI blood loss in the past.       Past Medical History  Past Medical History:   Diagnosis Date    Adrenal nodule     Allergic contact dermatitis, unspecified cause 2015    Allergic dermatitis    Anemia     Arthritis     Atrial fibrillation, chronic (Multi)     Contusion of unspecified knee, initial encounter 2018    Knee contusion    Diverticular hemorrhage     Effusion, unspecified knee 2019    Knee swelling    GERD (gastroesophageal reflux disease)     Heart attack     Hypertension     Hyperthyroidism     Noninfective gastroenteritis and colitis, unspecified 2022    Enteritis    Noninfective gastroenteritis and colitis, unspecified     Colitis    Other conditions influencing health status     Ulcer    PAD (peripheral artery disease) (CMS-[facility])     Pain in left knee 2014    Knee pain, left anterior    Personal history of other diseases of the musculoskeletal system and connective tissue     Personal history of arthritis    Pneumonia     Rectal bleeding     Rectal ulcer     S/P primary angioplasty with coronary stent        Surgical History  Past Surgical History:   Procedure Laterality Date    CATARACT EXTRACTION  2014    Cataract Surgery     SECTION, CLASSIC  2014     Section    COLONOSCOPY  2014    Colonoscopy (Fiberoptic)    CORONARY ANGIOPLASTY WITH STENT PLACEMENT  2014    Cath Stent Placement    FLEXIBLE SIGMOIDOSCOPY      OTHER SURGICAL HISTORY  2023    Cholecystectomy laparoscopic    UPPER  GASTROINTESTINAL ENDOSCOPY      UPPER GASTROINTESTINAL ENDOSCOPY      UPPER GASTROINTESTINAL ENDOSCOPY      2024        Social History  She reports that she has never smoked. She has never been exposed to tobacco smoke. She has never used smokeless tobacco. She reports current alcohol use. She reports that she does not use drugs.    Family History  Family History   Problem Relation Name Age of Onset    Other (cardiac disorder) Mother      Other (cva) Mother      Other (cardiac disorder) Father      Other (cardiac disorder) Sister      Cancer Sister      Other (MI) Sister          Allergies  Bee venom protein (honey bee)    Review of Systems   Constitutional:  Negative for activity change, chills, fatigue and fever.   HENT:  Negative for congestion, sore throat and trouble swallowing.    Respiratory:  Negative for apnea, cough and chest tightness.    Cardiovascular:  Negative for chest pain, palpitations and leg swelling.   Gastrointestinal:  Positive for anal bleeding and blood in stool. Negative for abdominal distention, abdominal pain, diarrhea, nausea and vomiting.   Genitourinary:  Negative for difficulty urinating, dysuria, frequency, hematuria and urgency.   Musculoskeletal:  Negative for arthralgias, back pain and myalgias.   Skin:  Negative for pallor, rash and wound.   Neurological:  Negative for dizziness, facial asymmetry, light-headedness, numbness and headaches.   Psychiatric/Behavioral:  Negative for agitation, behavioral problems and confusion.         Physical Exam  Constitutional:       Appearance: Normal appearance.   Cardiovascular:      Rate and Rhythm: Normal rate and regular rhythm.      Pulses: Normal pulses.      Heart sounds: Normal heart sounds.   Pulmonary:      Effort: Pulmonary effort is normal.      Breath sounds: Normal breath sounds.   Abdominal:      General: Bowel sounds are normal.      Palpations: Abdomen is soft.   Musculoskeletal:         General: Normal range of motion.  "  Skin:     General: Skin is warm and dry.   Neurological:      Mental Status: She is alert and oriented to person, place, and time.          Last Recorded Vitals  Blood pressure 134/73, pulse 80, temperature 36.5 °C (97.7 °F), temperature source Temporal, resp. rate 16, height 1.6 m (5' 3\"), weight 54.6 kg (120 lb 5.9 oz), SpO2 100%.    Relevant Results        Results for orders placed or performed during the hospital encounter of 12/05/24 (from the past 24 hours)   CBC   Result Value Ref Range    WBC 9.0 4.4 - 11.3 x10*3/uL    nRBC 0.0 0.0 - 0.0 /100 WBCs    RBC 3.78 (L) 4.00 - 5.20 x10*6/uL    Hemoglobin 10.2 (L) 12.0 - 16.0 g/dL    Hematocrit 33.2 (L) 36.0 - 46.0 %    MCV 88 80 - 100 fL    MCH 27.0 26.0 - 34.0 pg    MCHC 30.7 (L) 32.0 - 36.0 g/dL    RDW 15.9 (H) 11.5 - 14.5 %    Platelets 209 150 - 450 x10*3/uL   Basic metabolic panel   Result Value Ref Range    Glucose 125 (H) 74 - 99 mg/dL    Sodium 137 136 - 145 mmol/L    Potassium 3.7 3.5 - 5.3 mmol/L    Chloride 103 98 - 107 mmol/L    Bicarbonate 25 21 - 32 mmol/L    Anion Gap 13 10 - 20 mmol/L    Urea Nitrogen 22 6 - 23 mg/dL    Creatinine 1.42 (H) 0.50 - 1.05 mg/dL    eGFR 37 (L) >60 mL/min/1.73m*2    Calcium 8.4 (L) 8.6 - 10.3 mg/dL   ECG 12 lead   Result Value Ref Range    Ventricular Rate 66 BPM    Atrial Rate 66 BPM    PA Interval 214 ms    QRS Duration 88 ms    QT Interval 466 ms    QTC Calculation(Bazett) 488 ms    P Axis 82 degrees    R Axis 157 degrees    T Axis 70 degrees    QRS Count 11 beats    Q Onset 224 ms    P Onset 117 ms    P Offset 158 ms    T Offset 457 ms    QTC Fredericia 481 ms   Protime-INR   Result Value Ref Range    Protime 14.0 (H) 9.8 - 12.8 seconds    INR 1.2 (H) 0.9 - 1.1   Type and Screen   Result Value Ref Range    ABO TYPE A     Rh TYPE POS     ANTIBODY SCREEN NEG           Assessment/Plan   Assessment & Plan  GI bleed  Consult GI  Transfuse for Hbg <7  Clear liquids  History of atrial fibrillation  Continue amiodarone and " atenolol  History of hyperthyroidism  Continue methimazole 5 mg  Check TSH    Plan of Care  Home medications reconciled.  Advanced directives discussed with patient. She does have a Living Will. She wishes to be a Full Code.   Patient lives at home alone and plans to return there at discharge.  Plan of care discussed with patient and collaborating physician, Dr. Huston.            I spent 50 minutes in the professional and overall care of this patient.      Alexandria Connors, JEREMY-CNP

## 2024-12-05 NOTE — CARE PLAN
The patient's goals for the shift include resolution of bloody stool     The clinical goals for the shift include resolve hemodynamics from blood loss

## 2024-12-05 NOTE — ED PROVIDER NOTES
"HPI   Chief Complaint   Patient presents with    GI Problem     Patient has had numerous GIB in past       HPI  Patient an 84-year-old female presenting to the ED today for bloody bowel movements.  Patient states that she started having bloody bowel movements approximately 90 minutes ago.  She notes that she has had about 4 episodes in the past 90 minutes prior to arrival.  She describes wine colored bowel movements with clots present.  She notes that she has had previous similar episodes of bloody bowel movements requiring blood transfusions.  She explains that this time, she decided to come sooner before she \"started feeling dizzy,\" as she has done in the past.  She otherwise denies any associated pain.  She was previously on Eliquis, but was taken off of it about 2 months ago.  At this time, she denies any current dizziness or lightheadedness.  She denies any abdominal pain, nausea, or vomiting.      Patient History   Past Medical History:   Diagnosis Date    Adrenal nodule     Allergic contact dermatitis, unspecified cause 11/07/2015    Allergic dermatitis    Anemia     Arthritis     Atrial fibrillation, chronic (Multi)     Contusion of unspecified knee, initial encounter 07/03/2018    Knee contusion    Effusion, unspecified knee 03/06/2019    Knee swelling    Encounter for follow-up examination after completed treatment for conditions other than malignant neoplasm 04/01/2022    Hospital discharge follow-up    GERD (gastroesophageal reflux disease)     Heart attack     Hypertension     Hyperthyroidism     Noninfective gastroenteritis and colitis, unspecified 04/01/2022    Enteritis    Noninfective gastroenteritis and colitis, unspecified     Colitis    Other conditions influencing health status 09/21/2021    History of cough    Other conditions influencing health status     Ulcer    PAD (peripheral artery disease) (CMS-[facility])     Pain in left knee 08/11/2014    Knee pain, left anterior    Personal history of other " diseases of the musculoskeletal system and connective tissue     Personal history of arthritis    Personal history of other specified conditions 10/21/2016    History of epistaxis    Personal history of other specified conditions 2015    History of dizziness    Personal history of pneumonia (recurrent) 2022    History of community acquired pneumonia    Rectal bleeding     Rectal ulcer     S/P primary angioplasty with coronary stent      Past Surgical History:   Procedure Laterality Date    CATARACT EXTRACTION  2014    Cataract Surgery     SECTION, CLASSIC  2014     Section    COLONOSCOPY  2014    Colonoscopy (Fiberoptic)    CORONARY ANGIOPLASTY WITH STENT PLACEMENT  2014    Cath Stent Placement    FLEXIBLE SIGMOIDOSCOPY      OTHER SURGICAL HISTORY  2023    Cholecystectomy laparoscopic    UPPER GASTROINTESTINAL ENDOSCOPY      UPPER GASTROINTESTINAL ENDOSCOPY      UPPER GASTROINTESTINAL ENDOSCOPY           Family History   Problem Relation Name Age of Onset    Other (cardiac disorder) Mother      Other (cardiac disorder) Father      Other (cardiac disorder) Sister      Cancer Sister       Social History     Tobacco Use    Smoking status: Never     Passive exposure: Never    Smokeless tobacco: Never   Vaping Use    Vaping status: Never Used   Substance Use Topics    Alcohol use: Yes     Comment: RARE    Drug use: Never       Physical Exam   ED Triage Vitals   Temperature Heart Rate Respirations BP   24 0155 24 0155 24 0155 24 0155   36.4 °C (97.6 °F) 56 15 125/79      Pulse Ox Temp Source Heart Rate Source Patient Position   24 0145 24 0155 24 0300 24 0300   100 % Oral Monitor Lying      BP Location FiO2 (%)     24 0300 --     Right arm        Physical Exam  Vitals and nursing note reviewed.   Constitutional:       General: She is not in acute distress.     Appearance: She is not toxic-appearing.   HENT:       Head: Normocephalic.      Mouth/Throat:      Mouth: Mucous membranes are moist.   Eyes:      Extraocular Movements: Extraocular movements intact.      Conjunctiva/sclera: Conjunctivae normal.   Cardiovascular:      Rate and Rhythm: Normal rate and regular rhythm.      Pulses: Normal pulses.   Pulmonary:      Effort: Pulmonary effort is normal. No respiratory distress.      Breath sounds: Normal breath sounds. No wheezing.   Abdominal:      General: There is no distension.      Palpations: Abdomen is soft.      Tenderness: There is no abdominal tenderness.   Musculoskeletal:         General: No swelling.      Cervical back: Neck supple.   Skin:     General: Skin is warm and dry.      Capillary Refill: Capillary refill takes less than 2 seconds.   Neurological:      General: No focal deficit present.      Mental Status: She is alert. Mental status is at baseline.           ED Course & MDM   ED Course as of 12/05/24 0611   Thu Dec 05, 2024   0317 EKG obtained at 202, interpreted by myself.  Normal sinus rhythm with a ventricular rate of 66, right axis deviation, prolonged TX interval of 214, otherwise normal intervals with no acute ischemic changes [VT]      ED Course User Index  [VT] Valerie LERNER MD         Diagnoses as of 12/05/24 0611   Gastrointestinal hemorrhage, unspecified gastrointestinal hemorrhage type   Blood per rectum   GERARD (acute kidney injury) (CMS-[facility])             No data recorded     Esvin Coma Scale Score: 15 (12/05/24 0154 : Charlotte Shaw RN)                       Medical Decision Making  Patient was seen and evaluated for blood per rectum.  On arrival, vital signs are unremarkable.  Patient is normotensive, nontachycardic.  Patient is placed on a Monitor with continuous pulse ox.  Peripheral IV is established.  Initial lab work and imaging are ordered for further evaluation of her symptoms.    CBC is unremarkable.  Hemoglobin is 10.2, significantly improved from previous hemoglobin of 7.9  from approximately 2 months ago.  CMP shows slightly elevated creatinine of 1.4, otherwise unremarkable.  Given elevated creatinine consistent with GERARD, patient is administered 1 L of normal saline.  INR is unremarkable at 1.2.    CT angio abdomen pelvis w and or wo IV IV contrast   Final Result   No aortic aneurysm or dissection.        Diffuse colonic wall thickening, which may be seen in the setting of   colitis. No definite CTA evidence for active intraluminal bleeding.        Gastric wall thickening. Correlate for symptoms of gastritis.        Calcified uterine fibroids.        Indeterminate 2 cm left adrenal nodule, which is similar in size   compared to prior recent imaging 09/05/2024. Further characterization   can be obtained with nonemergent MRI..        MACRO:   None.        Signed by: Evan Finkelstein 12/5/2024 4:13 AM   Dictation workstation:   [id number]        On reevaluation, patient is resting comfortably in bed.  She did have 1 episode of bloody bowel movement just after arrival to the ED, but since then has been sleeping comfortably.  She has remained hemodynamically stable.  Patient was informed of their lab and imaging results, and all questions and concerns were answered.  As there is no surgery/GI available here at [facility] currently, transfer planning for further management was discussed at this time, to which the patient was agreeable.  I discussed the case with Dr. Matos, hospitalist at [facility], who accepts patient for transfer.      Procedure  Procedures     Valerie LERNER MD  12/05/24 7213

## 2024-12-06 ENCOUNTER — APPOINTMENT (OUTPATIENT)
Dept: CARDIOLOGY | Facility: HOSPITAL | Age: 84
End: 2024-12-06
Payer: MEDICARE

## 2024-12-06 LAB
ALBUMIN SERPL BCP-MCNC: 2.9 G/DL (ref 3.4–5)
ALP SERPL-CCNC: 80 U/L (ref 33–136)
ALT SERPL W P-5'-P-CCNC: 8 U/L (ref 7–45)
ANION GAP SERPL CALCULATED.3IONS-SCNC: 10 MMOL/L (ref 10–20)
AST SERPL W P-5'-P-CCNC: 15 U/L (ref 9–39)
BILIRUB SERPL-MCNC: 0.8 MG/DL (ref 0–1.2)
BUN SERPL-MCNC: 11 MG/DL (ref 6–23)
CALCIUM SERPL-MCNC: 8 MG/DL (ref 8.6–10.3)
CHLORIDE SERPL-SCNC: 106 MMOL/L (ref 98–107)
CO2 SERPL-SCNC: 23 MMOL/L (ref 21–32)
CREAT SERPL-MCNC: 0.72 MG/DL (ref 0.5–1.05)
EGFRCR SERPLBLD CKD-EPI 2021: 83 ML/MIN/1.73M*2
ERYTHROCYTE [DISTWIDTH] IN BLOOD BY AUTOMATED COUNT: 16.1 % (ref 11.5–14.5)
GLUCOSE SERPL-MCNC: 89 MG/DL (ref 74–99)
HCT VFR BLD AUTO: 26.7 % (ref 36–46)
HGB BLD-MCNC: 8.3 G/DL (ref 12–16)
MCH RBC QN AUTO: 27 PG (ref 26–34)
MCHC RBC AUTO-ENTMCNC: 31.1 G/DL (ref 32–36)
MCV RBC AUTO: 87 FL (ref 80–100)
NRBC BLD-RTO: 0 /100 WBCS (ref 0–0)
PLATELET # BLD AUTO: 176 X10*3/UL (ref 150–450)
POTASSIUM SERPL-SCNC: 3.4 MMOL/L (ref 3.5–5.3)
PROT SERPL-MCNC: 7.6 G/DL (ref 6.4–8.2)
RBC # BLD AUTO: 3.07 X10*6/UL (ref 4–5.2)
SODIUM SERPL-SCNC: 136 MMOL/L (ref 136–145)
WBC # BLD AUTO: 6.1 X10*3/UL (ref 4.4–11.3)

## 2024-12-06 PROCEDURE — 80053 COMPREHEN METABOLIC PANEL: CPT | Performed by: NURSE PRACTITIONER

## 2024-12-06 PROCEDURE — 85027 COMPLETE CBC AUTOMATED: CPT | Performed by: NURSE PRACTITIONER

## 2024-12-06 PROCEDURE — 97161 PT EVAL LOW COMPLEX 20 MIN: CPT | Mod: GP

## 2024-12-06 PROCEDURE — 93005 ELECTROCARDIOGRAM TRACING: CPT

## 2024-12-06 PROCEDURE — 2500000002 HC RX 250 W HCPCS SELF ADMINISTERED DRUGS (ALT 637 FOR MEDICARE OP, ALT 636 FOR OP/ED): Performed by: NURSE PRACTITIONER

## 2024-12-06 PROCEDURE — 1200000002 HC GENERAL ROOM WITH TELEMETRY DAILY

## 2024-12-06 PROCEDURE — 2500000001 HC RX 250 WO HCPCS SELF ADMINISTERED DRUGS (ALT 637 FOR MEDICARE OP): Performed by: NURSE PRACTITIONER

## 2024-12-06 PROCEDURE — 97165 OT EVAL LOW COMPLEX 30 MIN: CPT | Mod: GO

## 2024-12-06 PROCEDURE — 36415 COLL VENOUS BLD VENIPUNCTURE: CPT | Performed by: NURSE PRACTITIONER

## 2024-12-06 RX ADMIN — ACETAMINOPHEN 650 MG: 325 TABLET, FILM COATED ORAL at 18:24

## 2024-12-06 RX ADMIN — FERROUS SULFATE TAB 325 MG (65 MG ELEMENTAL FE) 325 MG: 325 (65 FE) TAB at 10:01

## 2024-12-06 RX ADMIN — ATENOLOL 50 MG: 50 TABLET ORAL at 10:01

## 2024-12-06 RX ADMIN — PANTOPRAZOLE SODIUM 40 MG: 40 TABLET, DELAYED RELEASE ORAL at 10:01

## 2024-12-06 RX ADMIN — ATORVASTATIN CALCIUM 40 MG: 40 TABLET, FILM COATED ORAL at 10:01

## 2024-12-06 RX ADMIN — METHIMAZOLE 5 MG: 5 TABLET ORAL at 10:01

## 2024-12-06 RX ADMIN — AMIODARONE HYDROCHLORIDE 200 MG: 200 TABLET ORAL at 10:01

## 2024-12-06 ASSESSMENT — COGNITIVE AND FUNCTIONAL STATUS - GENERAL
HELP NEEDED FOR BATHING: A LITTLE
CLIMB 3 TO 5 STEPS WITH RAILING: A LITTLE
MOBILITY SCORE: 23
TOILETING: A LITTLE
MOBILITY SCORE: 23
CLIMB 3 TO 5 STEPS WITH RAILING: A LITTLE
WALKING IN HOSPITAL ROOM: A LITTLE
DRESSING REGULAR LOWER BODY CLOTHING: A LITTLE
PERSONAL GROOMING: A LITTLE
DAILY ACTIVITIY SCORE: 24
DAILY ACTIVITIY SCORE: 18
DRESSING REGULAR UPPER BODY CLOTHING: A LITTLE
CLIMB 3 TO 5 STEPS WITH RAILING: A LITTLE
DAILY ACTIVITIY SCORE: 24
STANDING UP FROM CHAIR USING ARMS: A LITTLE
MOBILITY SCORE: 18
TURNING FROM BACK TO SIDE WHILE IN FLAT BAD: A LITTLE
MOVING TO AND FROM BED TO CHAIR: A LITTLE
MOVING FROM LYING ON BACK TO SITTING ON SIDE OF FLAT BED WITH BEDRAILS: A LITTLE
EATING MEALS: A LITTLE

## 2024-12-06 ASSESSMENT — ACTIVITIES OF DAILY LIVING (ADL)
ADL_ASSISTANCE: INDEPENDENT
BATHING_ASSISTANCE: STAND BY
ADL_ASSISTANCE: INDEPENDENT
LACK_OF_TRANSPORTATION: NO

## 2024-12-06 ASSESSMENT — PAIN SCALES - GENERAL
PAINLEVEL_OUTOF10: 8
PAINLEVEL_OUTOF10: 0 - NO PAIN

## 2024-12-06 ASSESSMENT — PAIN DESCRIPTION - LOCATION: LOCATION: SHOULDER

## 2024-12-06 ASSESSMENT — ENCOUNTER SYMPTOMS
ABDOMINAL PAIN: 0
WEAKNESS: 0
DIARRHEA: 0
CONSTIPATION: 0
VOMITING: 0
CHILLS: 0
NAUSEA: 0
BLOOD IN STOOL: 1
FEVER: 0

## 2024-12-06 ASSESSMENT — PAIN - FUNCTIONAL ASSESSMENT
PAIN_FUNCTIONAL_ASSESSMENT: 0-10

## 2024-12-06 ASSESSMENT — PAIN SCALES - WONG BAKER: WONGBAKER_NUMERICALRESPONSE: HURTS EVEN MORE

## 2024-12-06 ASSESSMENT — PAIN DESCRIPTION - ORIENTATION: ORIENTATION: LEFT

## 2024-12-06 NOTE — CARE PLAN
Problem: Pain - Adult  Goal: Verbalizes/displays adequate comfort level or baseline comfort level  Outcome: Progressing     Problem: Discharge Planning  Goal: Discharge to home or other facility with appropriate resources  Outcome: Progressing     Problem: Safety - Adult  Goal: Free from fall injury  Outcome: Progressing     Problem: Chronic Conditions and Co-morbidities  Goal: Patient's chronic conditions and co-morbidity symptoms are monitored and maintained or improved  Outcome: Progressing   The patient's goals for the shift include      The clinical goals for the shift include keep pt comfortable

## 2024-12-06 NOTE — ASSESSMENT & PLAN NOTE
CT showed diffuse colonic wall thickening   GI following; they have advanced her diet and recommended to send stool for studies if she is having diarrhea, monitor H&H and transfuse for<7  Hbg at admission 10.2 today 8.3  H&H in am

## 2024-12-06 NOTE — CONSULTS
Consults    Reason For Consult  GI Bleed    History Of Present Illness  Dianne Clay is a 84 y.o. female presenting with red bloody stools and clots. Starting yesterday. Denies any abdominal pain, nausea, vomiting. She is on Eliquis. Hgb 8.3. She has had at least 4 different hospitalizations related to GI bleeding in the past few years. She was actually just at [facility] in 2024 for same. At that time, CT showed ?colonic wall thickening. Colonoscopy completed at that time showed diffuse blood of unclear source, presumed diverticular. Same in  and , and . This admission, CT with diffuse wall thickening. Denies any hx UC      Past Medical History  She has a past medical history of Adrenal nodule, Allergic contact dermatitis, unspecified cause (2015), Anemia, Arthritis, Atrial fibrillation, chronic (Multi), Contusion of unspecified knee, initial encounter (2018), Diverticular hemorrhage, Effusion, unspecified knee (2019), GERD (gastroesophageal reflux disease), Heart attack, Hypertension, Hyperthyroidism, Noninfective gastroenteritis and colitis, unspecified (2022), Noninfective gastroenteritis and colitis, unspecified, Other conditions influencing health status, PAD (peripheral artery disease) (CMS-[facility]), Pain in left knee (2014), Personal history of other diseases of the musculoskeletal system and connective tissue, Pneumonia, Rectal bleeding, Rectal ulcer, and S/P primary angioplasty with coronary stent.    Surgical History  She has a past surgical history that includes Colonoscopy (2014);  section, classic (2014); Cataract extraction (2014); Coronary angioplasty with stent (2014); Other surgical history (2023); Upper gastrointestinal endoscopy; Upper gastrointestinal endoscopy; Flexible sigmoidoscopy; and Upper gastrointestinal endoscopy.     Social History  She reports that she has never smoked. She has never been exposed to tobacco  "smoke. She has never used smokeless tobacco. She reports current alcohol use. She reports that she does not use drugs.    Family History  Family History   Problem Relation Name Age of Onset    Other (cardiac disorder) Mother      Other (cva) Mother      Other (cardiac disorder) Father      Other (cardiac disorder) Sister      Cancer Sister      Other (MI) Sister          Allergies  Bee venom protein (honey bee)    Review of Systems   Constitutional:  Negative for chills and fever.   Gastrointestinal:  Positive for blood in stool. Negative for abdominal pain, constipation, diarrhea, nausea and vomiting.   Neurological:  Negative for weakness.        Physical Exam  Constitutional:       Appearance: Normal appearance.   HENT:      Head: Normocephalic and atraumatic.      Mouth/Throat:      Mouth: Mucous membranes are moist.   Pulmonary:      Effort: Pulmonary effort is normal.   Abdominal:      General: There is no distension.      Palpations: Abdomen is soft.      Tenderness: There is abdominal tenderness. There is no guarding.   Musculoskeletal:         General: Normal range of motion.      Cervical back: Normal range of motion.   Skin:     General: Skin is warm and dry.   Neurological:      General: No focal deficit present.      Mental Status: She is alert. Mental status is at baseline.   Psychiatric:         Mood and Affect: Mood normal.          Last Recorded Vitals  Blood pressure 113/65, pulse 69, temperature 36.5 °C (97.7 °F), temperature source Temporal, resp. rate 17, height 1.6 m (5' 3\"), weight 54.6 kg (120 lb 5.9 oz), SpO2 98%.    Relevant Results  Results for orders placed or performed during the hospital encounter of 12/05/24 (from the past 24 hours)   TSH with reflex to Free T4 if abnormal   Result Value Ref Range    Thyroid Stimulating Hormone 0.59 0.44 - 3.98 mIU/L   Comprehensive metabolic panel   Result Value Ref Range    Glucose 89 74 - 99 mg/dL    Sodium 136 136 - 145 mmol/L    Potassium 3.4 (L) " 3.5 - 5.3 mmol/L    Chloride 106 98 - 107 mmol/L    Bicarbonate 23 21 - 32 mmol/L    Anion Gap 10 10 - 20 mmol/L    Urea Nitrogen 11 6 - 23 mg/dL    Creatinine 0.72 0.50 - 1.05 mg/dL    eGFR 83 >60 mL/min/1.73m*2    Calcium 8.0 (L) 8.6 - 10.3 mg/dL    Albumin 2.9 (L) 3.4 - 5.0 g/dL    Alkaline Phosphatase 80 33 - 136 U/L    Total Protein 7.6 6.4 - 8.2 g/dL    AST 15 9 - 39 U/L    Bilirubin, Total 0.8 0.0 - 1.2 mg/dL    ALT 8 7 - 45 U/L   CBC   Result Value Ref Range    WBC 6.1 4.4 - 11.3 x10*3/uL    nRBC 0.0 0.0 - 0.0 /100 WBCs    RBC 3.07 (L) 4.00 - 5.20 x10*6/uL    Hemoglobin 8.3 (L) 12.0 - 16.0 g/dL    Hematocrit 26.7 (L) 36.0 - 46.0 %    MCV 87 80 - 100 fL    MCH 27.0 26.0 - 34.0 pg    MCHC 31.1 (L) 32.0 - 36.0 g/dL    RDW 16.1 (H) 11.5 - 14.5 %    Platelets 176 150 - 450 x10*3/uL     ECG 12 lead    Result Date: 12/5/2024  Sinus rhythm with 1st degree AV block with Premature atrial complexes Right axis deviation Pulmonary disease pattern Abnormal ECG When compared with ECG of 27-NOV-2021 11:14, Premature atrial complexes are now Present NC interval has increased T wave inversion no longer evident in Inferior leads Nonspecific T wave abnormality no longer evident in Anterior leads    CT angio abdomen pelvis w and or wo IV IV contrast    Result Date: 12/5/2024  Interpreted By:  Finkelstein, Evan, STUDY: CT ANGIO ABDOMEN PELVIS W AND/OR WO IV IV CONTRAST;  12/5/2024 3:46 am   INDICATION: Signs/Symptoms:gi bleed.     COMPARISON: CT abdomen 09/05/2024   ACCESSION NUMBER(S): [id number]   ORDERING CLINICIAN: CONSTANZA ARMSTRONG   TECHNIQUE: Noncontrast axial CT imaging of the abdomen and pelvis. Subsequent CTA imaging of the abdomen and pelvis was obtained after intravenous administration of  75 mL Omnipaque 350. Sagittal and coronal reconstructions. 3D reconstructions were obtained at a separate workstation.   FINDINGS: Vascular:  No intramural hematoma on noncontrast imaging. Mild aortoiliac calcifications. No aortic  aneurysm or dissection.  The celiac trunk, superior mesenteric artery, renal arteries and inferior mesenteric artery are patent.   LOWER CHEST: The heart is enlarged.   ABDOMEN/PELVIS:   LIVER: 1 cm hypodensity in the left hepatic lobe measures simple fluid attenuation is most compatible with a simple cyst. There are subcentimeter hypodensities, which are too small to characterize. BILE DUCTS: Normal caliber. GALLBLADDER: Surgically absent SPLEEN: Unremarkable. PANCREAS: Unremarkable. ADRENALS: Indeterminate 2 cm left adrenal nodule. KIDNEYS, URETERS AND BLADDER: Symmetric renal enhancement. No hydronephrosis.  Bladder is within normal limits REPRODUCTIVE ORGANS: There are calcified uterine fibroids. No significant free pelvic fluid.   ABDOMINAL WALL: Within normal limits.   BOWEL: Gastric wall thickening. No small or large bowel dilatation. Diffuse colonic wall thickening. The appendix is not definitively visualized, without focal pericecal inflammatory stranding.. No definite CTA evidence for active intraluminal bleeding. PERITONEUM: No ascites or free air, no fluid collection.   RETROPERITONEUM: No pathologically enlarged retroperitoneal lymph nodes.   BONES: No acute osseous abnormality.       No aortic aneurysm or dissection.   Diffuse colonic wall thickening, which may be seen in the setting of colitis. No definite CTA evidence for active intraluminal bleeding.   Gastric wall thickening. Correlate for symptoms of gastritis.   Calcified uterine fibroids.   Indeterminate 2 cm left adrenal nodule, which is similar in size compared to prior recent imaging 09/05/2024. Further characterization can be obtained with nonemergent MRI..   MACRO: None.   Signed by: Evan Finkelstein 12/5/2024 4:13 AM Dictation workstation:   [id number]        Assessment/Plan     Rectal Hemorrhage, Anemia, Abnormal CT   Sudden onset painless red bloody stools with clots. She has hx prior diverticular bleeding in 2024, 2023, 2022, and  2014. Presumed diverticular. Recent colonoscopy Sept 2024 did not some colonic inflammation. This diffuse bowel wall thickening was new on imaging yesterday. No plan for repeat colonoscopy as she just had one in Sept. She is poor candidate for anticoagulation from GI standpoint. Consider watchmans    -OK for low fiber diet    -Monitor HH, transfuse for hgb 7    -If having diarrhea, send stool studies given new diffuse colonic wall thickening    I spent 30 minutes in the professional and overall care of this patient.

## 2024-12-06 NOTE — PROGRESS NOTES
Occupational Therapy    Evaluation    Patient Name: Dianne Clay  MRN: [id number]  Department: [address]  Room: [facility]419-A  Today's Date: 12/6/2024  Time Calculation  Start Time: 0820  Stop Time: 0829  Time Calculation (min): 9 min    Assessment  IP OT Assessment  OT Assessment: 83 y/o female here with BRBPR.  On eval she requires slightly increased assist for xfers, mobility, and self care d/t decreased strength, balance, activity tolerance. Skilled OT services are required to maximize safety/IND prior to DC  Prognosis: Excellent  Barriers to Discharge: None  Evaluation/Treatment Tolerance: Patient tolerated treatment well  Medical Staff Made Aware: Yes  End of Session Communication: Bedside nurse  End of Session Patient Position: Bed, 3 rail up, Alarm on  Plan:  Treatment Interventions: ADL retraining, Functional transfer training, UE strengthening/ROM, Endurance training, Patient/family training, Equipment evaluation/education, Neuromuscular reeducation, Compensatory technique education  OT Frequency: 3 times per week  OT Discharge Recommendations: Low intensity level of continued care  Equipment Recommended upon Discharge: Wheeled walker  OT Recommended Transfer Status: Assist of 1  OT - OK to Discharge: Yes    Subjective   Current Problem:  1. GI bleed          General:  General  Reason for Referral: Decreased ADLS, BRBPR  Referred By: Dr. Huston  Past Medical History Relevant to Rehab: GI bleed, diverticular bleed, ulcers, colitis, afib, HTN  Family/Caregiver Present: No  Prior to Session Communication: Bedside nurse  Patient Position Received: Bed, 3 rail up, Alarm on  Preferred Learning Style: visual, verbal  General Comment: Cleared by nsg, pt met in supine, agreeable to OT session  Precautions:  Hearing/Visual Limitations: vision and hearing WFL  Medical Precautions: Fall precautions    Pain:  Pain Assessment  Pain Assessment: 0-10  0-10 (Numeric) Pain Score: 0 - No pain    Objective   Cognition:  Overall  Cognitive Status: Within Functional Limits  Orientation Level: Oriented X4  Cognition Comments: follows commands appropriately    Home Living:  Type of Home: House  Lives With: Alone  Home Adaptive Equipment: Walker rolling or standard, Wheelchair-manual, Other (Comment) (rollator)  Home Layout: Multi-level, Able to live on main level with bedroom/bathroom  Home Access: Ramped entrance, Stairs to enter with rails  Entrance Stairs-Number of Steps: 3 MARJORIE or ramp  Bathroom Shower/Tub: Tub/shower unit  Bathroom Toilet: Standard  Bathroom Equipment: Grab bars in shower, Shower chair with back  Bathroom Accessibility: main level     Prior Function:  Level of [place]: Independent with ADLs and functional transfers, Independent with homemaking with ambulation  Receives Help From: Neighbor  ADL Assistance: Independent  Homemaking Assistance: Independent  Ambulatory Assistance: Independent  Vocational: Retired  Leisure: taking care of her dog  Prior Function Comments: + drives, active and IND    ADL:  Eating Assistance: Stand by  Eating Deficit: Setup  Grooming Assistance: Stand by  Grooming Deficit: Setup  Bathing Assistance: Stand by  Bathing Deficit: Supervision/safety (anticipated)  UE Dressing Assistance: Stand by  UE Dressing Deficit: Supervision/safety (gown mgmt)  LE Dressing Assistance: Stand by  LE Dressing Deficit: Supervision/safety (anticipated)  Toileting Assistance with Device: Stand by  Toileting Deficit: Supervison/safety    Activity Tolerance:  Endurance: Decreased tolerance for upright activites  Activity Tolerance Comments: fair tolerance    Bed Mobility/Transfers:   Bed Mobility  Bed Mobility: Yes  Bed Mobility 1  Bed Mobility 1: Supine to sitting  Level of Assistance 1: Close supervision  Bed Mobility Comments 1: HOB elevated slightly  Bed Mobility 2  Bed Mobility  2: Sitting to supine  Level of Assistance 2: Close supervision  Bed Mobility Comments 2: manage LEs and trunk with HOB slightly  elevated    Transfers  Transfer: Yes  Transfer 1  Technique 1: Sit to stand, Stand to sit  Transfer Device 1: Walker  Transfer Level of Assistance 1: Close supervision  Trials/Comments 1: to/from EOB with cues on walker mgmt    Functional Mobility:  Functional Mobility  Functional Mobility Performed: Yes  Functional Mobility 1  Surface 1: Level tile  Device 1: Rolling walker  Assistance 1: Close supervision  Comments 1: short household distance within hospital room with FWW and supervision for safety/stability    Sitting Balance:  Static Sitting Balance  Static Sitting-Balance Support: Feet supported, Bilateral upper extremity supported  Static Sitting-Level of Assistance: Close supervision  Static Sitting-Comment/Number of Minutes: EOB sitting    Vision: Vision - Basic Assessment  Current Vision: Does not wear glasses  Sensation:  Light Touch: No apparent deficits (denies numbness/tingling)  Strength:  Strength Comments: BUES at least >/= 3/5, observed functionally  Coordination:  Movements are Fluid and Coordinated: Yes   Hand Function:  Hand Function  Gross Grasp: Functional  Coordination: Functional  Extremities: RUE   RUE : Within Functional Limits and LUE   LUE: Within Functional Limits    Outcome Measures: [facility] Daily Activity  Putting on and taking off regular lower body clothing: A little  Bathing (including washing, rinsing, drying): A little  Putting on and taking off regular upper body clothing: A little  Toileting, which includes using toilet, bedpan or urinal: A little  Taking care of personal grooming such as brushing teeth: A little  Eating Meals: A little  Daily Activity - Total Score: 18      Education Documentation  Body Mechanics, taught by Jorge Luis Jeffery OT at 12/6/2024  9:56 AM.  Learner: Patient  Readiness: Acceptance  Method: Explanation  Response: Needs Reinforcement    Precautions, taught by Jorge Luis Jeffery OT at 12/6/2024  9:56 AM.  Learner: Patient  Readiness: Acceptance  Method:  Explanation  Response: Needs Reinforcement    ADL Training, taught by Jorge Luis Jeffery OT at 12/6/2024  9:56 AM.  Learner: Patient  Readiness: Acceptance  Method: Explanation  Response: Needs Reinforcement    Education Comments  No comments found.      Goals:   Encounter Problems       Encounter Problems (Active)       OT Goals       ADLS (Progressing)       Start:  12/06/24    Expected End:  12/20/24       Patient will complete ADL tasks, with modified independence using AE need in order to increase patient's safety and independence with self-care tasks.           Functional Transfer (Progressing)       Start:  12/06/24    Expected End:  12/20/24       Patient will complete functional transfers with modified independence using least restrictive device, in order to increase patient's safety and independence with daily tasks.           Activity Tolerance (Progressing)       Start:  12/06/24    Expected End:  12/20/24       Patient will demonstrate the ability to participate in functional activity at least >/= 15 minutes in order to increase patient's safety and independence with daily tasks.

## 2024-12-06 NOTE — PROGRESS NOTES
Physical Therapy    Physical Therapy Evaluation    Patient Name: Dianne Clay  MRN: [id number]  Department: [address]  Room: [facility]419-A  Today's Date: 12/6/2024   Time Calculation  Start Time: 0940  Stop Time: 0950  Time Calculation (min): 10 min    Assessment/Plan   PT Assessment  PT Assessment Results: Decreased endurance, Impaired balance, Decreased mobility  Rehab Prognosis: Good  Barriers to Discharge: none  Evaluation/Treatment Tolerance: Patient tolerated treatment well  Medical Staff Made Aware: Yes  End of Session Communication: Bedside nurse  End of Session Patient Position: Bed, 3 rail up, Alarm on      Assessment Comment: 85 y/o female who presented to ED with BRBPR. Pt admitted for further management. Reportedly indpendent with mobility and self care. Is currently limited by generalized weakness and decreased endurance and would benefit from cont PT services at a low frequency while in-house to maximize safe mobility.        IP OR SWING BED PT PLAN  Inpatient or Swing Bed: Inpatient  PT Plan  Treatment/Interventions: Transfer training, Gait training, Stair training, Balance training, Endurance training, Therapeutic activity, Therapeutic exercise  PT Plan: Ongoing PT  PT Frequency: 2 times per week  PT Discharge Recommendations: Low intensity level of continued care  Equipment Recommended upon Discharge:  (has walker at home)  PT Recommended Transfer Status: Stand by assist, Assistive device  PT - OK to Discharge: Yes    Subjective   General Visit Information:  General  Reason for Referral: impaired mobility; BRBPR  Past Medical History Relevant to Rehab: GI bleed, diverticular bleed, ulcers, colitis, afib, HTN  Family/Caregiver Present: No  Prior to Session Communication: Bedside nurse  Patient Position Received: Bed, 3 rail up, Alarm on  Preferred Learning Style: visual, verbal  General Comment: 85 y/o female who presented to ED with BRBPR. Pt supine in bed upon arrival. Agreeable to participate.  Home  Living:  Home Living  Type of Home: House  Lives With: Alone  Home Adaptive Equipment: Walker rolling or standard, Wheelchair-manual, Other (Comment) (rollator)  Home Layout: Multi-level, Able to live on main level with bedroom/bathroom  Home Access: Ramped entrance, Stairs to enter with rails  Entrance Stairs-Number of Steps: 3 MARJORIE vs ramp  Bathroom Shower/Tub: Tub/shower unit  Bathroom Toilet: Standard  Bathroom Equipment: Grab bars in shower, Shower chair with back  Bathroom Accessibility: main level  Prior Level of Function:  Prior Function Per Pt/Caregiver Report  Level of [place]: Independent with ADLs and functional transfers, Independent with homemaking with ambulation  Receives Help From: Neighbor  ADL Assistance: Independent  Homemaking Assistance: Independent  Ambulatory Assistance: Independent  Vocational: Retired  Leisure: taking care of her dog  Prior Function Comments: + drives, active and IND  Precautions:  Precautions  Medical Precautions: Fall precautions        Objective   Pain:  Pain Assessment  Pain Assessment: 0-10  0-10 (Numeric) Pain Score: 0 - No pain  Cognition:  Cognition  Orientation Level: Oriented X4  Cognition Comments: followed commands appropriately. cooperative.    General Assessments:  General Observation  General Observation: pleasant/cooperative     Activity Tolerance  Endurance: Decreased tolerance for upright activites    Sensation  Light Touch: No apparent deficits    Strength  Strength Comments: B LEs > 3/5 observed        Static Sitting Balance  Static Sitting-Level of Assistance: Close supervision    Static Standing Balance  Static Standing-Level of Assistance: Close supervision  Static Standing-Comment/Number of Minutes: with and without UE support  Dynamic Standing Balance  Dynamic Standing-Comments: use of walker during ambulation  Functional Assessments:  Bed Mobility 1  Bed Mobility 1: Supine to sitting, Sitting to supine  Level of Assistance 1: Close  supervision  Bed Mobility Comments 1: HOB elevated    Transfer 1  Technique 1: Sit to stand, Stand to sit  Transfer Device 1: Walker  Transfer Level of Assistance 1: Close supervision  Trials/Comments 1: elevated EOB    Ambulation/Gait Training  Ambulation/Gait Training Performed: Yes  Ambulation/Gait Training 1  Surface 1: Level tile  Device 1: Rolling walker  Assistance 1: Close supervision  Quality of Gait 1:  (decreased evan, flexed posture)  Comments/Distance (ft) 1: > 150 ft  Extremity/Trunk Assessments:  RLE   RLE : Within Functional Limits  LLE   LLE : Within Functional Limits  Outcome Measures:  [facility] Basic Mobility  Turning from your back to your side while in a flat bed without using bedrails: A little  Moving from lying on your back to sitting on the side of a flat bed without using bedrails: A little  Moving to and from bed to chair (including a wheelchair): A little  Standing up from a chair using your arms (e.g. wheelchair or bedside chair): A little  To walk in hospital room: A little  Climbing 3-5 steps with railing: A little  Basic Mobility - Total Score: 18    Encounter Problems       Encounter Problems (Active)       Balance       dynamic (Progressing)       Start:  12/06/24    Expected End:  12/20/24       Pt will perform Tinetti assessment and score >/= 24/28, resulting in a low falls risk             Mobility       LTG - Patient will be able to go up and down a curb/step with the appropriate device (Progressing)       Start:  12/06/24    Expected End:  12/20/24            LTG - Patient will navigate 4-6 steps with rails/device (Progressing)       Start:  12/06/24    Expected End:  12/20/24            ambulation (Progressing)       Start:  12/06/24    Expected End:  12/20/24       Pt will amb > 250  ft with wheeled walker and mod independence             Pain - Adult          Safety       LTG - Patient will utilize safety techniques (Progressing)       Start:  12/06/24    Expected End:   12/20/24                   Education Documentation  Precautions, taught by Patricia Mathew PT at 12/6/2024 12:34 PM.  Learner: Patient  Readiness: Acceptance  Method: Explanation  Response: Verbalizes Understanding    Body Mechanics, taught by Patricia Mathew PT at 12/6/2024 12:34 PM.  Learner: Patient  Readiness: Acceptance  Method: Explanation  Response: Verbalizes Understanding    Mobility Training, taught by Patricia Mathew PT at 12/6/2024 12:34 PM.  Learner: Patient  Readiness: Acceptance  Method: Explanation  Response: Verbalizes Understanding    Education Comments  No comments found.

## 2024-12-06 NOTE — PROGRESS NOTES
Dianne Clay is a 84 y.o. female on day 1 of admission presenting with GI bleed.      Subjective   Patient resting in bed comfortably feels well, no pain or sob. Patient did have a BM this am that was formed black.        Objective     Last Recorded Vitals  /69 (BP Location: Left arm)   Pulse 68   Temp 36.2 °C (97.2 °F) (Temporal)   Resp 16   Wt 54.6 kg (120 lb 5.9 oz)   SpO2 97%   Intake/Output last 3 Shifts:    Intake/Output Summary (Last 24 hours) at 12/6/2024 1505  Last data filed at 12/5/2024 1626  Gross per 24 hour   Intake 240 ml   Output --   Net 240 ml       Admission Weight  Weight: 54 kg (119 lb 0.8 oz) (12/05/24 1135)    Daily Weight  12/05/24 : 54.6 kg (120 lb 5.9 oz)    Image Results  ECG 12 lead  Sinus rhythm with 1st degree AV block with Premature atrial complexes  Right axis deviation  Pulmonary disease pattern  Abnormal ECG  When compared with ECG of 27-NOV-2021 11:14,  Premature atrial complexes are now Present  MN interval has increased  T wave inversion no longer evident in Inferior leads  Nonspecific T wave abnormality no longer evident in Anterior leads  CT angio abdomen pelvis w and or wo IV IV contrast  Narrative: Interpreted By:  Finkelstein, Evan,   STUDY:  CT ANGIO ABDOMEN PELVIS W AND/OR WO IV IV CONTRAST;  12/5/2024 3:46 am      INDICATION:  Signs/Symptoms:gi bleed.          COMPARISON:  CT abdomen 09/05/2024      ACCESSION NUMBER(S):  [id number]      ORDERING CLINICIAN:  CONSTANZA ARMSTRONG      TECHNIQUE:  Noncontrast axial CT imaging of the abdomen and pelvis. Subsequent  CTA imaging of the abdomen and pelvis was obtained after intravenous  administration of  75 mL Omnipaque 350. Sagittal and coronal  reconstructions. 3D reconstructions were obtained at a separate  workstation.      FINDINGS:  Vascular:  No intramural hematoma on noncontrast imaging. Mild  aortoiliac calcifications. No aortic aneurysm or dissection.  The  celiac trunk, superior mesenteric artery, renal arteries  and inferior  mesenteric artery are patent.      LOWER CHEST: The heart is enlarged.      ABDOMEN/PELVIS:      LIVER: 1 cm hypodensity in the left hepatic lobe measures simple  fluid attenuation is most compatible with a simple cyst. There are  subcentimeter hypodensities, which are too small to characterize.  BILE DUCTS: Normal caliber. GALLBLADDER: Surgically absent  SPLEEN: Unremarkable.  PANCREAS: Unremarkable.  ADRENALS: Indeterminate 2 cm left adrenal nodule.  KIDNEYS, URETERS AND BLADDER: Symmetric renal enhancement. No  hydronephrosis.  Bladder is within normal limits REPRODUCTIVE ORGANS:  There are calcified uterine fibroids. No significant free pelvic  fluid.      ABDOMINAL WALL: Within normal limits.      BOWEL: Gastric wall thickening. No small or large bowel dilatation.  Diffuse colonic wall thickening. The appendix is not definitively  visualized, without focal pericecal inflammatory stranding.. No  definite CTA evidence for active intraluminal bleeding. PERITONEUM:  No ascites or free air, no fluid collection.      RETROPERITONEUM: No pathologically enlarged retroperitoneal lymph  nodes.      BONES: No acute osseous abnormality.      Impression: No aortic aneurysm or dissection.      Diffuse colonic wall thickening, which may be seen in the setting of  colitis. No definite CTA evidence for active intraluminal bleeding.      Gastric wall thickening. Correlate for symptoms of gastritis.      Calcified uterine fibroids.      Indeterminate 2 cm left adrenal nodule, which is similar in size  compared to prior recent imaging 09/05/2024. Further characterization  can be obtained with nonemergent MRI..      MACRO:  None.      Signed by: Evan Finkelstein 12/5/2024 4:13 AM  Dictation workstation:   [id number]      Physical Exam  Constitutional:       Appearance: Normal appearance.   Cardiovascular:      Rate and Rhythm: Normal rate and regular rhythm.      Pulses: Normal pulses.      Heart sounds: Normal  heart sounds.   Pulmonary:      Effort: Pulmonary effort is normal.      Breath sounds: Normal breath sounds.   Abdominal:      General: Bowel sounds are normal.      Palpations: Abdomen is soft.   Musculoskeletal:         General: Normal range of motion.   Skin:     General: Skin is warm and dry.   Neurological:      Mental Status: She is alert and oriented to person, place, and time.         Relevant Results             Results for orders placed or performed during the hospital encounter of 12/05/24 (from the past 24 hours)   Comprehensive metabolic panel   Result Value Ref Range    Glucose 89 74 - 99 mg/dL    Sodium 136 136 - 145 mmol/L    Potassium 3.4 (L) 3.5 - 5.3 mmol/L    Chloride 106 98 - 107 mmol/L    Bicarbonate 23 21 - 32 mmol/L    Anion Gap 10 10 - 20 mmol/L    Urea Nitrogen 11 6 - 23 mg/dL    Creatinine 0.72 0.50 - 1.05 mg/dL    eGFR 83 >60 mL/min/1.73m*2    Calcium 8.0 (L) 8.6 - 10.3 mg/dL    Albumin 2.9 (L) 3.4 - 5.0 g/dL    Alkaline Phosphatase 80 33 - 136 U/L    Total Protein 7.6 6.4 - 8.2 g/dL    AST 15 9 - 39 U/L    Bilirubin, Total 0.8 0.0 - 1.2 mg/dL    ALT 8 7 - 45 U/L   CBC   Result Value Ref Range    WBC 6.1 4.4 - 11.3 x10*3/uL    nRBC 0.0 0.0 - 0.0 /100 WBCs    RBC 3.07 (L) 4.00 - 5.20 x10*6/uL    Hemoglobin 8.3 (L) 12.0 - 16.0 g/dL    Hematocrit 26.7 (L) 36.0 - 46.0 %    MCV 87 80 - 100 fL    MCH 27.0 26.0 - 34.0 pg    MCHC 31.1 (L) 32.0 - 36.0 g/dL    RDW 16.1 (H) 11.5 - 14.5 %    Platelets 176 150 - 450 x10*3/uL       Assessment/Plan                  Assessment & Plan  GI bleed  CT showed diffuse colonic wall thickening   GI following; they have advanced her diet and recommended to send stool for studies if she is having diarrhea, monitor H&H and transfuse for<7  Hbg at admission 10.2 today 8.3  H&H in am   History of atrial fibrillation  Continue amiodarone and atenolol  History of hyperthyroidism  Continue methimazole 5 mg  Check TSH    Plan of Care  Patient lives at home alone and  plans to return there at discharge. Anticipatie discharge in next 24 hours.   Plan of care discussed with patient and collaborating physician, Dr. Huston.                 JEREMY Simpson-CNP

## 2024-12-06 NOTE — PROGRESS NOTES
12/06/24 1775   Discharge Planning   Living Arrangements Alone   Support Systems Friends/neighbors  (Sons are out of state, daughter lives in [place] and has kids of her own)   Assistance Needed IADLs - Patient does not drive.  Friends and neighbors provide transportation to appointments and shopping.   Type of Residence Private residence   Number of Stairs to Enter Residence 3  (Also has a ramp entrance)   Number of Stairs Within Residence 13  (Has first floor set up)   Type of Animals or Pets Dog   Home or Post Acute Services None  (Patient denies needs at discharge.)   Expected Discharge Disposition Home   Financial Resource Strain   How hard is it for you to pay for the very basics like food, housing, medical care, and heating? Not hard   Housing Stability   In the last 12 months, was there a time when you were not able to pay the mortgage or rent on time? N   At any time in the past 12 months, were you homeless or living in a shelter (including now)? N   Transportation Needs   In the past 12 months, has lack of transportation kept you from medical appointments or from getting medications? no  (Friends/neighbors assist with transportation)   In the past 12 months, has lack of transportation kept you from meetings, work, or from getting things needed for daily living? No  (Friends/neighbors assist with taking patient shopping)

## 2024-12-06 NOTE — ASSESSMENT & PLAN NOTE
Continue methimazole 5 mg  Check TSH    Plan of Care  Patient lives at home alone and plans to return there at discharge. Anticipatie discharge in next 24 hours.   Plan of care discussed with patient and collaborating physician, Dr. Huston.

## 2024-12-07 VITALS
SYSTOLIC BLOOD PRESSURE: 112 MMHG | RESPIRATION RATE: 17 BRPM | HEART RATE: 62 BPM | BODY MASS INDEX: 21.05 KG/M2 | WEIGHT: 118.83 LBS | HEIGHT: 63 IN | OXYGEN SATURATION: 100 % | TEMPERATURE: 96.8 F | DIASTOLIC BLOOD PRESSURE: 53 MMHG

## 2024-12-07 PROBLEM — K92.2 GI BLEED: Status: RESOLVED | Noted: 2024-09-04 | Resolved: 2024-12-07

## 2024-12-07 PROBLEM — Z86.39 HISTORY OF HYPERTHYROIDISM: Status: RESOLVED | Noted: 2024-12-05 | Resolved: 2024-12-07

## 2024-12-07 PROBLEM — Z86.79 HISTORY OF ATRIAL FIBRILLATION: Status: RESOLVED | Noted: 2024-12-05 | Resolved: 2024-12-07

## 2024-12-07 LAB
ANION GAP SERPL CALCULATED.3IONS-SCNC: 13 MMOL/L (ref 10–20)
BUN SERPL-MCNC: 11 MG/DL (ref 6–23)
CALCIUM SERPL-MCNC: 8.1 MG/DL (ref 8.6–10.3)
CHLORIDE SERPL-SCNC: 105 MMOL/L (ref 98–107)
CO2 SERPL-SCNC: 23 MMOL/L (ref 21–32)
CREAT SERPL-MCNC: 0.75 MG/DL (ref 0.5–1.05)
EGFRCR SERPLBLD CKD-EPI 2021: 79 ML/MIN/1.73M*2
ERYTHROCYTE [DISTWIDTH] IN BLOOD BY AUTOMATED COUNT: 15.9 % (ref 11.5–14.5)
GLUCOSE SERPL-MCNC: 98 MG/DL (ref 74–99)
HCT VFR BLD AUTO: 26.5 % (ref 36–46)
HGB BLD-MCNC: 8.3 G/DL (ref 12–16)
MCH RBC QN AUTO: 26.9 PG (ref 26–34)
MCHC RBC AUTO-ENTMCNC: 31.3 G/DL (ref 32–36)
MCV RBC AUTO: 86 FL (ref 80–100)
NRBC BLD-RTO: 0 /100 WBCS (ref 0–0)
PLATELET # BLD AUTO: 208 X10*3/UL (ref 150–450)
POTASSIUM SERPL-SCNC: 3.5 MMOL/L (ref 3.5–5.3)
RBC # BLD AUTO: 3.08 X10*6/UL (ref 4–5.2)
SODIUM SERPL-SCNC: 137 MMOL/L (ref 136–145)
WBC # BLD AUTO: 6.4 X10*3/UL (ref 4.4–11.3)

## 2024-12-07 PROCEDURE — 2500000001 HC RX 250 WO HCPCS SELF ADMINISTERED DRUGS (ALT 637 FOR MEDICARE OP): Performed by: NURSE PRACTITIONER

## 2024-12-07 PROCEDURE — 99239 HOSP IP/OBS DSCHRG MGMT >30: CPT | Performed by: NURSE PRACTITIONER

## 2024-12-07 PROCEDURE — 80048 BASIC METABOLIC PNL TOTAL CA: CPT | Performed by: NURSE PRACTITIONER

## 2024-12-07 PROCEDURE — 36415 COLL VENOUS BLD VENIPUNCTURE: CPT | Performed by: NURSE PRACTITIONER

## 2024-12-07 PROCEDURE — 82374 ASSAY BLOOD CARBON DIOXIDE: CPT | Performed by: NURSE PRACTITIONER

## 2024-12-07 PROCEDURE — 85027 COMPLETE CBC AUTOMATED: CPT | Performed by: NURSE PRACTITIONER

## 2024-12-07 PROCEDURE — 2500000002 HC RX 250 W HCPCS SELF ADMINISTERED DRUGS (ALT 637 FOR MEDICARE OP, ALT 636 FOR OP/ED): Performed by: NURSE PRACTITIONER

## 2024-12-07 RX ADMIN — FERROUS SULFATE TAB 325 MG (65 MG ELEMENTAL FE) 325 MG: 325 (65 FE) TAB at 08:19

## 2024-12-07 RX ADMIN — METHIMAZOLE 5 MG: 5 TABLET ORAL at 08:19

## 2024-12-07 RX ADMIN — AMIODARONE HYDROCHLORIDE 200 MG: 200 TABLET ORAL at 08:19

## 2024-12-07 RX ADMIN — ATENOLOL 50 MG: 50 TABLET ORAL at 08:19

## 2024-12-07 RX ADMIN — PANTOPRAZOLE SODIUM 40 MG: 40 TABLET, DELAYED RELEASE ORAL at 08:19

## 2024-12-07 RX ADMIN — ATORVASTATIN CALCIUM 40 MG: 40 TABLET, FILM COATED ORAL at 08:19

## 2024-12-07 ASSESSMENT — COGNITIVE AND FUNCTIONAL STATUS - GENERAL
DAILY ACTIVITIY SCORE: 24
MOBILITY SCORE: 23
CLIMB 3 TO 5 STEPS WITH RAILING: A LITTLE

## 2024-12-07 ASSESSMENT — PAIN SCALES - GENERAL: PAINLEVEL_OUTOF10: 0 - NO PAIN

## 2024-12-07 NOTE — PROGRESS NOTES
"Dianne Clay is a 84 y.o. female on day 2 of admission presenting with GI bleed.    Subjective   No bloody stools since yesterday morning        Objective     Physical Exam  Constitutional:       Appearance: Normal appearance.   HENT:      Head: Normocephalic and atraumatic.      Mouth/Throat:      Mouth: Mucous membranes are moist.   Pulmonary:      Effort: Pulmonary effort is normal.   Abdominal:      General: There is no distension.      Palpations: Abdomen is soft.      Tenderness: There is no abdominal tenderness. There is no guarding.   Musculoskeletal:         General: Normal range of motion.      Cervical back: Normal range of motion.   Skin:     General: Skin is warm and dry.   Neurological:      General: No focal deficit present.      Mental Status: She is alert. Mental status is at baseline.   Psychiatric:         Mood and Affect: Mood normal.         Last Recorded Vitals  Blood pressure 151/65, pulse 65, temperature 36.3 °C (97.3 °F), temperature source Temporal, resp. rate 17, height 1.6 m (5' 3\"), weight 53.9 kg (118 lb 13.3 oz), SpO2 100%.  Intake/Output last 3 Shifts:  No intake/output data recorded.    Relevant Results               Results for orders placed or performed during the hospital encounter of 12/05/24 (from the past 24 hours)   CBC   Result Value Ref Range    WBC 6.4 4.4 - 11.3 x10*3/uL    nRBC 0.0 0.0 - 0.0 /100 WBCs    RBC 3.08 (L) 4.00 - 5.20 x10*6/uL    Hemoglobin 8.3 (L) 12.0 - 16.0 g/dL    Hematocrit 26.5 (L) 36.0 - 46.0 %    MCV 86 80 - 100 fL    MCH 26.9 26.0 - 34.0 pg    MCHC 31.3 (L) 32.0 - 36.0 g/dL    RDW 15.9 (H) 11.5 - 14.5 %    Platelets 208 150 - 450 x10*3/uL   Basic Metabolic Panel   Result Value Ref Range    Glucose 98 74 - 99 mg/dL    Sodium 137 136 - 145 mmol/L    Potassium 3.5 3.5 - 5.3 mmol/L    Chloride 105 98 - 107 mmol/L    Bicarbonate 23 21 - 32 mmol/L    Anion Gap 13 10 - 20 mmol/L    Urea Nitrogen 11 6 - 23 mg/dL    Creatinine 0.75 0.50 - 1.05 mg/dL    eGFR 79 " >60 mL/min/1.73m*2    Calcium 8.1 (L) 8.6 - 10.3 mg/dL     ECG 12 lead    Result Date: 12/5/2024  Sinus rhythm with 1st degree AV block with Premature atrial complexes Right axis deviation Pulmonary disease pattern Abnormal ECG When compared with ECG of 27-NOV-2021 11:14, Premature atrial complexes are now Present KS interval has increased T wave inversion no longer evident in Inferior leads Nonspecific T wave abnormality no longer evident in Anterior leads    CT angio abdomen pelvis w and or wo IV IV contrast    Result Date: 12/5/2024  Interpreted By:  Finkelstein, Evan, STUDY: CT ANGIO ABDOMEN PELVIS W AND/OR WO IV IV CONTRAST;  12/5/2024 3:46 am   INDICATION: Signs/Symptoms:gi bleed.     COMPARISON: CT abdomen 09/05/2024   ACCESSION NUMBER(S): [id number]   ORDERING CLINICIAN: CONSTANZA ARMSTRONG   TECHNIQUE: Noncontrast axial CT imaging of the abdomen and pelvis. Subsequent CTA imaging of the abdomen and pelvis was obtained after intravenous administration of  75 mL Omnipaque 350. Sagittal and coronal reconstructions. 3D reconstructions were obtained at a separate workstation.   FINDINGS: Vascular:  No intramural hematoma on noncontrast imaging. Mild aortoiliac calcifications. No aortic aneurysm or dissection.  The celiac trunk, superior mesenteric artery, renal arteries and inferior mesenteric artery are patent.   LOWER CHEST: The heart is enlarged.   ABDOMEN/PELVIS:   LIVER: 1 cm hypodensity in the left hepatic lobe measures simple fluid attenuation is most compatible with a simple cyst. There are subcentimeter hypodensities, which are too small to characterize. BILE DUCTS: Normal caliber. GALLBLADDER: Surgically absent SPLEEN: Unremarkable. PANCREAS: Unremarkable. ADRENALS: Indeterminate 2 cm left adrenal nodule. KIDNEYS, URETERS AND BLADDER: Symmetric renal enhancement. No hydronephrosis.  Bladder is within normal limits REPRODUCTIVE ORGANS: There are calcified uterine fibroids. No significant free pelvic fluid.    ABDOMINAL WALL: Within normal limits.   BOWEL: Gastric wall thickening. No small or large bowel dilatation. Diffuse colonic wall thickening. The appendix is not definitively visualized, without focal pericecal inflammatory stranding.. No definite CTA evidence for active intraluminal bleeding. PERITONEUM: No ascites or free air, no fluid collection.   RETROPERITONEUM: No pathologically enlarged retroperitoneal lymph nodes.   BONES: No acute osseous abnormality.       No aortic aneurysm or dissection.   Diffuse colonic wall thickening, which may be seen in the setting of colitis. No definite CTA evidence for active intraluminal bleeding.   Gastric wall thickening. Correlate for symptoms of gastritis.   Calcified uterine fibroids.   Indeterminate 2 cm left adrenal nodule, which is similar in size compared to prior recent imaging 09/05/2024. Further characterization can be obtained with nonemergent MRI..   MACRO: None.   Signed by: Evan Finkelstein 12/5/2024 4:13 AM Dictation workstation:   [id number]                  Assessment/Plan   Assessment & Plan  GI bleed    History of atrial fibrillation    History of hyperthyroidism    Rectal Hemorrhage, Anemia, Abnormal CT   Sudden onset painless red bloody stools with clots. She has hx prior diverticular bleeding in 2024, 2023, 2022, and 2014. Presumed diverticular. Recent colonoscopy Sept 2024 did not some colonic inflammation. This diffuse bowel wall thickening was new on imaging yesterday. No plan for repeat colonoscopy as she just had one in Sept. She is poor candidate for anticoagulation from GI standpoint. Consider watchmans               -OK for low fiber diet               -Monitor HH, transfuse for hgb 7               -If having diarrhea, send stool studies given new diffuse colonic wall thickening    12/7  No bloody stools since yesterday morning. HH stable. Tolerating low fiber diet. No barriers to DC from GI standpoint. This was likely recurrent diverticular  bleed     She is poor candidate for anticoagulation from GI standpoint. This is her 5th diverticular bleed        I spent 20 minutes in the professional and overall care of this patient.      Karely Conley, APRN-CNP

## 2024-12-07 NOTE — CARE PLAN
Problem: Pain - Adult  Goal: Verbalizes/displays adequate comfort level or baseline comfort level  Outcome: Progressing     Problem: Safety - Adult  Goal: Free from fall injury  Outcome: Progressing     Problem: Discharge Planning  Goal: Discharge to home or other facility with appropriate resources  Outcome: Progressing     Problem: Chronic Conditions and Co-morbidities  Goal: Patient's chronic conditions and co-morbidity symptoms are monitored and maintained or improved  Outcome: Progressing   The patient's goals for the shift include      The clinical goals for the shift include keep pt comfortable

## 2024-12-07 NOTE — DISCHARGE SUMMARY
Discharge Diagnosis  GI bleed    Issues Requiring Follow-Up  Follow-up with GI dr and PCP   CBC on Monday     Discharge Meds     Medication List      CONTINUE taking these medications     amiodarone 200 mg tablet; Commonly known as: Pacerone; Take 1 tablet by   mouth once daily with a meal.   atorvastatin 40 mg tablet; Commonly known as: Lipitor; Take 1 tablet (40   mg) by mouth once daily.   FeroSuL tablet; Generic drug: ferrous sulfate (325 mg ferrous sulfate);   TAKE 1 TABLET BY MOUTH DAILY WITH FOOD   methIMAzole 5 mg tablet; Commonly known as: Tapazole; TAKE 1 TABLET BY   MOUTH ONCE DAILY.   pantoprazole 40 mg EC tablet; Commonly known as: ProtoNix; TAKE 1 TABLET   BY MOUTH ONCE DAILY     STOP taking these medications     apixaban 2.5 mg tablet; Commonly known as: Eliquis   atenolol 50 mg tablet; Commonly known as: Tenormin   Voltaren Arthritis Pain 1 % gel; Generic drug: diclofenac sodium       Test Results Pending At Discharge  Pending Labs       No current pending labs.            Hospital Course  From [facility] on admission:   Dianne Clay is a 84 y.o. female who presented with bright red from rectum with dark black stools. Patient has an extensive GI history of diverticular bleed, ulcers, colitis and rectal bleeding. Patient was taking Eliquis because of a history of paroxsymal atrial fib until October 2024. She is currently in sinus rhythm.  Patient has not had any dizziness fatigue or falling. She has been symptomatic from blood GI blood loss in the past.      Brief Hospital Course:   This is an 84 yr. female with History of diverticular bleeding, ulcers, colitis and rectal bleeding in 2024, 2023, 2022, and 2014. Patient presented on 12/5 with bright red from rectum with dark black stools. Admitted due to recurrent diverticular bleed.  Last colonoscopy on Sept 2024 did not have some colonic inflammation. This diffuse bowel wall thickening was new on the imaging. Patient was taking Eliquis due to atrial fib,  until October 2024. She is poor candidate for anticoagulation from GI standpoint. No bloody stools since yesterday morning. HH stable. Tolerating well her diet.  Patient is aware to come back to the hospital if she has any rectal bleed.  Patient will discharge home with a prescription of labs CBC on Monday.  Results of CBC  will be faxed to her primary doctor and or GI doctor.   Patient is hemodynamically stable  Pertinent Physical Exam At Time of Discharge  Physical Exam  General: alert, no diaphoresis   HENT: mucous membranes moist, external ears normal, no rhinorrhea   Eyes: no icterus or injection, no discharge   Lungs: CTA BL   Heart: RRR, no murmurs, no LE edema BL   GI: abdomen soft, nontender, nondistended, BS present   MSK: no joint effusion or deformity   Skin: no rashes, erythema, or ecchymosis   Neuro: grossly normal cognition, motor strength, sensation    Outpatient Follow-Up  Future Appointments   Date Time Provider Department Center   1/13/2025 10:40 AM Flavio Queen MD [id number]     I spent 35 minutes    JEREMY Hutchinson-CNP

## 2024-12-09 ENCOUNTER — APPOINTMENT (OUTPATIENT)
Dept: CARDIOLOGY | Facility: CLINIC | Age: 84
End: 2024-12-09
Payer: MEDICARE

## 2024-12-10 NOTE — DOCUMENTATION CLARIFICATION NOTE
"    PATIENT:               JUANIS CASTRO  ACCT #:                  [id number]  MRN:                       [id number]  :                       1940  ADMIT DATE:       2024 11:07 AM  DISCH DATE:        2024 1:34 PM  RESPONDING PROVIDER #:        [id number]          PROVIDER RESPONSE TEXT:    Acute blood loss anemia    CDI QUERY TEXT:    Clarification      Instruction:    Based on your assessment of the patient and the clinical information, please provide the requested documentation by clicking on the appropriate radio button and enter any additional information if prompted.    Question: Please further clarify the diagnosis of anemia as    When answering this query, please exercise your independent professional judgment. The fact that a question is being asked, does not imply that any particular answer is desired or expected.    The patient's clinical indicators include:  Clinical Information:  84 year old female presented with bloody stools and clots.    Clinical Indicators:  Labs- H/H-  - 10.2/33.2      -  8.3/36.7  24- H/P- \"Patient has an extensive GI history of diverticular bleed, ulcers, colitis and rectal bleeding. Patient was taking Eliquis because of a history of paroxsymal atrial fib until 2024.She has been symptomatic from blood GI blood loss in the past\".  24- GI Consult- \" Rectal Hemorrhage, Anemia. Sudden onset painless red bloody stools with clots. She has hx prior diverticular bleeding in , , , and . Presumed diverticular\".  24- GI PN- \"No bloody stools since yesterday morning. HH stable.  This was likely recurrent diverticular bleed\".    Treatment:  GI Consult  Monitor H/H  Transfuse for HGB < 7  Clear liquids    Risk Factors:  Home medication- Eliquis  History: diverticular bleed  Options provided:  -- Acute blood loss anemia  -- Other - I will add my own diagnosis  -- Refer to Clinical Documentation Reviewer    Query created by: Dinah Donohue " on 12/6/2024 3:17 PM      Electronically signed by:  MARTA ALTAMIRANO 12/10/2024 3:56 PM

## 2024-12-14 LAB
ATRIAL RATE: 66 BPM
P AXIS: 82 DEGREES
P OFFSET: 158 MS
P ONSET: 117 MS
PR INTERVAL: 214 MS
Q ONSET: 224 MS
QRS COUNT: 11 BEATS
QRS DURATION: 88 MS
QT INTERVAL: 466 MS
QTC CALCULATION(BAZETT): 488 MS
QTC FREDERICIA: 481 MS
R AXIS: 157 DEGREES
T AXIS: 70 DEGREES
T OFFSET: 457 MS
VENTRICULAR RATE: 66 BPM

## 2024-12-15 DIAGNOSIS — I10 ESSENTIAL (PRIMARY) HYPERTENSION: ICD-10-CM

## 2024-12-18 RX ORDER — ATORVASTATIN CALCIUM 40 MG/1
40 TABLET, FILM COATED ORAL DAILY
Qty: 30 TABLET | Refills: 0 | Status: SHIPPED | OUTPATIENT
Start: 2024-12-18

## 2024-12-30 DIAGNOSIS — D50.9 IRON DEFICIENCY ANEMIA, UNSPECIFIED IRON DEFICIENCY ANEMIA TYPE: ICD-10-CM

## 2024-12-30 DIAGNOSIS — E05.90 THYROTOXICOSIS, UNSPECIFIED WITHOUT THYROTOXIC CRISIS OR STORM: ICD-10-CM

## 2024-12-30 RX ORDER — METHIMAZOLE 5 MG/1
5 TABLET ORAL DAILY
Qty: 30 TABLET | Refills: 8 | Status: SHIPPED | OUTPATIENT
Start: 2024-12-30

## 2024-12-30 RX ORDER — FERROUS SULFATE 325(65) MG
1 TABLET ORAL DAILY
Qty: 90 TABLET | Refills: 2 | Status: SHIPPED | OUTPATIENT
Start: 2024-12-30

## 2025-01-08 DIAGNOSIS — I10 ESSENTIAL (PRIMARY) HYPERTENSION: ICD-10-CM

## 2025-01-09 RX ORDER — ATORVASTATIN CALCIUM 40 MG/1
40 TABLET, FILM COATED ORAL DAILY
Qty: 30 TABLET | Refills: 0 | Status: SHIPPED | OUTPATIENT
Start: 2025-01-09

## 2025-01-13 ENCOUNTER — APPOINTMENT (OUTPATIENT)
Dept: CARDIOLOGY | Facility: CLINIC | Age: 85
End: 2025-01-13
Payer: MEDICARE

## 2025-01-13 VITALS
HEART RATE: 83 BPM | SYSTOLIC BLOOD PRESSURE: 183 MMHG | WEIGHT: 115 LBS | DIASTOLIC BLOOD PRESSURE: 86 MMHG | BODY MASS INDEX: 20.38 KG/M2 | OXYGEN SATURATION: 97 % | HEIGHT: 63 IN

## 2025-01-13 DIAGNOSIS — E44.1 MILD PROTEIN-CALORIE MALNUTRITION (MULTI): ICD-10-CM

## 2025-01-13 DIAGNOSIS — K91.2 POSTSURGICAL MALABSORPTION, NOT ELSEWHERE CLASSIFIED: ICD-10-CM

## 2025-01-13 DIAGNOSIS — K57.31 DIVERTICULAR HEMORRHAGE: ICD-10-CM

## 2025-01-13 DIAGNOSIS — I25.10 CAD (CORONARY ARTERY DISEASE): ICD-10-CM

## 2025-01-13 DIAGNOSIS — I48.21 PERMANENT ATRIAL FIBRILLATION (MULTI): Primary | ICD-10-CM

## 2025-01-13 DIAGNOSIS — I11.0 HYPERTENSIVE HEART DISEASE WITH HEART FAILURE: ICD-10-CM

## 2025-01-13 PROCEDURE — 3077F SYST BP >= 140 MM HG: CPT | Performed by: INTERNAL MEDICINE

## 2025-01-13 PROCEDURE — 99205 OFFICE O/P NEW HI 60 MIN: CPT | Performed by: INTERNAL MEDICINE

## 2025-01-13 PROCEDURE — 1159F MED LIST DOCD IN RCRD: CPT | Performed by: INTERNAL MEDICINE

## 2025-01-13 PROCEDURE — 3079F DIAST BP 80-89 MM HG: CPT | Performed by: INTERNAL MEDICINE

## 2025-01-13 PROCEDURE — G2211 COMPLEX E/M VISIT ADD ON: HCPCS | Performed by: INTERNAL MEDICINE

## 2025-01-13 NOTE — PROGRESS NOTES
Primary Care Physician: Jyoti Carbajal DO      Date of Visit: 01/13/2025 10:40 AM EST  Location of visit:  [facility]   Type of Visit: New Patient    Dear Dr. Carbajal,    DIAGNOSES: CAD.  S/p PCI (2003. [facility]).  Class II angina.  Preserved LV systolic function.  PAF, maintained on SR on amiodarone.  HTN. History of GI bleed -not on oral anticoagulation.  Hyperthyroidism on methimazole.       Chief Complaint   Patient presents with    New Patient Visit     Here to discuss a watchman, and A-fib       HPI / Summary:   Dianne Clay is a 84 y.o. female who presents to establish cardiac care.  She has a long history of cardiac ailments, having undergone what appears to be a pericardial surgery for pericardial effusion in mid 80s.  Subsequently she had a heart attack in 2003, being life flighted from [facility] to [facility] where she underwent stent placement.  Over the last 2 to 3 years she has history of paroxysmal atrial fibrillation, currently maintained on sinus rhythm on amiodarone.  She was orally anticoagulated with apixaban, which was discontinued due to chronic bleed.  She had undergone multiple scope's for the same.  She does complain of exertional chest discomfort especially climbing up stairs.  She lives alone.  Today she is accompanied by her son, Naveen, who is visiting from [place].  She also has osteoarthrosis.         12 system review is negative except as noted above  Accompanied by her son, Naveen who contributed to the history     Medical History:   Past Medical History:   Diagnosis Date    Adrenal nodule     Allergic contact dermatitis, unspecified cause 11/07/2015    Allergic dermatitis    Anemia     Arthritis     Atrial fibrillation, chronic (Multi)     Contusion of unspecified knee, initial encounter 07/03/2018    Knee contusion    Diverticular hemorrhage     Effusion, unspecified knee 03/06/2019    Knee swelling    GERD (gastroesophageal reflux disease)     Heart attack     Hypertension      Hyperthyroidism     Noninfective gastroenteritis and colitis, unspecified 2022    Enteritis    Noninfective gastroenteritis and colitis, unspecified     Colitis    Other conditions influencing health status     Ulcer    PAD (peripheral artery disease) ([facility])     Pain in left knee 2014    Knee pain, left anterior    Personal history of other diseases of the musculoskeletal system and connective tissue     Personal history of arthritis    Pneumonia     Rectal bleeding     Rectal ulcer     S/P primary angioplasty with coronary stent        Surgical History:   Past Surgical History:   Procedure Laterality Date    CATARACT EXTRACTION  2014    Cataract Surgery     SECTION, CLASSIC  2014     Section    COLONOSCOPY  2014    Colonoscopy (Fiberoptic)    CORONARY ANGIOPLASTY WITH STENT PLACEMENT  2014    Cath Stent Placement    FLEXIBLE SIGMOIDOSCOPY      OTHER SURGICAL HISTORY  2023    Cholecystectomy laparoscopic    UPPER GASTROINTESTINAL ENDOSCOPY      UPPER GASTROINTESTINAL ENDOSCOPY      UPPER GASTROINTESTINAL ENDOSCOPY             Family History:   Family History   Problem Relation Name Age of Onset    Other (cardiac disorder) Mother      Other (cva) Mother      Other (cardiac disorder) Father      Other (cardiac disorder) Sister      Cancer Sister      Other (MI) Sister         Social History:   Tobacco Use: Low Risk  (2025)    Patient History     Smoking Tobacco Use: Never     Smokeless Tobacco Use: Never     Passive Exposure: Never             MEDICATIONS:   Current Outpatient Medications   Medication Instructions    amiodarone (Pacerone) 200 mg tablet Take 1 tablet by mouth once daily with a meal.    atorvastatin (LIPITOR) 40 mg, oral, Daily    FeroSuL 325 mg, oral, Daily, Take with food.    methIMAzole (TAPAZOLE) 5 mg, oral, Daily    pantoprazole (ProtoNix) 40 mg EC tablet TAKE 1 TABLET BY MOUTH ONCE DAILY         IMAGING REPORTS INDEPENDENTLY  "REVIEWED: Yes questions      LABS:  CBC:   Lab Results   Component Value Date    WBC 6.4 12/07/2024    RBC 3.08 (L) 12/07/2024    HGB 8.3 (L) 12/07/2024    HCT 26.5 (L) 12/07/2024    MCV 86 12/07/2024    MCH 26.9 12/07/2024    MCHC 31.3 (L) 12/07/2024    RDW 15.9 (H) 12/07/2024     12/07/2024     CBC with Differential:    Lab Results   Component Value Date    WBC 6.4 12/07/2024    RBC 3.08 (L) 12/07/2024    HGB 8.3 (L) 12/07/2024    HCT 26.5 (L) 12/07/2024     12/07/2024    MCV 86 12/07/2024    MCH 26.9 12/07/2024    MCHC 31.3 (L) 12/07/2024    RDW 15.9 (H) 12/07/2024    NRBC 0.0 12/07/2024    LYMPHOPCT 18.2 09/04/2024    MONOPCT 6.0 09/04/2024    EOSPCT 0.5 09/04/2024    BASOPCT 0.4 09/04/2024    MONOSABS 0.72 09/04/2024    LYMPHSABS 2.17 09/04/2024    EOSABS 0.06 09/04/2024    BASOSABS 0.05 09/04/2024     CMP:    Lab Results   Component Value Date     12/07/2024    K 3.5 12/07/2024     12/07/2024    CO2 23 12/07/2024    BUN 11 12/07/2024    CREATININE 0.75 12/07/2024    GLUCOSE 98 12/07/2024    PROT 7.6 12/06/2024    CALCIUM 8.1 (L) 12/07/2024    BILITOT 0.8 12/06/2024    ALKPHOS 80 12/06/2024    AST 15 12/06/2024    ALT 8 12/06/2024     BMP:    Lab Results   Component Value Date     12/07/2024    K 3.5 12/07/2024     12/07/2024    CO2 23 12/07/2024    BUN 11 12/07/2024    CREATININE 0.75 12/07/2024    CALCIUM 8.1 (L) 12/07/2024    GLUCOSE 98 12/07/2024     Magnesium:  Lab Results   Component Value Date    MG 1.83 11/29/2021     Troponin:  No results found for: \"TROPHS\"  BNP:   Lab Results   Component Value Date     (H) 11/26/2021       Lipid Panel:  Lab Results   Component Value Date    HDL 56.3 11/21/2022    CHHDL 2.9 11/21/2022    VLDL 15 11/21/2022    TRIG 75 11/21/2022        Lab work and imaging results independently reviewed by me         Visit Vitals  BP (!) 183/86   Pulse 83   Ht 1.6 m (5' 3\")   Wt 52.2 kg (115 lb)   LMP  (LMP Unknown)   SpO2 97%   BMI 20.37 " kg/m²   OB Status Postmenopausal   Smoking Status Never   BSA 1.52 m²          ECG dated 1/13/2025 independently reviewed   Sinus rhythm with occasional PACs.      Physical Exam  On examination, she was comfortably sitting.  HEENT: normal, Neck: supple, Carotids: brisk upstroke, JVP: normal, CVS: Rate and rhythm regular, S1S2, Chest: Emphysematous.  CTAB, Abdomen: soft nontender, no organomegaly appreciated, Extremities: without any edema, peripheral pulses normal. CNS: HMF normal, no focal neurological deficit.    DIAGNOSES: CAD.  Status post PCI (2003. [facility]).  Class II angina.  Preserved LV systolic function.  PAF, maintained on SR on amiodarone.  HTN. History of GI bleed -not on oral anticoagulation.  Hyperthyroidism on methimazole.      I had a detailed discussion with her regarding the nature of her illness.  In view of her symptoms of exertional angina doing activities of daily living, she needs cardiac catheterization to delineate coronary anatomy as well as right heart pressures, especially her prior echo having been reported as having mild to moderate pulmonary hypertension.  I requested routine labs and another echocardiogram.  I also discussed at length regarding management of atrial fibrillation, rate versus rhythm control, ablation procedure as well as left atrial appendage occlusion, especially in view of her history of gastrointestinal bleeding.  We will also need endocrinology assessment in view of her hyperthyroidism in the face of amiodarone therapy.  They are willing to go through the procedures and we will schedule the same accordingly.    Thank you so much for the opportunity to participate in the care of this very pleasant lady. Please do not hesitate to contact me if I could be of any assistance in her future care.    Sincerely        Siddhartha Queen M.D.                01/13/25 at 11:25 AM - Flavio Queen MD        Followup Appts:  No future appointments.

## 2025-01-14 ENCOUNTER — LAB (OUTPATIENT)
Dept: LAB | Facility: LAB | Age: 85
End: 2025-01-14
Payer: MEDICARE

## 2025-01-14 ENCOUNTER — HOSPITAL ENCOUNTER (OUTPATIENT)
Dept: CARDIOLOGY | Facility: HOSPITAL | Age: 85
Discharge: HOME | End: 2025-01-14
Payer: MEDICARE

## 2025-01-14 DIAGNOSIS — K57.31 DIVERTICULAR HEMORRHAGE: ICD-10-CM

## 2025-01-14 DIAGNOSIS — I48.21 PERMANENT ATRIAL FIBRILLATION (MULTI): ICD-10-CM

## 2025-01-14 DIAGNOSIS — E44.1 MILD PROTEIN-CALORIE MALNUTRITION (MULTI): ICD-10-CM

## 2025-01-14 DIAGNOSIS — K91.2 POSTSURGICAL MALABSORPTION, NOT ELSEWHERE CLASSIFIED: ICD-10-CM

## 2025-01-14 DIAGNOSIS — I11.0 HYPERTENSIVE HEART DISEASE WITH HEART FAILURE: ICD-10-CM

## 2025-01-14 PROBLEM — I25.10 CAD (CORONARY ARTERY DISEASE): Status: ACTIVE | Noted: 2025-01-13

## 2025-01-14 LAB
ALBUMIN SERPL BCP-MCNC: 3.4 G/DL (ref 3.4–5)
ALP SERPL-CCNC: 99 U/L (ref 33–136)
ALT SERPL W P-5'-P-CCNC: 12 U/L (ref 7–45)
ANION GAP SERPL CALC-SCNC: 14 MMOL/L (ref 10–20)
AORTIC VALVE PEAK VELOCITY: 1.75 M/S
AST SERPL W P-5'-P-CCNC: 29 U/L (ref 9–39)
AV PEAK GRADIENT: 12 MMHG
AVA (PEAK VEL): 1.83 CM2
BASOPHILS # BLD AUTO: 0.05 X10*3/UL (ref 0–0.1)
BASOPHILS NFR BLD AUTO: 0.8 %
BILIRUB SERPL-MCNC: 0.9 MG/DL (ref 0–1.2)
BNP SERPL-MCNC: 101 PG/ML (ref 0–99)
BUN SERPL-MCNC: 12 MG/DL (ref 6–23)
CALCIUM SERPL-MCNC: 9 MG/DL (ref 8.6–10.3)
CHLORIDE SERPL-SCNC: 104 MMOL/L (ref 98–107)
CHOLEST SERPL-MCNC: 141 MG/DL (ref 0–199)
CHOLESTEROL/HDL RATIO: 2.7
CO2 SERPL-SCNC: 27 MMOL/L (ref 21–32)
CREAT SERPL-MCNC: 0.81 MG/DL (ref 0.5–1.05)
EGFRCR SERPLBLD CKD-EPI 2021: 72 ML/MIN/1.73M*2
EJECTION FRACTION APICAL 4 CHAMBER: 62.5
EJECTION FRACTION: 58 %
EOSINOPHIL # BLD AUTO: 0.08 X10*3/UL (ref 0–0.4)
EOSINOPHIL NFR BLD AUTO: 1.3 %
ERYTHROCYTE [DISTWIDTH] IN BLOOD BY AUTOMATED COUNT: 18 % (ref 11.5–14.5)
EST. AVERAGE GLUCOSE BLD GHB EST-MCNC: 97 MG/DL
GLUCOSE SERPL-MCNC: 96 MG/DL (ref 74–99)
HBA1C MFR BLD: 5 %
HCT VFR BLD AUTO: 37.5 % (ref 36–46)
HDLC SERPL-MCNC: 51.3 MG/DL
HGB BLD-MCNC: 11.1 G/DL (ref 12–16)
IMM GRANULOCYTES # BLD AUTO: 0.02 X10*3/UL (ref 0–0.5)
IMM GRANULOCYTES NFR BLD AUTO: 0.3 % (ref 0–0.9)
INR PPP: 1.1 (ref 0.9–1.1)
LEFT ATRIUM VOLUME AREA LENGTH INDEX BSA: 29.6 ML/M2
LEFT VENTRICLE INTERNAL DIMENSION DIASTOLE: 3.73 CM (ref 3.5–6)
LEFT VENTRICULAR OUTFLOW TRACT DIAMETER: 1.9 CM
LYMPHOCYTES # BLD AUTO: 1.34 X10*3/UL (ref 0.8–3)
LYMPHOCYTES NFR BLD AUTO: 22.6 %
MAGNESIUM SERPL-MCNC: 2.1 MG/DL (ref 1.6–2.4)
MCH RBC QN AUTO: 26.9 PG (ref 26–34)
MCHC RBC AUTO-ENTMCNC: 29.6 G/DL (ref 32–36)
MCV RBC AUTO: 91 FL (ref 80–100)
MITRAL VALVE E/A RATIO: 0.83
MONOCYTES # BLD AUTO: 0.49 X10*3/UL (ref 0.05–0.8)
MONOCYTES NFR BLD AUTO: 8.2 %
NEUTROPHILS # BLD AUTO: 3.96 X10*3/UL (ref 1.6–5.5)
NEUTROPHILS NFR BLD AUTO: 66.8 %
NON-HDL CHOLESTEROL: 90 MG/DL (ref 0–149)
NRBC BLD-RTO: 0 /100 WBCS (ref 0–0)
PLATELET # BLD AUTO: 183 X10*3/UL (ref 150–450)
POTASSIUM SERPL-SCNC: 4.7 MMOL/L (ref 3.5–5.3)
PROT SERPL-MCNC: 9.8 G/DL (ref 6.4–8.2)
PROTHROMBIN TIME: 12.5 SECONDS (ref 9.8–12.8)
RBC # BLD AUTO: 4.13 X10*6/UL (ref 4–5.2)
RBC MORPH BLD: NORMAL
RIGHT VENTRICLE FREE WALL PEAK S': 11.4 CM/S
RIGHT VENTRICLE PEAK SYSTOLIC PRESSURE: 21.1 MMHG
SODIUM SERPL-SCNC: 140 MMOL/L (ref 136–145)
TRICUSPID ANNULAR PLANE SYSTOLIC EXCURSION: 1.7 CM
TSH SERPL-ACNC: 0.51 MIU/L (ref 0.44–3.98)
URATE SERPL-MCNC: 3.6 MG/DL (ref 2.3–6.7)
WBC # BLD AUTO: 5.9 X10*3/UL (ref 4.4–11.3)

## 2025-01-14 PROCEDURE — 83036 HEMOGLOBIN GLYCOSYLATED A1C: CPT

## 2025-01-14 PROCEDURE — 93306 TTE W/DOPPLER COMPLETE: CPT | Performed by: INTERNAL MEDICINE

## 2025-01-14 PROCEDURE — 93306 TTE W/DOPPLER COMPLETE: CPT

## 2025-01-15 ENCOUNTER — HOSPITAL ENCOUNTER (OUTPATIENT)
Dept: CARDIOLOGY | Facility: HOSPITAL | Age: 85
Discharge: HOME | End: 2025-01-15
Payer: MEDICARE

## 2025-01-15 DIAGNOSIS — I48.21 PERMANENT ATRIAL FIBRILLATION (MULTI): ICD-10-CM

## 2025-01-15 DIAGNOSIS — K57.31 DIVERTICULAR HEMORRHAGE: ICD-10-CM

## 2025-01-15 LAB
ATRIAL RATE: 83 BPM
P AXIS: 88 DEGREES
P OFFSET: 173 MS
P ONSET: 127 MS
PR INTERVAL: 192 MS
Q ONSET: 223 MS
QRS COUNT: 14 BEATS
QRS DURATION: 96 MS
QT INTERVAL: 422 MS
QTC CALCULATION(BAZETT): 495 MS
QTC FREDERICIA: 470 MS
R AXIS: 162 DEGREES
T AXIS: 43 DEGREES
T OFFSET: 434 MS
VENTRICULAR RATE: 83 BPM

## 2025-01-15 PROCEDURE — 93005 ELECTROCARDIOGRAM TRACING: CPT

## 2025-01-29 ENCOUNTER — TELEPHONE (OUTPATIENT)
Dept: CARDIOLOGY | Facility: CLINIC | Age: 85
End: 2025-01-29
Payer: MEDICARE

## 2025-01-29 NOTE — TELEPHONE ENCOUNTER
Spoke to patient re; heart cath. Instructed patient NPO after midnight, may take medications with a little sip of water that morning of procedure. Pt had bloodwork done. Pt verbalized understanding of all directions. Pt is to arrange for a  to accompany to the procedure.

## 2025-03-04 ENCOUNTER — HOSPITAL ENCOUNTER (OUTPATIENT)
Facility: HOSPITAL | Age: 85
Setting detail: OUTPATIENT SURGERY
Discharge: HOME | End: 2025-03-04
Attending: INTERNAL MEDICINE | Admitting: INTERNAL MEDICINE
Payer: MEDICARE

## 2025-03-04 VITALS
BODY MASS INDEX: 20.91 KG/M2 | TEMPERATURE: 97.7 F | OXYGEN SATURATION: 97 % | SYSTOLIC BLOOD PRESSURE: 142 MMHG | DIASTOLIC BLOOD PRESSURE: 77 MMHG | HEIGHT: 63 IN | HEART RATE: 80 BPM | WEIGHT: 118 LBS | RESPIRATION RATE: 18 BRPM

## 2025-03-04 DIAGNOSIS — R06.02 SHORTNESS OF BREATH: ICD-10-CM

## 2025-03-04 DIAGNOSIS — I25.10 CAD (CORONARY ARTERY DISEASE): Primary | ICD-10-CM

## 2025-03-04 DIAGNOSIS — R06.00 DYSPNEA, UNSPECIFIED: ICD-10-CM

## 2025-03-04 DIAGNOSIS — I48.91 ATRIAL FIBRILLATION (MULTI): ICD-10-CM

## 2025-03-04 LAB
ANION GAP SERPL CALC-SCNC: 15 MMOL/L (ref 10–20)
BUN SERPL-MCNC: 14 MG/DL (ref 6–23)
CALCIUM SERPL-MCNC: 8.7 MG/DL (ref 8.6–10.3)
CHLORIDE SERPL-SCNC: 102 MMOL/L (ref 98–107)
CO2 SERPL-SCNC: 23 MMOL/L (ref 21–32)
CREAT SERPL-MCNC: 0.76 MG/DL (ref 0.5–1.05)
EGFRCR SERPLBLD CKD-EPI 2021: 77 ML/MIN/1.73M*2
ERYTHROCYTE [DISTWIDTH] IN BLOOD BY AUTOMATED COUNT: 17.2 % (ref 11.5–14.5)
GLUCOSE SERPL-MCNC: 86 MG/DL (ref 74–99)
HCT VFR BLD AUTO: 35.8 % (ref 36–46)
HGB BLD-MCNC: 11.1 G/DL (ref 12–16)
MCH RBC QN AUTO: 26.1 PG (ref 26–34)
MCHC RBC AUTO-ENTMCNC: 31 G/DL (ref 32–36)
MCV RBC AUTO: 84 FL (ref 80–100)
NRBC BLD-RTO: 0 /100 WBCS (ref 0–0)
PLATELET # BLD AUTO: 226 X10*3/UL (ref 150–450)
POTASSIUM SERPL-SCNC: 4.1 MMOL/L (ref 3.5–5.3)
RBC # BLD AUTO: 4.26 X10*6/UL (ref 4–5.2)
SODIUM SERPL-SCNC: 136 MMOL/L (ref 136–145)
WBC # BLD AUTO: 6.7 X10*3/UL (ref 4.4–11.3)

## 2025-03-04 PROCEDURE — 85027 COMPLETE CBC AUTOMATED: CPT | Performed by: NURSE PRACTITIONER

## 2025-03-04 PROCEDURE — 96373 THER/PROPH/DIAG INJ IA: CPT | Performed by: INTERNAL MEDICINE

## 2025-03-04 PROCEDURE — 2780000003 HC OR 278 NO HCPCS: Performed by: INTERNAL MEDICINE

## 2025-03-04 PROCEDURE — 2500000005 HC RX 250 GENERAL PHARMACY W/O HCPCS: Performed by: INTERNAL MEDICINE

## 2025-03-04 PROCEDURE — 7100000009 HC PHASE TWO TIME - INITIAL BASE CHARGE: Performed by: INTERNAL MEDICINE

## 2025-03-04 PROCEDURE — 82374 ASSAY BLOOD CARBON DIOXIDE: CPT | Performed by: NURSE PRACTITIONER

## 2025-03-04 PROCEDURE — 93460 R&L HRT ART/VENTRICLE ANGIO: CPT | Performed by: INTERNAL MEDICINE

## 2025-03-04 PROCEDURE — 2500000001 HC RX 250 WO HCPCS SELF ADMINISTERED DRUGS (ALT 637 FOR MEDICARE OP): Performed by: NURSE PRACTITIONER

## 2025-03-04 PROCEDURE — C1887 CATHETER, GUIDING: HCPCS | Performed by: INTERNAL MEDICINE

## 2025-03-04 PROCEDURE — 2550000001 HC RX 255 CONTRASTS: Performed by: INTERNAL MEDICINE

## 2025-03-04 PROCEDURE — 2720000007 HC OR 272 NO HCPCS: Performed by: INTERNAL MEDICINE

## 2025-03-04 PROCEDURE — 36415 COLL VENOUS BLD VENIPUNCTURE: CPT | Performed by: NURSE PRACTITIONER

## 2025-03-04 PROCEDURE — 99152 MOD SED SAME PHYS/QHP 5/>YRS: CPT | Performed by: INTERNAL MEDICINE

## 2025-03-04 PROCEDURE — 99153 MOD SED SAME PHYS/QHP EA: CPT | Performed by: INTERNAL MEDICINE

## 2025-03-04 PROCEDURE — C1894 INTRO/SHEATH, NON-LASER: HCPCS | Performed by: INTERNAL MEDICINE

## 2025-03-04 PROCEDURE — 7100000002 HC RECOVERY ROOM TIME - EACH INCREMENTAL 1 MINUTE: Performed by: INTERNAL MEDICINE

## 2025-03-04 PROCEDURE — C1760 CLOSURE DEV, VASC: HCPCS | Performed by: INTERNAL MEDICINE

## 2025-03-04 PROCEDURE — 2500000004 HC RX 250 GENERAL PHARMACY W/ HCPCS (ALT 636 FOR OP/ED): Performed by: INTERNAL MEDICINE

## 2025-03-04 PROCEDURE — 99222 1ST HOSP IP/OBS MODERATE 55: CPT | Performed by: NURSE PRACTITIONER

## 2025-03-04 PROCEDURE — 2500000004 HC RX 250 GENERAL PHARMACY W/ HCPCS (ALT 636 FOR OP/ED): Performed by: NURSE PRACTITIONER

## 2025-03-04 PROCEDURE — 7100000001 HC RECOVERY ROOM TIME - INITIAL BASE CHARGE: Performed by: INTERNAL MEDICINE

## 2025-03-04 PROCEDURE — 7100000010 HC PHASE TWO TIME - EACH INCREMENTAL 1 MINUTE: Performed by: INTERNAL MEDICINE

## 2025-03-04 PROCEDURE — C1769 GUIDE WIRE: HCPCS | Performed by: INTERNAL MEDICINE

## 2025-03-04 RX ORDER — ACETAMINOPHEN 650 MG/1
650 SUPPOSITORY RECTAL EVERY 6 HOURS PRN
Status: DISCONTINUED | OUTPATIENT
Start: 2025-03-04 | End: 2025-03-04 | Stop reason: HOSPADM

## 2025-03-04 RX ORDER — FENTANYL CITRATE 50 UG/ML
INJECTION, SOLUTION INTRAMUSCULAR; INTRAVENOUS AS NEEDED
Status: DISCONTINUED | OUTPATIENT
Start: 2025-03-04 | End: 2025-03-04 | Stop reason: HOSPADM

## 2025-03-04 RX ORDER — HEPARIN SODIUM 1000 [USP'U]/ML
INJECTION, SOLUTION INTRAVENOUS; SUBCUTANEOUS AS NEEDED
Status: DISCONTINUED | OUTPATIENT
Start: 2025-03-04 | End: 2025-03-04 | Stop reason: HOSPADM

## 2025-03-04 RX ORDER — ACETAMINOPHEN 325 MG/1
650 TABLET ORAL EVERY 6 HOURS PRN
Status: DISCONTINUED | OUTPATIENT
Start: 2025-03-04 | End: 2025-03-04 | Stop reason: HOSPADM

## 2025-03-04 RX ORDER — NITROGLYCERIN 40 MG/100ML
INJECTION INTRAVENOUS AS NEEDED
Status: DISCONTINUED | OUTPATIENT
Start: 2025-03-04 | End: 2025-03-04 | Stop reason: HOSPADM

## 2025-03-04 RX ORDER — SODIUM CHLORIDE 9 MG/ML
100 INJECTION, SOLUTION INTRAVENOUS CONTINUOUS
Status: DISCONTINUED | OUTPATIENT
Start: 2025-03-04 | End: 2025-03-04

## 2025-03-04 RX ORDER — SODIUM CHLORIDE 9 MG/ML
3 INJECTION, SOLUTION INTRAVENOUS CONTINUOUS
Status: DISCONTINUED | OUTPATIENT
Start: 2025-03-04 | End: 2025-03-04 | Stop reason: HOSPADM

## 2025-03-04 RX ORDER — NAPROXEN SODIUM 220 MG/1
324 TABLET, FILM COATED ORAL ONCE
Status: COMPLETED | OUTPATIENT
Start: 2025-03-04 | End: 2025-03-04

## 2025-03-04 RX ORDER — LIDOCAINE HYDROCHLORIDE 20 MG/ML
INJECTION, SOLUTION INFILTRATION; PERINEURAL AS NEEDED
Status: DISCONTINUED | OUTPATIENT
Start: 2025-03-04 | End: 2025-03-04 | Stop reason: HOSPADM

## 2025-03-04 RX ORDER — ACETAMINOPHEN 160 MG/5ML
650 SOLUTION ORAL EVERY 6 HOURS PRN
Status: DISCONTINUED | OUTPATIENT
Start: 2025-03-04 | End: 2025-03-04 | Stop reason: HOSPADM

## 2025-03-04 RX ORDER — MIDAZOLAM HYDROCHLORIDE 1 MG/ML
INJECTION, SOLUTION INTRAMUSCULAR; INTRAVENOUS AS NEEDED
Status: DISCONTINUED | OUTPATIENT
Start: 2025-03-04 | End: 2025-03-04 | Stop reason: HOSPADM

## 2025-03-04 RX ADMIN — ASPIRIN 324 MG: 81 TABLET, CHEWABLE ORAL at 13:20

## 2025-03-04 RX ADMIN — SODIUM CHLORIDE 100 ML/HR: 9 INJECTION, SOLUTION INTRAVENOUS at 17:47

## 2025-03-04 ASSESSMENT — ENCOUNTER SYMPTOMS
ENDOCRINE NEGATIVE: 1
SHORTNESS OF BREATH: 1
PSYCHIATRIC NEGATIVE: 1
NEUROLOGICAL NEGATIVE: 1
ALLERGIC/IMMUNOLOGIC NEGATIVE: 1
HEMATOLOGIC/LYMPHATIC NEGATIVE: 1
EYES NEGATIVE: 1
GASTROINTESTINAL NEGATIVE: 1
MUSCULOSKELETAL NEGATIVE: 1
CONSTITUTIONAL NEGATIVE: 1

## 2025-03-04 ASSESSMENT — PAIN - FUNCTIONAL ASSESSMENT
PAIN_FUNCTIONAL_ASSESSMENT: 0-10

## 2025-03-04 ASSESSMENT — PAIN SCALES - GENERAL
PAINLEVEL_OUTOF10: 0 - NO PAIN

## 2025-03-04 ASSESSMENT — COLUMBIA-SUICIDE SEVERITY RATING SCALE - C-SSRS
1. IN THE PAST MONTH, HAVE YOU WISHED YOU WERE DEAD OR WISHED YOU COULD GO TO SLEEP AND NOT WAKE UP?: NO
6. HAVE YOU EVER DONE ANYTHING, STARTED TO DO ANYTHING, OR PREPARED TO DO ANYTHING TO END YOUR LIFE?: NO
2. HAVE YOU ACTUALLY HAD ANY THOUGHTS OF KILLING YOURSELF?: NO

## 2025-03-04 NOTE — H&P
History Of Present Illness  Dianne Clay is a 85 y.o. female with PMHx significant for CAD s/p MI, remote PCI , hx pericardial effusion s/p pericardial surgery in mid ,  pAF (not on OAC 2/2 hx multiple GI bleeds), HTN, OA, hyperthyroidism, GERD, PAD presenting with c/o exertional angina, hx pHTN.  Past Medical History:  Past Medical History:   Diagnosis Date    Adrenal nodule     Allergic contact dermatitis, unspecified cause 2015    Allergic dermatitis    Anemia     Arthritis     Atrial fibrillation, chronic (Multi)     Contusion of unspecified knee, initial encounter 2018    Knee contusion    Diverticular hemorrhage     Effusion, unspecified knee 2019    Knee swelling    GERD (gastroesophageal reflux disease)     Heart attack     Hypertension     Hyperthyroidism     Noninfective gastroenteritis and colitis, unspecified 2022    Enteritis    Noninfective gastroenteritis and colitis, unspecified     Colitis    Other conditions influencing health status     Ulcer    PAD (peripheral artery disease) (CMS-[facility])     Pain in left knee 2014    Knee pain, left anterior    Personal history of other diseases of the musculoskeletal system and connective tissue     Personal history of arthritis    Pneumonia     Rectal bleeding     Rectal ulcer     S/P primary angioplasty with coronary stent         Past Surgical History:  Past Surgical History:   Procedure Laterality Date    CATARACT EXTRACTION  2014    Cataract Surgery     SECTION, CLASSIC  2014     Section    COLONOSCOPY  2014    Colonoscopy (Fiberoptic)    CORONARY ANGIOPLASTY WITH STENT PLACEMENT  2014    Cath Stent Placement    FLEXIBLE SIGMOIDOSCOPY      OTHER SURGICAL HISTORY  2023    Cholecystectomy laparoscopic    UPPER GASTROINTESTINAL ENDOSCOPY      UPPER GASTROINTESTINAL ENDOSCOPY      UPPER GASTROINTESTINAL ENDOSCOPY                Social History:  Social History     Tobacco Use     Smoking status: Never     Passive exposure: Never    Smokeless tobacco: Never   Vaping Use    Vaping status: Never Used   Substance Use Topics    Alcohol use: Yes     Comment: RARE beer    Drug use: Never       Family History:  Family History   Problem Relation Name Age of Onset    Other (cardiac disorder) Mother      Other (cva) Mother      Other (cardiac disorder) Father      Other (cardiac disorder) Sister      Cancer Sister      Other (MI) Sister          Allergies:  Allergies   Allergen Reactions    Bee Venom Protein (Honey Bee) Swelling        Home Medications:  Current Outpatient Medications   Medication Instructions    amiodarone (Pacerone) 200 mg tablet Take 1 tablet by mouth once daily with a meal.    atorvastatin (LIPITOR) 40 mg, oral, Daily    FeroSuL 325 mg, oral, Daily, Take with food.    methIMAzole (TAPAZOLE) 5 mg, oral, Daily    pantoprazole (ProtoNix) 40 mg EC tablet TAKE 1 TABLET BY MOUTH ONCE DAILY       Inpatient Medications:  Scheduled medications   Medication Dose Route Frequency     PRN medications   Medication     Continuous Medications   Medication Dose Last Rate    sodium chloride 0.9%  100 mL/hr           Review of Systems   Constitutional: Negative.    HENT: Negative.     Eyes: Negative.    Respiratory:  Positive for shortness of breath.    Cardiovascular:  Positive for chest pain.   Gastrointestinal: Negative.    Endocrine: Negative.    Genitourinary: Negative.    Musculoskeletal: Negative.    Skin: Negative.    Allergic/Immunologic: Negative.    Neurological: Negative.    Hematological: Negative.    Psychiatric/Behavioral: Negative.            Physical Exam  General:  Elderly female. Patient is awake, alert, and oriented.  Patient is in no acute distress.  HEENT:  Pupils equal and reactive.  Normocephalic.  Moist mucosa.    Neck:  No JVD.   Cardiovascular:  Regular rate and rhythm.  Normal S1 and S2. No murmurs/rubs/gallops. Radial pulses 1+.   Pulmonary:  Clear to auscultation  "bilaterally.  Abdomen:  Soft. Non-tender.   Non-distended.  Positive bowel sounds.  Lower Extremities:  Pedal pulses 1+ No LE edema.  Neurologic:  Cranial nerves II-XII grossly intact.   No focal deficit.   Skin: Skin warm and dry, no lesions. Normal skin turgor.   Psychiatric: Normal affect.     Sedation Plan    ASA 3     Mallampati class: III.    Risks, benefits, and alternatives discussed with patient.         NPO since 000    Last Recorded Vitals  Blood pressure (!) 177/91, pulse 83, temperature 36.6 °C (97.9 °F), temperature source Temporal, resp. rate 16, height 1.6 m (5' 3\"), weight 53.5 kg (118 lb), SpO2 93%.         Vitals from the Past 24 Hours  Heart Rate:  [83]   Temp:  [36.6 °C (97.9 °F)]   Resp:  [16]   BP: (177)/(91)   Height:  [160 cm (5' 3\")]   Weight:  [53.5 kg (118 lb)]   SpO2:  [93 %]          Relevant Results    Labs    POCT Glucose:      POCT Urine Pregnancy:       CBC:   Recent Labs     03/04/25  1330 01/14/25  0822 12/07/24  0509 12/06/24  0431 12/05/24  0157 09/08/24  0750   WBC 6.7 5.9 6.4 6.1 9.0 8.3   HGB 11.1* 11.1* 8.3* 8.3* 10.2* 7.9*   HCT 35.8* 37.5 26.5* 26.7* 33.2* 24.8*    183 208 176 209 217   MCV 84 91 86 87 88 95     BMP/CMP:   Recent Labs     03/04/25  1330 01/14/25  0822 12/07/24  0509 12/06/24  0431 12/05/24  0157 09/08/24  0750 09/05/24  0658 09/04/24  1345 09/03/24  2224 09/03/24  2224 11/21/22  0938 11/29/21  0604    140 137 136 137 138   < > 140   < > 137 137 138   K 4.1 4.7 3.5 3.4* 3.7 3.6   < > 4.0   < > 3.7 3.8 3.4*    104 105 106 103 106   < > 108*   < > 106 98 104   BUN 14 12 11 11 22 9   < > 26*   < > 25* 19 13   CREATININE 0.76 0.81 0.75 0.72 1.42* 0.72   < > 0.73   < > 1.02 1.12* 0.76   CO2 23 27 23 23 25 23   < > 19*   < > 21 26 27   CALCIUM 8.7 9.0 8.1* 8.0* 8.4* 7.1*   < > 7.5*   < > 8.5* 9.0 7.7*   PROT  --  9.8*  --  7.6  --   --   --  7.2  --  7.8 9.1* 6.4   BILITOT  --  0.9  --  0.8  --   --   --  1.1  --  0.8 1.2 0.9   ALKPHOS  --  99  " "--  80  --   --   --  73  --  77 71 54   ALT  --  12  --  8  --   --   --  5*  --  5* 17 14   AST  --  29  --  15  --   --   --  12  --  11 24 17   GLUCOSE 86 96 98 89 125* 83   < > 90   < > 158* 72* 88    < > = values in this interval not displayed.      Magnesium:   Recent Labs     01/14/25  0822 11/29/21  0604 11/27/21  1020 11/26/21  0638 11/25/21  0655   MG 2.10 1.83 2.12 1.75 1.81     Lipid Panel:   Recent Labs     01/14/25  0822 11/21/22  0938 11/27/21  0600   CHOL 141 161 102   HDL 51.3 56.3 39.0*   CHHDL 2.7 2.9 2.6   VLDL  --  15 16   TRIG  --  75 78     Cardiac       No lab exists for component: \"CK\", \"CKMBP\"   Hemoglobin A1C:   Recent Labs     01/14/25  0822   HGBA1C 5.0     TSH/ Free T4:   Recent Labs     01/14/25  0822 12/05/24  1457 11/21/22  0938   TSH 0.51 0.59 0.99     Iron:   Recent Labs     01/14/25  0822 11/26/21  0638 11/25/21  0710 09/30/19  0420   FERRITIN  --   --  64 77   TIBC  --   --  276  --    IRONSAT  --   --  7*  --    * 919*  --   --      Coag:     ABO: No results found for: \"ABO\"    Past Cardiology Tests (Last 3 Years):    EKG:  Recent Labs     01/15/25  1209 12/05/24  0204 11/27/21  1114   ATRRATE 83 66 71   VENTRATE 83 66 71   PRINT 192 214 180   QRSDUR 96 88 84   QTCFRED 470 481 452   QTCCALCB 495 488 465     Encounter Date: 01/15/25   ECG 12 Lead   Result Value    Ventricular Rate 83    Atrial Rate 83    VA Interval 192    QRS Duration 96    QT Interval 422    QTC Calculation(Bazett) 495    P Axis 88    R Axis 162    T Axis 43    QRS Count 14    Q Onset 223    P Onset 127    P Offset 173    T Offset 434    QTC Fredericia 470    Narrative    Sinus rhythm with Premature supraventricular complexes  Right axis deviation  Pulmonary disease pattern  Prolonged QT  Abnormal ECG  When compared with ECG of 05-DEC-2024 02:02,  No significant change was found     Echo:  Echocardiogram:   Transthoracic Echo (TTE) Complete 01/14/2025    [facility], [address] [place] [address]" [place] [address]  Tel [phone] and Fax [phone]    TRANSTHORACIC ECHOCARDIOGRAM REPORT      Patient Name:       JUANIS CASTRO       Reading Physician:    [id number] Flavio Queen MD  Study Date:         1/14/2025           Ordering Provider:    [id number] FLAVIO QUEEN  MRN/PID:            [id number]            Fellow:  Accession#:         [id number]        Nurse:                Lilliana Pérez RN  Date of Birth/Age:  1940 / 84      Sonographer:          Jessica arizmendi                                     RDAJITH  Gender assigned at  F                   Additional Staff:  Birth:  Height:             160.02 cm           Admit Date:  Weight:             52.16 kg            Admission Status:     Outpatient  BSA / BMI:          1.53 m2 / 20.37     Encounter#:           [id number]  kg/m2  Blood Pressure:     162/83 mmHg         Department Location:  [facility]    Study Type:    TRANSTHORACIC ECHO (TTE) COMPLETE  Diagnosis/ICD: Permanent AFib-I48.21  Indication:    Atrial Fibrillation  CPT Code:      Echo Complete w Full Doppler-[id number]    Patient History:  Pertinent History: A-Fib and HTN. PAD, GERD.    Study Detail: The following Echo studies were performed: 2D, M-Mode, Doppler and  Strain. Agitated saline used as a contrast agent for intraseptal  flow evaluation. The patient was awake.      PHYSICIAN INTERPRETATION:  Left Ventricle: Left ventricular ejection fraction is normal, by visual estimate at 55-60%. There is mild concentric left ventricular hypertrophy. There are no regional left ventricular wall motion abnormalities. The left ventricular cavity size is normal. There is left ventricular concentric remodeling. Spectral Doppler shows a Grade I (impaired relaxation pattern) of left ventricular diastolic filling with normal left atrial filling pressure.  Left Atrium: The left atrium is mildly dilated.  Right Ventricle: The right ventricle is normal in size. There is normal right  ventricular global systolic function.  Right Atrium: The right atrium is normal in size.  Aortic Valve: The aortic valve is probably trileaflet. There is mild aortic valve cusp calcification. There is mild to moderate aortic valve thickening. There is evidence of mildly elevated transaortic gradients consistent with sclerosis of the aortic valve. There is no evidence of aortic valve regurgitation. The peak instantaneous gradient of the aortic valve is 12 mmHg.  Mitral Valve: The mitral valve is normal in structure. There is mild to moderate mitral annular calcification. There is mild to moderate mitral valve regurgitation. The mitral regurgitant orifice area is 21 mm2. The mitral regurgitant volume is 38.26 ml.  Tricuspid Valve: The tricuspid valve is structurally normal. There is mild tricuspid regurgitation.  Pulmonic Valve: The pulmonic valve is not well visualized. There is physiologic pulmonic valve regurgitation.  Pericardium: There is no pericardial effusion noted.  Aorta: The aortic root was not well visualized.      CONCLUSIONS:  1. Left ventricular ejection fraction is normal, by visual estimate at 55-60%.  2. Spectral Doppler shows a Grade I (impaired relaxation pattern) of left ventricular diastolic filling with normal left atrial filling pressure.  3. There is normal right ventricular global systolic function.  4. The left atrium is mildly dilated.  5. Mild to moderate mitral valve regurgitation.  6. Aortic valve sclerosis.    QUANTITATIVE DATA SUMMARY:    2D MEASUREMENTS:          Normal Ranges:  Ao Root d:       3.50 cm  (2.0-3.7cm)  LAs:             2.80 cm  (2.7-4.0cm)  IVSd:            1.01 cm  (0.6-1.1cm)  LVPWd:           1.10 cm  (0.6-1.1cm)  LVIDd:           3.73 cm  (3.9-5.9cm)  LVIDs:           2.44 cm  LV Mass Index:   81 g/m2  LVEDV Index:     71 ml/m2  LV % FS          34.6 %      LA VOLUME:                    Normal Ranges:  LA Vol A4C:        50.3 ml    (22+/-6mL/m2)  LA Vol A2C:         40.8 ml  LA Vol BP:         45.3 ml  LA Vol Index A4C:  32.9ml/m2  LA Vol Index A2C:  26.7 ml/m2  LA Vol Index BP:   29.6 ml/m2  LA Area A4C:       19.3 cm2  LA Area A2C:       17.4 cm2  LA Major Axis A4C: 6.3 cm  LA Major Axis A2C: 6.3 cm  LA Volume Index:   28.0 ml/m2      RA VOLUME BY A/L METHOD:            Normal Ranges:  RA Vol A4C:              35.5 ml    (8.3-19.5ml)  RA Vol Index A4C:        23.2 ml/m2  RA Area A4C:             15.7 cm2  RA Major Axis A4C:       5.9 cm      M-MODE MEASUREMENTS:         Normal Ranges:  Ao Root:             3.30 cm (2.0-3.7cm)  LAs:                 4.10 cm (2.7-4.0cm)      AORTA MEASUREMENTS:         Normal Ranges:  Asc Ao, d:          3.00 cm (2.1-3.4cm)      LV SYSTOLIC FUNCTION BY 2D PLANIMETRY (MOD):  Normal Ranges:  EF-A4C View:    62 % (>=55%)  EF-A2C View:    64 %  EF-Biplane:     64 %  EF-Visual:      58 %  LV EF Reported: 58 %      LV DIASTOLIC FUNCTION:             Normal Ranges:  MV Peak E:             1.01 m/s    (0.7-1.2 m/s)  MV Peak A:             1.21 m/s    (0.42-0.7 m/s)  E/A Ratio:             0.83        (1.0-2.2)  MV e'                  0.069 m/s   (>8.0)  MV lateral e'          0.09 m/s  MV medial e'           0.05 m/s  MV A Dur:              143.00 msec  E/e' Ratio:            14.62       (<8.0)  PulmV Sys Masood:         62.50 cm/s  PulmV Irving Masood:        49.30 cm/s  PulmV S/D Masood:         1.30      MITRAL VALVE:          Normal Ranges:  MV DT:        211 msec (150-240msec)      MITRAL INSUFFICIENCY:             Normal Ranges:  PISA Radius:          0.6 cm  MR VTI:               184.00 cm  MR Vmax:              563.00 cm/s  MR Alias Masood:         61.6 cm/s  MR Volume:            38.26 ml  MR Flow Rt:           117.08 ml/s  MR EROA:              21 mm2      AORTIC VALVE:            Normal Ranges:  AoV Vmax:      1.75 m/s  (<=1.7m/s)  AoV Peak P.2 mmHg (<20mmHg)  LVOT Max Masood:  1.13 m/s  (<=1.1m/s)  LVOT VTI:      23.20 cm  LVOT Diameter: 1.90 cm    (1.8-2.4cm)  AoV Area,Vmax: 1.83 cm2  (2.5-4.5cm2)      RIGHT VENTRICLE:  RV Basal 3.20 cm  RV Mid   1.80 cm  RV Major 7.1 cm  TAPSE:   17.0 mm  RV s'    0.11 m/s      TRICUSPID VALVE/RVSP:          Normal Ranges:  Peak TR Velocity:     2.13 m/s  RV Syst Pressure:     21 mmHg  (< 30mmHg)  IVC Diam:             1.70 cm      PULMONIC VALVE:          Normal Ranges:  PV Max Masood:     1.1 m/s  (0.6-0.9m/s)  PV Max P.6 mmHg      Pulmonary Veins:  PulmV Irving Masood: 49.30 cm/s  PulmV S/D Masood:  1.30  PulmV Sys Masood:  62.50 cm/s      [id number] Flavio Queen MD  Electronically signed on 2025 at 5:09:17 PM        ** Final **    Ejection Fractions:  LV EF   Date/Time Value Ref Range Status   2025 09:25 AM 58 %      Cath:  Coronary Angiography: No results found for this or any previous visit from the past 1800 days.    Right Heart Cath: No results found for this or any previous visit from the past 1800 days.    Stress Test:  Nuclear:No results found for this or any previous visit from the past 1800 days.    Metabolic Stress: No results found for this or any previous visit from the past 1800 days.    Cardiac Imaging:  Cardiac Scoring: No results found for this or any previous visit from the past 1800 days.    Cardiac MRI: No results found for this or any previous visit from the past 1800 days.         Assessment/Plan  Assessment/Plan   Assessment & Plan  CAD (coronary artery disease)        -LHC/RHC with Dr. Queen today  - mg PO pre-procedure    -CBC, BMP pre-procedure    NP discussed with Dr. Queen regarding plan of care/ discharge plan      I spent 30 minutes in the professional and overall care of this patient.      JEREMY York-CNP

## 2025-03-04 NOTE — POST-PROCEDURE NOTE
Physician Transition of Care Summary  Invasive Cardiovascular Lab    Procedure Date: 3/4/2025  Attending:    * Flavio Queen - Primary  Resident/Fellow/Other Assistant: Surgeons and Role:  * No surgeons found with a matching role *    Indications:   Pre-op Diagnosis      * CAD (coronary artery disease) [I25.10]    Post-procedure diagnosis:   Post-op Diagnosis     * CAD (coronary artery disease) [I25.10]    Procedure(s):   Left & Right Heart Cath w Angiography & LV  38017 - VT R & L HRT CATH WINJX HRT ART& L VENTR IMG        Procedure Findings:   Coronary angiogram:   Right dominant system.   LM: Patent, No evidence of obstructive disease  LAD: mild diffuse disease  Diag: Moderate disease  LCX: Mild diffuse disease  RCA: Severe prox-mid ISR with L-R collaterals.     Left ventriculography showing normal systolic function with preserved EF. Does show segmental wall motion abnormality with hypokinesis of the inferior/diaphragmatic wall.      Right heart cath hemodynamics:  RA 5  RV 46/1 (RVEDP 7)  PA 50/16 (mean 30)  PCWP 13  PA sat 64%  CO/CI 4.49/2.94        Description of the Procedure:   Access: Right radial artery, 6Fr  Closure: TR band    Venous access: Right brachial vein   Closure: Manual pressure    Complications:   None    Stents/Implants:   Implants       No implant documentation for this case.            Anticoagulation/Antiplatelet Plan:   Continue home medications    Estimated Blood Loss:   10 mL    Anesthesia: Moderate Sedation Anesthesia Staff: No anesthesia staff entered.    Any Specimen(s) Removed:   Order Name Source Comment Collection Info Order Time   CBC Blood, Venous  Collected By: Camelia Johnson RN 3/4/2025  1:12 PM     Release result to "[employer]t   Immediate        BASIC METABOLIC PANEL Blood, Venous  Collected By: Camelia Johnson RN 3/4/2025  1:12 PM     Release result to MyChart   Immediate            Disposition:   Home      Electronically signed by: Jorge Sandoval MD,  3/4/2025 6:40 PM

## 2025-03-04 NOTE — DISCHARGE INSTRUCTIONS
CARDIAC CATHETERIZATION DISCHARGE INSTRUCTIONS    Follow up with Dr. Queen in the next month. Appointment requested. Please call the office if you do not hear anything in 3 business days     Start Eliquis 2.5 mg TWICE daily starting tomorrow. Prescription sent to [facility] in [place].     FOR SUDDEN AND SEVERE CHEST PAIN, SHORTNESS OF BREATH, EXCESSIVE BLEEDING, SIGNS OF STROKE, OR CHANGES IN MENTAL STATUS YOU SHOULD CALL 911 IMMEDIATELY.     If your provider has prescribed aspirin and/or clopidogrel (Plavix), or prasugrel (Effient), or ticagrelor (Brilinta), DO NOT STOP THESE MEDICATIONS for any reason without talking to your cardiologist first. If any of these were prescribed, you must take them every day without missing a single dose. If you are getting low on these medications, contact your provider immediately for a refill.     FOR NEXT 24 HOURS  - Upon discharge, you should return home and rest for the remainder of the day and evening. You do not have to stay on bed rest but should not be very active.  It is recommended a responsible adult be with you for the first 24 hours after the procedure.    - No driving for 24 hours after procedure. Please arrange for someone to drive you home from the hospital today.     - Do not drive, operate machinery, or use power tools for 24 hours after your procedure.     - Do not make any legal decisions for 24 hours after your procedure.     - Do not drink alcoholic beverages for 24 hours after your procedure.    -Stay extra hydrated for 24 hours after your procedure; goal fluid intake around 72-96 ounces for the next 24 hours.       WOUND CARE   *FOR FEMORAL (LEG) ACCESS*  ·      Avoid heavy lifting (over 10 pounds) for 7 days, squatting or excessive bending for 2 days, and strenuous exercise for 7 days.  ·      No submerged bathing, swimming, or hot tubs for the next 7 days, or until fully healed.  ·      Avoid sexual activity for 3-4 days until any groin  discomfort has ceased.     *FOR RADIAL (WRIST) ACCESS*  ·      No lifting more than 5 pounds or excessive use of the wrist for 24 hours - for example, treat your wrist as if it is sprained.  ·      Do not engage in vigorous activities (tennis, golf, bowling, weights) for at least 48 hours after the procedure.  ·      Do not submerge the wrist for 7 days after the procedure.  ·      You should expect mild tingling in your hand and tenderness at the puncture site for up to 3 days.    - The transparent dressing should be removed from the site 24 hours after the procedure.  Wash the site gently with soap and water. Rinse well and pat dry. Keep the area clean and dry. You may apply a Band-Aid to the site. Avoid lotions, ointments, or powders until fully healed.     - You may shower the day after your procedure.      - It is normal to notice a small bruise around the puncture site and/or a small grape sized or smaller lump. Any large bruising or large lump warrants a call to the office.     - If bleeding should occur, lay down and apply pressure to the affected area for 10 minutes.  If the bleeding stops notify your physician.  If there is a large amount of bleeding or spurting of blood CALL 911 immediately.  DO NOT drive yourself to the hospital.    - You may experience some tenderness, bruising or minimal inflammation.  If you have any concerns, you may contact the Cath Lab or if any of these symptoms become excessive, contact your cardiologist or go to the emergency room.     OTHER INSTRUCTIONS  - You may take acetaminophen (Tylenol) as directed for discomfort.  If pain is not relieved with acetaminophen (Tylenol), contact your doctor.    - If you notice or experience any of the following, you should notify your doctor or seek medical attention  Chest pain or discomfort  Change in mental status or weakness in extremities.  Dizziness, light headedness, or feeling faint.  Change in the site where the procedure was  performed, such as bleeding or an increased area of bruising or swelling.  Tingling, numbness, pain, or coolness in the leg/arm beyond the site where the procedure was performed.  Signs of infection (i.e. shaking chills, temperature > 100 degrees Fahrenheit, warmth, redness) in the leg/arm area where the procedure was performed.  Changes in urination   Bloody or black stools  Vomiting blood  Severe nose bleeds  Any excessive bleeding    - If you DO NOT have an appointment with your cardiologist within 2-4 weeks following your procedure, please contact their office.

## 2025-03-10 ENCOUNTER — APPOINTMENT (OUTPATIENT)
Dept: CARDIOLOGY | Facility: HOSPITAL | Age: 85
End: 2025-03-10
Payer: MEDICARE

## 2025-03-10 ENCOUNTER — HOSPITAL ENCOUNTER (EMERGENCY)
Facility: HOSPITAL | Age: 85
Discharge: OTHER INSTITUTION | End: 2025-03-10
Attending: FAMILY MEDICINE
Payer: MEDICARE

## 2025-03-10 ENCOUNTER — HOSPITAL ENCOUNTER (INPATIENT)
Facility: HOSPITAL | Age: 85
LOS: 2 days | Discharge: HOME | End: 2025-03-12
Attending: INTERNAL MEDICINE | Admitting: STUDENT IN AN ORGANIZED HEALTH CARE EDUCATION/TRAINING PROGRAM
Payer: MEDICARE

## 2025-03-10 VITALS
OXYGEN SATURATION: 98 % | SYSTOLIC BLOOD PRESSURE: 142 MMHG | HEART RATE: 77 BPM | TEMPERATURE: 98.1 F | WEIGHT: 117.5 LBS | HEIGHT: 63 IN | RESPIRATION RATE: 15 BRPM | DIASTOLIC BLOOD PRESSURE: 74 MMHG | BODY MASS INDEX: 20.82 KG/M2

## 2025-03-10 DIAGNOSIS — K92.2 ACUTE GI BLEEDING: Primary | ICD-10-CM

## 2025-03-10 DIAGNOSIS — K92.2 GASTROINTESTINAL HEMORRHAGE: Primary | ICD-10-CM

## 2025-03-10 DIAGNOSIS — I25.10 ATHEROSCLEROSIS OF NATIVE CORONARY ARTERY OF NATIVE HEART WITHOUT ANGINA PECTORIS: ICD-10-CM

## 2025-03-10 LAB
ABO GROUP (TYPE) IN BLOOD: NORMAL
ALBUMIN SERPL BCP-MCNC: 3.3 G/DL (ref 3.4–5)
ALP SERPL-CCNC: 80 U/L (ref 33–136)
ALT SERPL W P-5'-P-CCNC: 10 U/L (ref 7–45)
ANION GAP SERPL CALC-SCNC: 13 MMOL/L (ref 10–20)
ANTIBODY SCREEN: NORMAL
ANTIBODY SCREEN: NORMAL
APTT PPP: 34 SECONDS (ref 26–36)
AST SERPL W P-5'-P-CCNC: 18 U/L (ref 9–39)
ATRIAL RATE: 87 BPM
BASOPHILS # BLD AUTO: 0.04 X10*3/UL (ref 0–0.1)
BASOPHILS # BLD AUTO: 0.04 X10*3/UL (ref 0–0.1)
BASOPHILS NFR BLD AUTO: 0.6 %
BASOPHILS NFR BLD AUTO: 0.6 %
BILIRUB SERPL-MCNC: 0.6 MG/DL (ref 0–1.2)
BLOOD EXPIRATION DATE: NORMAL
BLOOD EXPIRATION DATE: NORMAL
BNP SERPL-MCNC: 162 PG/ML (ref 0–99)
BUN SERPL-MCNC: 13 MG/DL (ref 6–23)
CALCIUM SERPL-MCNC: 8.7 MG/DL (ref 8.6–10.3)
CARDIAC TROPONIN I PNL SERPL HS: 6 NG/L (ref 0–13)
CARDIAC TROPONIN I PNL SERPL HS: 6 NG/L (ref 0–13)
CARDIAC TROPONIN I PNL SERPL HS: 7 NG/L (ref 0–13)
CHLORIDE SERPL-SCNC: 103 MMOL/L (ref 98–107)
CO2 SERPL-SCNC: 26 MMOL/L (ref 21–32)
CREAT SERPL-MCNC: 0.73 MG/DL (ref 0.5–1.05)
DISPENSE STATUS: NORMAL
DISPENSE STATUS: NORMAL
EGFRCR SERPLBLD CKD-EPI 2021: 81 ML/MIN/1.73M*2
EOSINOPHIL # BLD AUTO: 0.03 X10*3/UL (ref 0–0.4)
EOSINOPHIL # BLD AUTO: 0.1 X10*3/UL (ref 0–0.4)
EOSINOPHIL NFR BLD AUTO: 0.4 %
EOSINOPHIL NFR BLD AUTO: 1.4 %
ERYTHROCYTE [DISTWIDTH] IN BLOOD BY AUTOMATED COUNT: 17 % (ref 11.5–14.5)
ERYTHROCYTE [DISTWIDTH] IN BLOOD BY AUTOMATED COUNT: 17.2 % (ref 11.5–14.5)
GLUCOSE SERPL-MCNC: 108 MG/DL (ref 74–99)
HCT VFR BLD AUTO: 29.2 % (ref 36–46)
HCT VFR BLD AUTO: 29.3 % (ref 36–46)
HCT VFR BLD AUTO: 32.6 % (ref 36–46)
HEMOCCULT SP1 STL QL: POSITIVE
HGB BLD-MCNC: 10.3 G/DL (ref 12–16)
HGB BLD-MCNC: 8.7 G/DL (ref 12–16)
HGB BLD-MCNC: 8.7 G/DL (ref 12–16)
IMM GRANULOCYTES # BLD AUTO: 0.03 X10*3/UL (ref 0–0.5)
IMM GRANULOCYTES # BLD AUTO: 0.03 X10*3/UL (ref 0–0.5)
IMM GRANULOCYTES NFR BLD AUTO: 0.4 % (ref 0–0.9)
IMM GRANULOCYTES NFR BLD AUTO: 0.4 % (ref 0–0.9)
INR PPP: 1.3 (ref 0.9–1.1)
LACTATE SERPL-SCNC: 1 MMOL/L (ref 0.4–2)
LYMPHOCYTES # BLD AUTO: 0.91 X10*3/UL (ref 0.8–3)
LYMPHOCYTES # BLD AUTO: 1.12 X10*3/UL (ref 0.8–3)
LYMPHOCYTES NFR BLD AUTO: 12.7 %
LYMPHOCYTES NFR BLD AUTO: 15.4 %
MAGNESIUM SERPL-MCNC: 1.75 MG/DL (ref 1.6–2.4)
MCH RBC QN AUTO: 27 PG (ref 26–34)
MCH RBC QN AUTO: 27 PG (ref 26–34)
MCHC RBC AUTO-ENTMCNC: 29.8 G/DL (ref 32–36)
MCHC RBC AUTO-ENTMCNC: 31.6 G/DL (ref 32–36)
MCV RBC AUTO: 86 FL (ref 80–100)
MCV RBC AUTO: 91 FL (ref 80–100)
MONOCYTES # BLD AUTO: 0.5 X10*3/UL (ref 0.05–0.8)
MONOCYTES # BLD AUTO: 0.5 X10*3/UL (ref 0.05–0.8)
MONOCYTES NFR BLD AUTO: 6.9 %
MONOCYTES NFR BLD AUTO: 7 %
NEUTROPHILS # BLD AUTO: 5.48 X10*3/UL (ref 1.6–5.5)
NEUTROPHILS # BLD AUTO: 5.67 X10*3/UL (ref 1.6–5.5)
NEUTROPHILS NFR BLD AUTO: 75.3 %
NEUTROPHILS NFR BLD AUTO: 78.9 %
NRBC BLD-RTO: 0 /100 WBCS (ref 0–0)
NRBC BLD-RTO: 0 /100 WBCS (ref 0–0)
P AXIS: 46 DEGREES
P OFFSET: 166 MS
P ONSET: 140 MS
PLATELET # BLD AUTO: 153 X10*3/UL (ref 150–450)
PLATELET # BLD AUTO: 202 X10*3/UL (ref 150–450)
POTASSIUM SERPL-SCNC: 3.3 MMOL/L (ref 3.5–5.3)
PR INTERVAL: 176 MS
PRODUCT BLOOD TYPE: 5100
PRODUCT BLOOD TYPE: 5100
PRODUCT CODE: NORMAL
PRODUCT CODE: NORMAL
PROT SERPL-MCNC: 8.7 G/DL (ref 6.4–8.2)
PROTHROMBIN TIME: 14.2 SECONDS (ref 9.8–12.4)
Q ONSET: 228 MS
QRS COUNT: 14 BEATS
QRS DURATION: 82 MS
QT INTERVAL: 414 MS
QTC CALCULATION(BAZETT): 498 MS
QTC FREDERICIA: 468 MS
R AXIS: 151 DEGREES
RBC # BLD AUTO: 3.22 X10*6/UL (ref 4–5.2)
RBC # BLD AUTO: 3.81 X10*6/UL (ref 4–5.2)
RH FACTOR (ANTIGEN D): NORMAL
SODIUM SERPL-SCNC: 139 MMOL/L (ref 136–145)
T AXIS: 51 DEGREES
T OFFSET: 435 MS
UNIT ABO: NORMAL
UNIT ABO: NORMAL
UNIT NUMBER: NORMAL
UNIT NUMBER: NORMAL
UNIT RH: NORMAL
UNIT RH: NORMAL
UNIT VOLUME: 350
UNIT VOLUME: 350
VENTRICULAR RATE: 87 BPM
WBC # BLD AUTO: 7.2 X10*3/UL (ref 4.4–11.3)
WBC # BLD AUTO: 7.3 X10*3/UL (ref 4.4–11.3)
XM INTEP: NORMAL
XM INTEP: NORMAL

## 2025-03-10 PROCEDURE — 36415 COLL VENOUS BLD VENIPUNCTURE: CPT | Performed by: FAMILY MEDICINE

## 2025-03-10 PROCEDURE — 2500000004 HC RX 250 GENERAL PHARMACY W/ HCPCS (ALT 636 FOR OP/ED): Performed by: NURSE PRACTITIONER

## 2025-03-10 PROCEDURE — 2500000004 HC RX 250 GENERAL PHARMACY W/ HCPCS (ALT 636 FOR OP/ED): Performed by: FAMILY MEDICINE

## 2025-03-10 PROCEDURE — 99285 EMERGENCY DEPT VISIT HI MDM: CPT | Mod: 25 | Performed by: FAMILY MEDICINE

## 2025-03-10 PROCEDURE — 84484 ASSAY OF TROPONIN QUANT: CPT | Performed by: FAMILY MEDICINE

## 2025-03-10 PROCEDURE — 96361 HYDRATE IV INFUSION ADD-ON: CPT

## 2025-03-10 PROCEDURE — 85610 PROTHROMBIN TIME: CPT | Performed by: FAMILY MEDICINE

## 2025-03-10 PROCEDURE — 80053 COMPREHEN METABOLIC PANEL: CPT | Performed by: FAMILY MEDICINE

## 2025-03-10 PROCEDURE — 83880 ASSAY OF NATRIURETIC PEPTIDE: CPT | Performed by: FAMILY MEDICINE

## 2025-03-10 PROCEDURE — 82270 OCCULT BLOOD FECES: CPT | Performed by: FAMILY MEDICINE

## 2025-03-10 PROCEDURE — 2500000001 HC RX 250 WO HCPCS SELF ADMINISTERED DRUGS (ALT 637 FOR MEDICARE OP): Performed by: NURSE PRACTITIONER

## 2025-03-10 PROCEDURE — 93005 ELECTROCARDIOGRAM TRACING: CPT

## 2025-03-10 PROCEDURE — 86920 COMPATIBILITY TEST SPIN: CPT

## 2025-03-10 PROCEDURE — 83605 ASSAY OF LACTIC ACID: CPT | Performed by: FAMILY MEDICINE

## 2025-03-10 PROCEDURE — 85014 HEMATOCRIT: CPT | Performed by: NURSE PRACTITIONER

## 2025-03-10 PROCEDURE — 96374 THER/PROPH/DIAG INJ IV PUSH: CPT

## 2025-03-10 PROCEDURE — 99222 1ST HOSP IP/OBS MODERATE 55: CPT | Performed by: NURSE PRACTITIONER

## 2025-03-10 PROCEDURE — 85730 THROMBOPLASTIN TIME PARTIAL: CPT | Performed by: FAMILY MEDICINE

## 2025-03-10 PROCEDURE — 1200000002 HC GENERAL ROOM WITH TELEMETRY DAILY

## 2025-03-10 PROCEDURE — 86923 COMPATIBILITY TEST ELECTRIC: CPT

## 2025-03-10 PROCEDURE — 83735 ASSAY OF MAGNESIUM: CPT | Performed by: FAMILY MEDICINE

## 2025-03-10 PROCEDURE — 86900 BLOOD TYPING SEROLOGIC ABO: CPT | Performed by: NURSE PRACTITIONER

## 2025-03-10 PROCEDURE — 86850 RBC ANTIBODY SCREEN: CPT | Performed by: FAMILY MEDICINE

## 2025-03-10 PROCEDURE — 85025 COMPLETE CBC W/AUTO DIFF WBC: CPT | Performed by: FAMILY MEDICINE

## 2025-03-10 RX ORDER — ACETAMINOPHEN 650 MG/1
650 SUPPOSITORY RECTAL EVERY 4 HOURS PRN
Status: DISCONTINUED | OUTPATIENT
Start: 2025-03-10 | End: 2025-03-12

## 2025-03-10 RX ORDER — POTASSIUM CHLORIDE 1.5 G/1.58G
20 POWDER, FOR SOLUTION ORAL ONCE
Status: COMPLETED | OUTPATIENT
Start: 2025-03-10 | End: 2025-03-10

## 2025-03-10 RX ORDER — ACETAMINOPHEN 160 MG/5ML
650 SOLUTION ORAL EVERY 4 HOURS PRN
Status: DISCONTINUED | OUTPATIENT
Start: 2025-03-10 | End: 2025-03-12

## 2025-03-10 RX ORDER — ACETAMINOPHEN 325 MG/1
650 TABLET ORAL EVERY 4 HOURS PRN
Status: DISCONTINUED | OUTPATIENT
Start: 2025-03-10 | End: 2025-03-12

## 2025-03-10 RX ORDER — PANTOPRAZOLE SODIUM 40 MG/10ML
40 INJECTION, POWDER, LYOPHILIZED, FOR SOLUTION INTRAVENOUS 2 TIMES DAILY
Status: DISCONTINUED | OUTPATIENT
Start: 2025-03-10 | End: 2025-03-12 | Stop reason: HOSPADM

## 2025-03-10 RX ORDER — FERROUS SULFATE 325(65) MG
1 TABLET ORAL DAILY
Status: DISCONTINUED | OUTPATIENT
Start: 2025-03-10 | End: 2025-03-12 | Stop reason: HOSPADM

## 2025-03-10 RX ORDER — ONDANSETRON HYDROCHLORIDE 2 MG/ML
4 INJECTION, SOLUTION INTRAVENOUS EVERY 8 HOURS PRN
Status: DISCONTINUED | OUTPATIENT
Start: 2025-03-10 | End: 2025-03-12 | Stop reason: HOSPADM

## 2025-03-10 RX ORDER — ATORVASTATIN CALCIUM 40 MG/1
40 TABLET, FILM COATED ORAL DAILY
Status: DISCONTINUED | OUTPATIENT
Start: 2025-03-10 | End: 2025-03-12 | Stop reason: HOSPADM

## 2025-03-10 RX ORDER — METHIMAZOLE 5 MG/1
5 TABLET ORAL DAILY
Status: DISCONTINUED | OUTPATIENT
Start: 2025-03-11 | End: 2025-03-12 | Stop reason: HOSPADM

## 2025-03-10 RX ORDER — AMIODARONE HYDROCHLORIDE 200 MG/1
200 TABLET ORAL DAILY
Status: DISCONTINUED | OUTPATIENT
Start: 2025-03-10 | End: 2025-03-12 | Stop reason: HOSPADM

## 2025-03-10 RX ORDER — PANTOPRAZOLE SODIUM 40 MG/10ML
40 INJECTION, POWDER, LYOPHILIZED, FOR SOLUTION INTRAVENOUS DAILY
Status: DISCONTINUED | OUTPATIENT
Start: 2025-03-10 | End: 2025-03-10 | Stop reason: HOSPADM

## 2025-03-10 RX ORDER — ONDANSETRON 4 MG/1
4 TABLET, FILM COATED ORAL EVERY 8 HOURS PRN
Status: DISCONTINUED | OUTPATIENT
Start: 2025-03-10 | End: 2025-03-12 | Stop reason: HOSPADM

## 2025-03-10 RX ORDER — SODIUM CHLORIDE 9 MG/ML
125 INJECTION, SOLUTION INTRAVENOUS CONTINUOUS
Status: DISCONTINUED | OUTPATIENT
Start: 2025-03-10 | End: 2025-03-10 | Stop reason: HOSPADM

## 2025-03-10 RX ADMIN — ATORVASTATIN CALCIUM 40 MG: 40 TABLET, FILM COATED ORAL at 21:31

## 2025-03-10 RX ADMIN — PANTOPRAZOLE SODIUM 40 MG: 40 INJECTION, POWDER, FOR SOLUTION INTRAVENOUS at 21:31

## 2025-03-10 RX ADMIN — ONDANSETRON 4 MG: 2 INJECTION, SOLUTION INTRAMUSCULAR; INTRAVENOUS at 21:31

## 2025-03-10 RX ADMIN — SODIUM CHLORIDE 500 ML: 9 INJECTION, SOLUTION INTRAVENOUS at 13:05

## 2025-03-10 RX ADMIN — PANTOPRAZOLE SODIUM 40 MG: 40 INJECTION, POWDER, FOR SOLUTION INTRAVENOUS at 09:10

## 2025-03-10 RX ADMIN — FERROUS SULFATE TAB 325 MG (65 MG ELEMENTAL FE) 325 MG: 325 (65 FE) TAB at 21:31

## 2025-03-10 RX ADMIN — POTASSIUM CHLORIDE 20 MEQ: 1.5 POWDER, FOR SOLUTION ORAL at 21:32

## 2025-03-10 RX ADMIN — SODIUM CHLORIDE 125 ML/HR: 0.9 INJECTION, SOLUTION INTRAVENOUS at 09:10

## 2025-03-10 SDOH — ECONOMIC STABILITY: HOUSING INSECURITY: AT ANY TIME IN THE PAST 12 MONTHS, WERE YOU HOMELESS OR LIVING IN A SHELTER (INCLUDING NOW)?: NO

## 2025-03-10 SDOH — SOCIAL STABILITY: SOCIAL INSECURITY: HAVE YOU HAD ANY THOUGHTS OF HARMING ANYONE ELSE?: NO

## 2025-03-10 SDOH — ECONOMIC STABILITY: INCOME INSECURITY: IN THE PAST 12 MONTHS HAS THE ELECTRIC, GAS, OIL, OR WATER COMPANY THREATENED TO SHUT OFF SERVICES IN YOUR HOME?: NO

## 2025-03-10 SDOH — SOCIAL STABILITY: SOCIAL INSECURITY: HAS ANYONE EVER THREATENED TO HURT YOUR FAMILY OR YOUR PETS?: NO

## 2025-03-10 SDOH — SOCIAL STABILITY: SOCIAL INSECURITY: DO YOU FEEL ANYONE HAS EXPLOITED OR TAKEN ADVANTAGE OF YOU FINANCIALLY OR OF YOUR PERSONAL PROPERTY?: NO

## 2025-03-10 SDOH — SOCIAL STABILITY: SOCIAL INSECURITY: WITHIN THE LAST YEAR, HAVE YOU BEEN AFRAID OF YOUR PARTNER OR EX-PARTNER?: NO

## 2025-03-10 SDOH — SOCIAL STABILITY: SOCIAL INSECURITY: WITHIN THE LAST YEAR, HAVE YOU BEEN HUMILIATED OR EMOTIONALLY ABUSED IN OTHER WAYS BY YOUR PARTNER OR EX-PARTNER?: NO

## 2025-03-10 SDOH — SOCIAL STABILITY: SOCIAL INSECURITY: ABUSE: ADULT

## 2025-03-10 SDOH — ECONOMIC STABILITY: FOOD INSECURITY: HOW HARD IS IT FOR YOU TO PAY FOR THE VERY BASICS LIKE FOOD, HOUSING, MEDICAL CARE, AND HEATING?: NOT HARD AT ALL

## 2025-03-10 SDOH — SOCIAL STABILITY: SOCIAL INSECURITY: WERE YOU ABLE TO COMPLETE ALL THE BEHAVIORAL HEALTH SCREENINGS?: YES

## 2025-03-10 SDOH — ECONOMIC STABILITY: FOOD INSECURITY: WITHIN THE PAST 12 MONTHS, THE FOOD YOU BOUGHT JUST DIDN'T LAST AND YOU DIDN'T HAVE MONEY TO GET MORE.: NEVER TRUE

## 2025-03-10 SDOH — HEALTH STABILITY: MENTAL HEALTH: HOW OFTEN DO YOU HAVE SIX OR MORE DRINKS ON ONE OCCASION?: NEVER

## 2025-03-10 SDOH — ECONOMIC STABILITY: HOUSING INSECURITY: IN THE LAST 12 MONTHS, WAS THERE A TIME WHEN YOU WERE NOT ABLE TO PAY THE MORTGAGE OR RENT ON TIME?: NO

## 2025-03-10 SDOH — SOCIAL STABILITY: SOCIAL INSECURITY: DOES ANYONE TRY TO KEEP YOU FROM HAVING/CONTACTING OTHER FRIENDS OR DOING THINGS OUTSIDE YOUR HOME?: NO

## 2025-03-10 SDOH — ECONOMIC STABILITY: HOUSING INSECURITY: IN THE PAST 12 MONTHS, HOW MANY TIMES HAVE YOU MOVED WHERE YOU WERE LIVING?: 0

## 2025-03-10 SDOH — HEALTH STABILITY: MENTAL HEALTH: HOW OFTEN DO YOU HAVE A DRINK CONTAINING ALCOHOL?: MONTHLY OR LESS

## 2025-03-10 SDOH — SOCIAL STABILITY: SOCIAL INSECURITY: ARE THERE ANY APPARENT SIGNS OF INJURIES/BEHAVIORS THAT COULD BE RELATED TO ABUSE/NEGLECT?: NO

## 2025-03-10 SDOH — ECONOMIC STABILITY: TRANSPORTATION INSECURITY: IN THE PAST 12 MONTHS, HAS LACK OF TRANSPORTATION KEPT YOU FROM MEDICAL APPOINTMENTS OR FROM GETTING MEDICATIONS?: NO

## 2025-03-10 SDOH — SOCIAL STABILITY: SOCIAL INSECURITY: DO YOU FEEL UNSAFE GOING BACK TO THE PLACE WHERE YOU ARE LIVING?: NO

## 2025-03-10 SDOH — SOCIAL STABILITY: SOCIAL INSECURITY: HAVE YOU HAD THOUGHTS OF HARMING ANYONE ELSE?: NO

## 2025-03-10 SDOH — ECONOMIC STABILITY: FOOD INSECURITY: WITHIN THE PAST 12 MONTHS, YOU WORRIED THAT YOUR FOOD WOULD RUN OUT BEFORE YOU GOT THE MONEY TO BUY MORE.: NEVER TRUE

## 2025-03-10 SDOH — HEALTH STABILITY: MENTAL HEALTH: HOW MANY DRINKS CONTAINING ALCOHOL DO YOU HAVE ON A TYPICAL DAY WHEN YOU ARE DRINKING?: 1 OR 2

## 2025-03-10 SDOH — SOCIAL STABILITY: SOCIAL INSECURITY: ARE YOU OR HAVE YOU BEEN THREATENED OR ABUSED PHYSICALLY, EMOTIONALLY, OR SEXUALLY BY ANYONE?: NO

## 2025-03-10 ASSESSMENT — ACTIVITIES OF DAILY LIVING (ADL)
GROOMING: INDEPENDENT
HEARING - RIGHT EAR: FUNCTIONAL
WALKS IN HOME: INDEPENDENT
HEARING - LEFT EAR: FUNCTIONAL
JUDGMENT_ADEQUATE_SAFELY_COMPLETE_DAILY_ACTIVITIES: YES
LACK_OF_TRANSPORTATION: NO
TOILETING: INDEPENDENT
BATHING: INDEPENDENT
FEEDING YOURSELF: INDEPENDENT
PATIENT'S MEMORY ADEQUATE TO SAFELY COMPLETE DAILY ACTIVITIES?: YES
DRESSING YOURSELF: INDEPENDENT
ADEQUATE_TO_COMPLETE_ADL: YES

## 2025-03-10 ASSESSMENT — COGNITIVE AND FUNCTIONAL STATUS - GENERAL
MOBILITY SCORE: 22
PATIENT BASELINE BEDBOUND: NO
DAILY ACTIVITIY SCORE: 24
CLIMB 3 TO 5 STEPS WITH RAILING: A LITTLE
MOBILITY SCORE: 24
WALKING IN HOSPITAL ROOM: A LITTLE
DAILY ACTIVITIY SCORE: 24

## 2025-03-10 ASSESSMENT — PAIN - FUNCTIONAL ASSESSMENT
PAIN_FUNCTIONAL_ASSESSMENT: 0-10
PAIN_FUNCTIONAL_ASSESSMENT: 0-10

## 2025-03-10 ASSESSMENT — PAIN SCALES - GENERAL
PAINLEVEL_OUTOF10: 0 - NO PAIN

## 2025-03-10 ASSESSMENT — ENCOUNTER SYMPTOMS
COUGH: 0
ABDOMINAL PAIN: 0
CONSTIPATION: 0
LIGHT-HEADEDNESS: 1
RHINORRHEA: 0
FATIGUE: 0
ABDOMINAL DISTENTION: 0
DYSURIA: 0
PALPITATIONS: 0
NAUSEA: 0
NUMBNESS: 0
CHEST TIGHTNESS: 0
APPETITE CHANGE: 0
BLOOD IN STOOL: 1
DIARRHEA: 1
SHORTNESS OF BREATH: 1
DIZZINESS: 0
FEVER: 0
VOMITING: 0

## 2025-03-10 ASSESSMENT — PATIENT HEALTH QUESTIONNAIRE - PHQ9
2. FEELING DOWN, DEPRESSED OR HOPELESS: NOT AT ALL
1. LITTLE INTEREST OR PLEASURE IN DOING THINGS: NOT AT ALL
SUM OF ALL RESPONSES TO PHQ9 QUESTIONS 1 & 2: 0

## 2025-03-10 ASSESSMENT — COLUMBIA-SUICIDE SEVERITY RATING SCALE - C-SSRS
2. HAVE YOU ACTUALLY HAD ANY THOUGHTS OF KILLING YOURSELF?: NO
1. IN THE PAST MONTH, HAVE YOU WISHED YOU WERE DEAD OR WISHED YOU COULD GO TO SLEEP AND NOT WAKE UP?: NO
6. HAVE YOU EVER DONE ANYTHING, STARTED TO DO ANYTHING, OR PREPARED TO DO ANYTHING TO END YOUR LIFE?: NO
6. HAVE YOU EVER DONE ANYTHING, STARTED TO DO ANYTHING, OR PREPARED TO DO ANYTHING TO END YOUR LIFE?: NO
2. HAVE YOU ACTUALLY HAD ANY THOUGHTS OF KILLING YOURSELF?: NO
1. IN THE PAST MONTH, HAVE YOU WISHED YOU WERE DEAD OR WISHED YOU COULD GO TO SLEEP AND NOT WAKE UP?: NO

## 2025-03-10 ASSESSMENT — LIFESTYLE VARIABLES
SKIP TO QUESTIONS 9-10: 1
AUDIT-C TOTAL SCORE: 1

## 2025-03-10 NOTE — DISCHARGE INSTRUCTIONS
Accepted to [facility] by hospitalist , via transfer center after Case discussed with Lilliana Sahni, nurse practitioner for GI service.  Patient agreed

## 2025-03-10 NOTE — CONSULTS
Reason For Consult  LGIB, acute on chronic anemia, on Eliquis 2.5 mg.     History Of Present Illness  Dianne Clay is a 85 y.o. female with PMH of CAD s/p MI, remote PCI 2003, paroxysmal A-fib recently started on 2.5 Eliquis 2 days ago, HTN, OA, hypothyroidism, GERD and PAD presenting to [facility] as a transfer from [place] for active LGIB. Hgb 10.2. down from 11.1 3/4.     Patient transfused with PRBC at [place] 2 units.     3/4/2025  Started on low dose eliquis 2 days ago after admission to [facility] for c/o exertional angina. She underwent R&LHC with Dr Queen 3/4/2025. Instructed to fill Eliquis 3/5.     12/6 patient admitted for red bloody stools with clots, on eliquis at that time.  Prior diverticular bleeding in 2024, 2023, 2022 in 2014.     9/6/2024 colonoscopy   Significant amount of pancolonic fresh blood and blood clotting  Multiple small and large, extensive pancolonic severe diverticula containing blood. Despite extensive lavage and suctioning, and re-intubation of multiple segments of the colon, no underlying culprit bleeding lesion was seen.  After extensive lavage was performed, there was no evidence of active bleeding.    Past Medical History  She has a past medical history of Adrenal nodule, Allergic contact dermatitis, unspecified cause (11/07/2015), Anemia, Arthritis, Atrial fibrillation, chronic (Multi), Contusion of unspecified knee, initial encounter (07/03/2018), Diverticular hemorrhage, Effusion, unspecified knee (03/06/2019), GERD (gastroesophageal reflux disease), Heart attack, Hypertension, Hyperthyroidism, Noninfective gastroenteritis and colitis, unspecified (04/01/2022), Noninfective gastroenteritis and colitis, unspecified, Other conditions influencing health status, PAD (peripheral artery disease) (CMS-[facility]), Pain in left knee (08/11/2014), Personal history of other diseases of the musculoskeletal system and connective tissue, Pneumonia, Rectal bleeding, Rectal ulcer, and S/P primary  "angioplasty with coronary stent.           Surgical History  She has a past surgical history that includes Colonoscopy (2014);  section, classic (2014); Cataract extraction (2014); Coronary angioplasty with stent (2014); Other surgical history (2023); Upper gastrointestinal endoscopy; Upper gastrointestinal endoscopy; Flexible sigmoidoscopy; Upper gastrointestinal endoscopy; and Cardiac catheterization (N/A, 3/4/2025).     Social History  She reports that she has never smoked. She has never been exposed to tobacco smoke. She has never used smokeless tobacco. She reports current alcohol use. She reports that she does not use drugs.    Family History  Family History   Problem Relation Name Age of Onset    Other (cardiac disorder) Mother      Other (cva) Mother      Other (cardiac disorder) Father      Other (cardiac disorder) Sister      Cancer Sister      Other (MI) Sister          Allergies  Bee venom protein (honey bee)    Review of Systems  ***     Physical Exam  ***     Last Recorded Vitals  Blood pressure 141/75, pulse 93, temperature 36.7 °C (98.1 °F), temperature source Temporal, resp. rate (!) 23, height 1.6 m (5' 3\"), weight 53.3 kg (117 lb 8.1 oz), SpO2 100%.    Relevant Results    ***     Assessment/Plan     85 y.o. female with PMH of CAD s/p MI, remote PCI , paroxysmal A-fib recently started on 2.5 Eliquis 2 days ago, HTN, OA, hypothyroidism, GERD and PAD presenting to [facility] as a transfer from [place] for active LGIB. Hgb 10.2. down from 11.1 3/4.    Past GI notes indicate patient is poor candidate for anticoagulation.    Dr Queen out of town for the week. Spoke with Cardiology NP at [facility] who recommends holding Eliquis and have patient follow up for watchman.   She has follow up appointment with Dr Queen 2025.     NPO, ok for sips with chips and meds  Daily CBC   Consider colonoscopy with continued BRBPR and drop in hgb       Lilliana LEW" Bora, JEREMY-CNP

## 2025-03-10 NOTE — ASSESSMENT & PLAN NOTE
GI bleed  H&H trending down  Occult positive  Still having liquid maroon stools  PPI twice daily  Clear liquids, n.p.o. after midnight  GI consulted, appreciate recommendations  Hold Eliquis    Chronic atrial fibrillation  Started on Eliquis 2 days ago, placed on hold due to GI bleed  Continue amiodarone  Monitor on telemetry    Coronary artery disease  Status post stents in the past.  Had a heart cath a few days ago without intervention.  Continue statin  Holding Eliquis for now    Hypertension  Currently stable  Monitor close    Hyperlipidemia  Statin    Hypothyroidism  Resume home medications    GERD  PPI    Hypokalemia  Replace, repeat in the morning  Magnesium on the lower side of normal.  Will repeat in the morning and replace as needed.    Plan  Monitor on telemetry  PPI twice daily  Clear liquids, n.p.o. after midnight  Hold Eliquis  DVT prophylaxis: SCDs  Consult GI, appreciate recommendations  CBC and BMP in the morning    CODE STATUS discussed with the patient.  She is a full code.    Plan of care discussed with attending

## 2025-03-10 NOTE — ED PROVIDER NOTES
HPI   Chief Complaint   Patient presents with    Black or Bloody Stool     Pt states she was started on eliquis 2 days ago for suspected cardiac blockage. Pt states she went to have a BM this morning and she had a loose stool that was dark red. Pt denies any pain. Pt has had rectal bleeding a few times in the past, last time about 2 years ago       HPI  This 85-year-old male patient was brought to the emergency room with a complaint of rectal bleed.  Patient was started on Eliquis 2 days ago.  Possibly due to coronary artery disease.  Patient with prior history of rectal bleed.    Patient History   Past Medical History:   Diagnosis Date    Adrenal nodule     Allergic contact dermatitis, unspecified cause 2015    Allergic dermatitis    Anemia     Arthritis     Atrial fibrillation, chronic (Multi)     Contusion of unspecified knee, initial encounter 2018    Knee contusion    Diverticular hemorrhage     Effusion, unspecified knee 2019    Knee swelling    GERD (gastroesophageal reflux disease)     Heart attack     Hypertension     Hyperthyroidism     Noninfective gastroenteritis and colitis, unspecified 2022    Enteritis    Noninfective gastroenteritis and colitis, unspecified     Colitis    Other conditions influencing health status     Ulcer    PAD (peripheral artery disease) (CMS-[facility])     Pain in left knee 2014    Knee pain, left anterior    Personal history of other diseases of the musculoskeletal system and connective tissue     Personal history of arthritis    Pneumonia     Rectal bleeding     Rectal ulcer     S/P primary angioplasty with coronary stent      Past Surgical History:   Procedure Laterality Date    CARDIAC CATHETERIZATION N/A 3/4/2025    Procedure: Left & Right Heart Cath w Angiography & LV;  Surgeon: Flavio Queen MD;  Location: [facility] Cardiac Cath Lab;  Service: Cardiovascular;  Laterality: N/A;    CATARACT EXTRACTION  2014    Cataract Surgery      SECTION, CLASSIC  2014     Section    COLONOSCOPY  2014    Colonoscopy (Fiberoptic)    CORONARY ANGIOPLASTY WITH STENT PLACEMENT  2014    Cath Stent Placement    FLEXIBLE SIGMOIDOSCOPY      OTHER SURGICAL HISTORY  2023    Cholecystectomy laparoscopic    UPPER GASTROINTESTINAL ENDOSCOPY      UPPER GASTROINTESTINAL ENDOSCOPY      UPPER GASTROINTESTINAL ENDOSCOPY           Family History   Problem Relation Name Age of Onset    Other (cardiac disorder) Mother      Other (cva) Mother      Other (cardiac disorder) Father      Other (cardiac disorder) Sister      Cancer Sister      Other (MI) Sister       Social History     Tobacco Use    Smoking status: Never     Passive exposure: Never    Smokeless tobacco: Never   Vaping Use    Vaping status: Never Used   Substance Use Topics    Alcohol use: Yes     Comment: RARE beer    Drug use: Never   EKG obtained at 843 hours showed sinus rhythm with occasional PVCs, right axis deviation.  Pulmonary disease pattern.  No ST-T evaluation.  No STEMI.  Abnormal EKG.  I read this EKG  Second EKG obtained at 1009 hrs. showed normal sinus rhythm rate of 87.  Right axis deviation.  Pulmonary disease pattern.  Nonspecific T wave normality.  No ST-T evaluation.  No STEMI.  Abnormal EKG.  I read this EKG.  Physical Exam   ED Triage Vitals   Temp Pulse Resp BP   -- -- -- --      SpO2 Temp src Heart Rate Source Patient Position   -- -- -- --      BP Location FiO2 (%)     -- --       Physical Exam      ED Course & MDM   Diagnoses as of 03/10/25 1452   Acute GI bleeding   Atherosclerosis of native coronary artery of native heart without angina pectoris                 No data recorded     [place] Coma Scale Score: 15 (03/10/25 0842 : Kirti Mortensen RN)                           Medical Decision Making      Procedure  Procedures   She is not ill-appearing, toxic-appearing or diaphoretic.      Comments: Patient awake alert pleasant cooperative did not look toxic or in distress appears slightly pale.  No drooping or drooling no stridor neck is supple lungs are clear no wheezing rales noted.  She was noted a mild tachycardia no friction rub no murmur no peripheral edema good peripheral pulses calf is nontender Homans' sign negative.  Abdomen soft positive bowel sound nontender.  She did have rectal bleed when evaluated with the female RN.  This morning showed red blood per rectally.  No vaginal bleeding noted on clinical examination of the perineum and rectal area.  No easy bruisability no petechiae ecchymosis.  Goodemote sounds intact neck is supple neuro examination grossly within normal range.   HENT:      Head: Normocephalic and atraumatic.      Right Ear: External ear normal.      Left Ear: External ear normal.      Nose: Nose normal.      Mouth/Throat:      Mouth: Mucous membranes are moist.   Eyes:      Extraocular Movements: Extraocular movements intact.      Conjunctiva/sclera: Conjunctivae normal.      Pupils: Pupils are equal, round, and reactive to light.   Neck:      Vascular: No carotid bruit.   Cardiovascular:      Rate and Rhythm: Normal rate and regular rhythm.      Pulses: Normal pulses.      Heart sounds: Normal heart sounds. No murmur heard.     No friction rub. No gallop.   Pulmonary:      Effort: Pulmonary effort is normal. No respiratory distress.      Breath sounds: Normal breath sounds. No stridor. No wheezing, rhonchi or rales.   Chest:      Chest wall: No tenderness.   Abdominal:      General: Abdomen is flat. Bowel sounds are normal.      Palpations: Abdomen is soft. There is no mass.      Tenderness: There is no abdominal tenderness. There is no right CVA tenderness, left CVA tenderness, guarding or rebound.   Genitourinary:     Rectum: Guaiac result positive.   Musculoskeletal:         General: No swelling,  tenderness, deformity or signs of injury. Normal range of motion.      Cervical back: Normal range of motion and neck supple. No rigidity or tenderness.      Right lower leg: No edema.      Left lower leg: No edema.   Lymphadenopathy:      Cervical: No cervical adenopathy.   Skin:     Capillary Refill: Capillary refill takes less than 2 seconds.      Coloration: Skin is pale. Skin is not jaundiced.      Findings: Rash present. No bruising, erythema or lesion.   Neurological:      General: No focal deficit present.      Mental Status: She is alert and oriented to person, place, and time.      Cranial Nerves: No cranial nerve deficit.      Sensory: No sensory deficit.      Motor: No weakness.      Coordination: Coordination normal.      Gait: Gait normal.      Deep Tendon Reflexes: Reflexes normal.   Psychiatric:         Mood and Affect: Mood normal.         Behavior: Behavior normal.           ED Course & MDM   Diagnoses as of 03/10/25 1252   Acute GI bleeding   Atherosclerosis of native coronary artery of native heart without angina pectoris   Patient is a 85-year-old female reported emergency room with a complaint rectal bleed she has been on Eliquis.  Due to coronary artery disease denies any chest pain short of breath.  Denies any abdominal pain.  Denies dizziness lightheaded palpitation.    Upon examination he looks slightly pale and she was noted to have acute GI bleed per rectally.  However lungs are clear heart regular rate and rhythm vascular abdomen soft nontender.  Good range of motion arms and leg neck is supple no changes since.                No data recorded     [place] Coma Scale Score: 15 (03/10/25 0842 : Kirti Mortensen RN)                   CBC showed hemoglobin 10.3 hematocrit 32 her hemoglobin is 11.1 and hematocrit 35.8 last week however subsequent second CBC showed further drop in hemoglobin 8.7 hematocrit 27.2 platelet count 153 slightly lower than platelet counts.  Earlier today  There is  about 2 g drop while in the ER and she has about 3 g drop since last week.  She was an active GI bleed and required transfer to facility with a gastroenterologist.  Her chemistry showed glucose 108 sodium 139 potassium 3.2 slightly low BUN/creatinine was 13 and 0.73 with GFR 81.  INR PT was 1.31 from 14.2..  Hemoccult positive   Patient 2 set of troponin negative BNP is 162 actually she has a third troponin done by the time she left the ER and was also negative.      EKG done in the ER were negative ,showed no STEMI.  Case discussed with gastroenterology midlevel on-call send she accepted patient transfer patient was subsequent transferred to [facility] for evaluation    Gastroenterologist  Medical Decision Making      Procedure  Procedures     Yoli Berry MD  03/26/25 1830       Yoli Berry MD  03/26/25 9325       Yoli Berry MD  03/26/25 7109

## 2025-03-10 NOTE — H&P
Chief complaint: GI bleed    History Of Present Illness  Dianne Clay is a 85 y.o. female with a past medical history of GI bleeds, anemia, arthritis, atrial fibrillation, diverticular hemorrhage, GERD, MI, hypothyroidism, and hypertension who presented to the emergency department with a GI bleed.  Patient states she woke up this morning and felt as if she had to use the bathroom.  States that she almost did not make it.  When she went to the bathroom she noticed dark black bloody stools.  Patient had a recent heart cath. Dr. Queen is her cardiologist.  She was started back on Eliquis a few days ago.  She denies any nausea, vomiting, or abdominal discomfort.  No fevers, chills, or chest pain.  She does report some shortness of breath with exertion.  Patient had an episode of feeling lightheaded as if she was going to pass out when getting up to the bathroom upon admission.  Noted large liquid maroon stool. Assisted back into bed. Bedrest for now.    In the ED: Glucose was 108.  Sodium 139.  Potassium 3.3.  BUN/creatinine 13/0.73.  Magnesium 1.75.  Troponins were negative x 3.  BNP was mildly elevated at 162.  Lactic acid was 1.0.  WBC was 7.2.  Initial H&H was 10.3/32.6, repeat at 1500 was 8.7/29.2.  Platelets are 153.  Occult stool positive.  Patient was transferred to [facility] for GI consult and admission by hospitalist for further evaluation and treatment.    Past Medical History  She has a past medical history of Adrenal nodule, Allergic contact dermatitis, unspecified cause (11/07/2015), Anemia, Arthritis, Atrial fibrillation, chronic (Multi), Contusion of unspecified knee, initial encounter (07/03/2018), Diverticular hemorrhage, Effusion, unspecified knee (03/06/2019), GERD (gastroesophageal reflux disease), Heart attack, Hypertension, Hyperthyroidism, Noninfective gastroenteritis and colitis, unspecified (04/01/2022), Noninfective gastroenteritis and colitis, unspecified, Other conditions  influencing health status, PAD (peripheral artery disease) (CMS-HCC), Pain in left knee (2014), Personal history of other diseases of the musculoskeletal system and connective tissue, Pneumonia, Rectal bleeding, Rectal ulcer, and S/P primary angioplasty with coronary stent.    Surgical History  She has a past surgical history that includes Colonoscopy (2014);  section, classic (2014); Cataract extraction (2014); Coronary angioplasty with stent (2014); Other surgical history (2023); Upper gastrointestinal endoscopy; Upper gastrointestinal endoscopy; Flexible sigmoidoscopy; Upper gastrointestinal endoscopy; and Cardiac catheterization (N/A, 3/4/2025).     Social History  She reports that she has never smoked. She has never been exposed to tobacco smoke. She has never used smokeless tobacco. She reports current alcohol use. She reports that she does not use drugs.    Family History  Family History   Problem Relation Name Age of Onset    Other (cardiac disorder) Mother      Other (cva) Mother      Other (cardiac disorder) Father      Other (cardiac disorder) Sister      Cancer Sister      Other (MI) Sister          Allergies  Bee venom protein (honey bee)    Review of Systems   Constitutional:  Negative for appetite change, fatigue and fever.   HENT:  Negative for congestion and rhinorrhea.    Respiratory:  Positive for shortness of breath. Negative for cough and chest tightness.    Cardiovascular:  Negative for chest pain and palpitations.   Gastrointestinal:  Positive for blood in stool and diarrhea. Negative for abdominal distention, abdominal pain, constipation, nausea and vomiting.   Genitourinary:  Negative for dysuria and urgency.   Neurological:  Positive for light-headedness. Negative for dizziness and numbness.   All other systems reviewed and are negative.       Physical Exam  Vitals reviewed.   Constitutional:       Appearance: Normal appearance.   HENT:      Head:  Normocephalic and atraumatic.      Nose: Nose normal.      Mouth/Throat:      Mouth: Mucous membranes are moist.   Eyes:      Extraocular Movements: Extraocular movements intact.      Conjunctiva/sclera: Conjunctivae normal.   Cardiovascular:      Rate and Rhythm: Normal rate and regular rhythm.   Pulmonary:      Effort: Pulmonary effort is normal.      Breath sounds: Normal breath sounds. No wheezing, rhonchi or rales.   Abdominal:      General: Bowel sounds are normal.      Palpations: Abdomen is soft.      Tenderness: There is no abdominal tenderness.   Musculoskeletal:         General: Normal range of motion.      Cervical back: Normal range of motion and neck supple.   Skin:     General: Skin is warm and dry.      Capillary Refill: Capillary refill takes less than 2 seconds.   Neurological:      General: No focal deficit present.      Mental Status: She is alert and oriented to person, place, and time.   Psychiatric:         Mood and Affect: Mood normal.         Behavior: Behavior normal.          Last Recorded Vitals  There were no vitals taken for this visit.    Relevant Results  Lab Results   Component Value Date    GLUCOSE 108 (H) 03/10/2025    CALCIUM 8.7 03/10/2025     03/10/2025    K 3.3 (L) 03/10/2025    CO2 26 03/10/2025     03/10/2025    BUN 13 03/10/2025    CREATININE 0.73 03/10/2025      Lab Results   Component Value Date    WBC 7.2 03/10/2025    HGB 8.7 (L) 03/10/2025    HCT 29.2 (L) 03/10/2025    MCV 91 03/10/2025     03/10/2025    ECG 12 lead    Result Date: 3/10/2025  Normal sinus rhythm Right axis deviation Pulmonary disease pattern Nonspecific ST abnormality Abnormal ECG When compared with ECG of 10-MAR-2025 08:43, (unconfirmed) Premature ventricular complexes are no longer Present    Cardiac Catheterization Procedure    Result Date: 3/4/2025   [facility], Cath Lab, [address] [place] [address] Cardiovascular Catheterization Report Patient Name:       JUANIS CASTRO        Performing Physician: [id number] Queen MD Study Date:        3/4/2025             Verifying Physician:  Michelle Queen MD MRN/PID:           [id number]             Primary Physician:    Nadia Carbajal DO Accession#:        [id number]         Ordering Provider:    Michelle QUEEN Date of Birth/Age: 1940 / 85 years Cardiologist:         Michelle Queen MD Gender:            F                    Fellow:               [id number] Jorge Sandoval MD Encounter#:        [id number]  Study: Right and Left Heart Cath  Indications: JUANIS CASTRO is a 85 year old female who presents with coronary artery disease, prior myocardial infarction, prior percutaneous coronary intervention, atrial fibrillation, dyslipidemia, hypertension, peripheral artery disease and an anginal equivalent chest pain assessment (i.e. dyspnea on exertion believed to be from ischemia). Stable angina. Pulmonary hypertension. Medical History: Stress test performed: No. CTA performed: No. [facility] accessed: No. LVEF Assessed: Yes. LVEF = 55%. Cardiac arrest: No. Cardiac surgical consult: No. Cardiovascular instability: No. Frailty status of patient entering lab: 6 = Moderately frail.  Procedure Description: After infiltration with 2% Lidocaine, the right radial artery was cannulated with a modified Seldinger technique. Subsequently a 6 Bulgarian sheath was placed retrograde in the right radial artery. Selective coronary catheterization was performed using a 5 Fr catheter(s) exchanged over a guide wire to cannulate the  coronary arteries. A 5 Fr Tiger catheter was used for left and right coronary artery injections. Cardiac output was calculated via the Lazaro method. After completion of the procedure, the arterial sheath was pulled and a TR Band Radial Compression Device was utilized to obtain patent hemostasis. Post-procedure, the venous sheath was pulled and pressure was applied to the site.  Coronary Angiography: The coronary circulation is right dominant.  Left Main Coronary Artery: The left main coronary artery is a normal caliber vessel. The left main arises normally from the left coronary sinus of Valsalva and bifurcates into the LAD and circumflex coronary arteries. The left main coronary artery showed no significant disease or stenosis greater than 30%.  Left Anterior Descending Coronary Artery Distribution: The left anterior descending coronary artery is a normal caliber vessel. The LAD arises normally from the left main coronary artery. The LAD demonstrated no significant disease or stenosis greater than 30%. The proximal 1st diagonal branch revealed 50% stenosis. The 2nd diagonal branch demonstrated no significant disease or stenosis greater than 30%.  Circumflex Coronary Artery Distribution: The circumflex coronary artery is a normal caliber vessel. The circumflex arises normally from the left main coronary artery and terminates in the AV groove. The circumflex revealed no significant disease or stenosis greater than 30%. The 1st obtuse marginal branch showed no significant disease or stenosis greater than 30%. The 2nd obtuse marginal branch demonstrated no significant disease or stenosis greater than 30%.  Right Heart Catheterization: Cardiac output was calculated via the Lazaro method. Cardiac output is normal. Pulmonary arterial hypertension. Right heart cath hemodynamics: RA 5, RV 46/1 (RVEDP 7), PA 50/16 (mean 30), PCWP 13, PA sat 64% CO/CI 4.49/2.94.  Right Coronary Artery Distribution: The right coronary artery is a  normal caliber vessel. The RCA arises normally from the right sinus of Valsalva. The proximal to mid right coronary artery showed 80% in-stent restenosis and Excellent left to right collaterals.  Left Ventriculography: Left ventriculography showing normal systolic function with preserved EF. Does show segmental wall motion abnormality with hypokinesis of the inferior/diaphragmatic wall.  Coronary Lesion Summary: Vessel                            Stenosis                       Vessel Segment 1st Diagonal                    50% stenosis                        proximal RCA          80% in-stent restenosis and Excellent left to right proximal to mid                                 collaterals.  Complications: No in-lab complications observed.  Cardiac Cath Post Procedure Notes: Post Procedure Diagnosis: Severe ISR of prox-mid RCA stent with L-R collaters;                           otherwise, non-obstructive CAD.                           Pre-capillary pulmonary hypertension. Blood Loss:               Estimated blood loss during the procedure was 5 cc                           mls. Specimens Removed:        Number of specimen(s) removed: none. ____________________________________________________________________________________ CONCLUSIONS:  1. Severe ISR of prox-mid RCA stent with L-R collaterals; Non-obstructive atherosclerosis of the left system.  2. Mild to moderate Pre-capillary pulmoanry hypertension .  3. Outpatient cardiology follow up.  4. Regular follow up with Dr Carbajal. ICD 10 Codes: Shortness of breath-R06.02; Dyspnea, unspecified-R06.00  CPT Codes: Right Heart Cath O2/Cardiac output without biopsy ([facility])-33157; Left Heart Cath (visualization of coronaries) and LV-88178  [id number] Flavio Queen MD Performing Physician Electronically signed by [id number] Flavio Queen MD on 3/4/2025 at 7:12:59 PM  ** Final **        Assessment/Plan   Assessment & Plan  Gastrointestinal hemorrhage    GI bleed  H&H  trending down  Occult positive  Still having liquid maroon stools  PPI twice daily  Clear liquids, n.p.o. after midnight  GI consulted, appreciate recommendations  Hold Eliquis    Chronic atrial fibrillation  Started on Eliquis 2 days ago, placed on hold due to GI bleed  Continue amiodarone  Monitor on telemetry    Coronary artery disease  Status post stents in the past.  Had a heart cath a few days ago without intervention.  Continue statin  Holding Eliquis for now    Hypertension  Currently stable  Monitor close    Hyperlipidemia  Statin    Hypothyroidism  Resume home medications    GERD  PPI    Hypokalemia  Replace, repeat in the morning  Magnesium on the lower side of normal.  Will repeat in the morning and replace as needed.    Plan  Monitor on telemetry  PPI twice daily  Clear liquids, n.p.o. after midnight  Hold Eliquis  DVT prophylaxis: SCDs  Consult GI, appreciate recommendations  CBC and BMP in the morning    CODE STATUS discussed with the patient.  She is a full code.    Plan of care discussed with attending                 Brenda Arguello, APRN-CNP

## 2025-03-10 NOTE — ED TRIAGE NOTES
Pt states she was started on eliquis 2 days ago for suspected cardiac blockage. Pt states she went to have a BM this morning and she had a loose stool that was dark red. Pt denies any pain. Pt has had rectal bleeding a few times in the past, last time about 2 years ago

## 2025-03-11 ENCOUNTER — APPOINTMENT (OUTPATIENT)
Dept: PRIMARY CARE | Facility: CLINIC | Age: 85
End: 2025-03-11
Payer: MEDICARE

## 2025-03-11 LAB
ANION GAP SERPL CALC-SCNC: 9 MMOL/L (ref 10–20)
ATRIAL RATE: 88 BPM
BUN SERPL-MCNC: 16 MG/DL (ref 6–23)
CALCIUM SERPL-MCNC: 7.9 MG/DL (ref 8.6–10.3)
CHLORIDE SERPL-SCNC: 107 MMOL/L (ref 98–107)
CO2 SERPL-SCNC: 25 MMOL/L (ref 21–32)
CREAT SERPL-MCNC: 0.82 MG/DL (ref 0.5–1.05)
EGFRCR SERPLBLD CKD-EPI 2021: 71 ML/MIN/1.73M*2
ERYTHROCYTE [DISTWIDTH] IN BLOOD BY AUTOMATED COUNT: 16.8 % (ref 11.5–14.5)
GLUCOSE SERPL-MCNC: 94 MG/DL (ref 74–99)
HCT VFR BLD AUTO: 23.8 % (ref 36–46)
HGB BLD-MCNC: 7.6 G/DL (ref 12–16)
MAGNESIUM SERPL-MCNC: 1.74 MG/DL (ref 1.6–2.4)
MCH RBC QN AUTO: 27.3 PG (ref 26–34)
MCHC RBC AUTO-ENTMCNC: 31.9 G/DL (ref 32–36)
MCV RBC AUTO: 86 FL (ref 80–100)
NRBC BLD-RTO: 0 /100 WBCS (ref 0–0)
P AXIS: 52 DEGREES
P OFFSET: 162 MS
P ONSET: 128 MS
PLATELET # BLD AUTO: 161 X10*3/UL (ref 150–450)
POTASSIUM SERPL-SCNC: 3.5 MMOL/L (ref 3.5–5.3)
PR INTERVAL: 180 MS
Q ONSET: 218 MS
QRS COUNT: 15 BEATS
QRS DURATION: 84 MS
QT INTERVAL: 416 MS
QTC CALCULATION(BAZETT): 503 MS
QTC FREDERICIA: 472 MS
R AXIS: 152 DEGREES
RBC # BLD AUTO: 2.78 X10*6/UL (ref 4–5.2)
SODIUM SERPL-SCNC: 137 MMOL/L (ref 136–145)
T AXIS: 37 DEGREES
T OFFSET: 426 MS
VENTRICULAR RATE: 88 BPM
WBC # BLD AUTO: 6.4 X10*3/UL (ref 4.4–11.3)

## 2025-03-11 PROCEDURE — 85014 HEMATOCRIT: CPT | Performed by: NURSE PRACTITIONER

## 2025-03-11 PROCEDURE — 84132 ASSAY OF SERUM POTASSIUM: CPT | Performed by: NURSE PRACTITIONER

## 2025-03-11 PROCEDURE — 99221 1ST HOSP IP/OBS SF/LOW 40: CPT | Performed by: INTERNAL MEDICINE

## 2025-03-11 PROCEDURE — 36415 COLL VENOUS BLD VENIPUNCTURE: CPT | Performed by: NURSE PRACTITIONER

## 2025-03-11 PROCEDURE — 1200000002 HC GENERAL ROOM WITH TELEMETRY DAILY

## 2025-03-11 PROCEDURE — 83735 ASSAY OF MAGNESIUM: CPT | Performed by: NURSE PRACTITIONER

## 2025-03-11 PROCEDURE — 2500000002 HC RX 250 W HCPCS SELF ADMINISTERED DRUGS (ALT 637 FOR MEDICARE OP, ALT 636 FOR OP/ED): Performed by: NURSE PRACTITIONER

## 2025-03-11 PROCEDURE — 2500000001 HC RX 250 WO HCPCS SELF ADMINISTERED DRUGS (ALT 637 FOR MEDICARE OP): Performed by: NURSE PRACTITIONER

## 2025-03-11 PROCEDURE — 2500000004 HC RX 250 GENERAL PHARMACY W/ HCPCS (ALT 636 FOR OP/ED): Performed by: NURSE PRACTITIONER

## 2025-03-11 PROCEDURE — 85027 COMPLETE CBC AUTOMATED: CPT | Performed by: NURSE PRACTITIONER

## 2025-03-11 PROCEDURE — 99233 SBSQ HOSP IP/OBS HIGH 50: CPT | Performed by: INTERNAL MEDICINE

## 2025-03-11 RX ADMIN — AMIODARONE HYDROCHLORIDE 200 MG: 200 TABLET ORAL at 09:44

## 2025-03-11 RX ADMIN — METHIMAZOLE 5 MG: 5 TABLET ORAL at 09:44

## 2025-03-11 RX ADMIN — PANTOPRAZOLE SODIUM 40 MG: 40 INJECTION, POWDER, FOR SOLUTION INTRAVENOUS at 20:47

## 2025-03-11 RX ADMIN — FERROUS SULFATE TAB 325 MG (65 MG ELEMENTAL FE) 325 MG: 325 (65 FE) TAB at 09:44

## 2025-03-11 RX ADMIN — ATORVASTATIN CALCIUM 40 MG: 40 TABLET, FILM COATED ORAL at 09:44

## 2025-03-11 RX ADMIN — PANTOPRAZOLE SODIUM 40 MG: 40 INJECTION, POWDER, FOR SOLUTION INTRAVENOUS at 09:44

## 2025-03-11 ASSESSMENT — ENCOUNTER SYMPTOMS
ANAL BLEEDING: 1
ABDOMINAL DISTENTION: 0
ABDOMINAL PAIN: 0
NAUSEA: 0
VOMITING: 0
DIARRHEA: 1
BLOOD IN STOOL: 1

## 2025-03-11 ASSESSMENT — COGNITIVE AND FUNCTIONAL STATUS - GENERAL
CLIMB 3 TO 5 STEPS WITH RAILING: A LITTLE
MOBILITY SCORE: 22
CLIMB 3 TO 5 STEPS WITH RAILING: A LITTLE
DAILY ACTIVITIY SCORE: 24
DAILY ACTIVITIY SCORE: 24
WALKING IN HOSPITAL ROOM: A LITTLE
MOBILITY SCORE: 23

## 2025-03-11 ASSESSMENT — ACTIVITIES OF DAILY LIVING (ADL): LACK_OF_TRANSPORTATION: NO

## 2025-03-11 ASSESSMENT — PAIN - FUNCTIONAL ASSESSMENT: PAIN_FUNCTIONAL_ASSESSMENT: 0-10

## 2025-03-11 ASSESSMENT — PAIN SCALES - GENERAL
PAINLEVEL_OUTOF10: 0 - NO PAIN
PAINLEVEL_OUTOF10: 0 - NO PAIN

## 2025-03-11 NOTE — CARE PLAN
The patient's goals for the shift include      The clinical goals for the shift include Remain HDS this shift

## 2025-03-11 NOTE — CARE PLAN
The patient's goals for the shift include  have less bowel movements throughout the night    The clinical goals for the shift include patient's vital signs will remain WNL throughout shift

## 2025-03-11 NOTE — PROGRESS NOTES
[facility] Hospitalist Progress Note       [phone]: Please page me for patient care issues.  [phone]: Please page night hospitalist for any issues.     Dianne Clay  :  1940(85 y.o.)  MRN:  [id number]  PCP: Jyoti Carbajal DO  LOS: 1  day(s) since admission    Assessment & Plan  Gastrointestinal hemorrhage      Assessment and Plan:     Dianne Clay is a 85 y.o. female with a past medical history of GI bleeds, anemia, arthritis, atrial fibrillation, diverticular hemorrhage, GERD, MI, hypothyroidism, and hypertension who presented to the emergency department with a GI bleed.  Patient states she woke up this morning and felt as if she had to use the bathroom.  States that she almost did not make it.  When she went to the bathroom she noticed dark black bloody stools.  Patient had a recent heart cath. Dr. Queen is her cardiologist.  She was started back on Eliquis a few days ago.  She denies any nausea, vomiting, or abdominal discomfort.  No fevers, chills, or chest pain.  She does report some shortness of breath with exertion.  Patient had an episode of feeling lightheaded as if she was going to pass out when getting up to the bathroom upon admission.  Noted large liquid maroon stool. Assisted back into bed. Bedrest for now.     In the ED: Glucose was 108.  Sodium 139.  Potassium 3.3.  BUN/creatinine 13/0.73.  Magnesium 1.75.  Troponins were negative x 3.  BNP was mildly elevated at 162.  Lactic acid was 1.0.  WBC was 7.2.  Initial H&H was 10.3/32.6, repeat at 1500 was 8.7/29.2.  Platelets are 153.  Occult stool positive.  Patient was transferred to [facility] for GI consult and admission by hospitalist for further evaluation and treatment.    #Recurrent GIB due to OAC  #ABLA  #Chronic Afib  #CAD s/p RICARDO  #HTN  #Hypothyroidism  -GIB order set, trend H/H  -PPI  -hold home apixaban  -currently no plans for intervention by GI  -needs to F/U with primary cardiology for watchman device eval  -CS  notes reviewed and recs appreciated:GI     Disposition: await test results, await consultant recommendations, and await clinical improvement    DVT Prophylaxis: SCDs    Code status: Full Code  Diet: Adult diet Regular    Level of MDM:  High    discussed with nurse, discussed with TCC/SW, I personally examined the patient, and I personally reviewed chart, data, labs radiology reports    Family Communication  Number :   Name of Designated Family Representative:       Total time spent: 50 minutes, of total time providing counseling or in coordination of care. Total time on this day of visit includes record and documentation review before and after visit including documentation and time not explicitly included on EMR time stamp      Subjective:   Interval History:  HPI  The patient complains of Anemia  The patient feels their symptoms areimproving  no events or new concerns    Scheduled Meds:amiodarone, 200 mg, oral, Daily  atorvastatin, 40 mg, oral, Daily  ferrous sulfate, 1 tablet, oral, Daily  methIMAzole, 5 mg, oral, Daily  pantoprazole, 40 mg, intravenous, BID      Continuous Infusions:   PRN Meds:PRN medications: acetaminophen **OR** acetaminophen **OR** acetaminophen, acetaminophen **OR** acetaminophen **OR** acetaminophen, ondansetron **OR** ondansetron    Review of Systems   All other systems reviewed and are negative.    Interval Pertinent History:  Social History     Tobacco Use    Smoking status: Never     Passive exposure: Never    Smokeless tobacco: Never   Substance Use Topics    Alcohol use: Yes     Comment: RARE beer         Objective:   Patient Vitals for the past 24 hrs:   BP Temp Temp src Pulse Resp SpO2   03/11/25 0835 143/69 36.8 °C (98.2 °F) Temporal 92 18 97 %   03/11/25 0533 107/55 36.5 °C (97.7 °F) Temporal 83 14 97 %   03/10/25 2349 104/54 36.6 °C (97.9 °F) Temporal 87 15 96 %   03/10/25 2019 109/62 36.7 °C (98.1 °F) Temporal 94 16 98 %       Average, Min, and Max for last 24 hours  Vitals:  TEMPERATURE:  Temp  Av.7 °C (98 °F)  Min: 36.5 °C (97.7 °F)  Max: 36.8 °C (98.2 °F)    RESPIRATIONS RANGE: Resp  Av.8  Min: 14  Max: 24    PULSE RANGE: Pulse  Av.7  Min: 70  Max: 94    BLOOD PRESSURE RANGE:  Systolic (24hrs), Av , Min:104 , Max:165   ; Diastolic (24hrs), Av, Min:54, Max:85      PULSE OXIMETRY RANGE: SpO2  Av.3 %  Min: 96 %  Max: 100 %  There is no height or weight on file to calculate BMI.    I/O last 3 completed shifts:  In: 480 [P.O.:480]  Out: -   Weight change:      Physical Exam  Vitals and nursing note reviewed.   Constitutional:       Appearance: Normal appearance.   HENT:      Head: Normocephalic and atraumatic.      Right Ear: External ear normal.      Left Ear: External ear normal.      Nose: Nose normal.      Mouth/Throat:      Mouth: Mucous membranes are moist.   Eyes:      General: No scleral icterus.        Right eye: No discharge.         Left eye: No discharge.      Extraocular Movements: Extraocular movements intact.      Conjunctiva/sclera: Conjunctivae normal.      Pupils: Pupils are equal, round, and reactive to light.   Cardiovascular:      Rate and Rhythm: Normal rate and regular rhythm.   Pulmonary:      Effort: Pulmonary effort is normal.      Breath sounds: Normal breath sounds.   Abdominal:      General: Abdomen is flat. Bowel sounds are normal.      Palpations: Abdomen is soft.   Musculoskeletal:         General: Normal range of motion.      Right lower leg: No edema.      Left lower leg: No edema.   Skin:     General: Skin is warm and dry.      Capillary Refill: Capillary refill takes less than 2 seconds.   Neurological:      General: No focal deficit present.   Psychiatric:         Mood and Affect: Mood normal.         Thought Content: Thought content normal.         Judgment: Judgment normal.         Lab Results   Component Value Date     2025    K 3.5 2025     2025    CO2 25 2025    BUN 16  03/11/2025    CREATININE 0.82 03/11/2025    GLUCOSE 94 03/11/2025    CALCIUM 7.9 (L) 03/11/2025    PROT 8.7 (H) 03/10/2025    BILITOT 0.6 03/10/2025    ALKPHOS 80 03/10/2025    AST 18 03/10/2025    ALT 10 03/10/2025       Lab Results   Component Value Date    WBC 6.4 03/11/2025    HGB 7.6 (L) 03/11/2025    HGB 7.6 (L) 03/11/2025    HCT 23.8 (L) 03/11/2025    HCT 23.8 (L) 03/11/2025    MCV 86 03/11/2025     03/11/2025    LYMPHOPCT 12.7 03/10/2025    RBC 2.78 (L) 03/11/2025    MCH 27.3 03/11/2025    MCHC 31.9 (L) 03/11/2025    RDW 16.8 (H) 03/11/2025       Lab Results   Component Value Date    TSH 0.51 01/14/2025     Lab Results   Component Value Date    LACTATE 1.0 03/10/2025    TROPONINI 0.12 (HH) 11/29/2021     (H) 03/10/2025    DDIMER 272 09/30/2019    INR 1.3 (H) 03/10/2025       IMAGES:  Encounter Date: 03/10/25   ECG 12 lead   Result Value    Ventricular Rate 87    Atrial Rate 87    OK Interval 176    QRS Duration 82    QT Interval 414    QTC Calculation(Bazett) 498    P Axis 46    R Axis 151    T Axis 51    QRS Count 14    Q Onset 228    P Onset 140    P Offset 166    T Offset 435    QTC Fredericia 468    Narrative    Normal sinus rhythm  Right axis deviation  Pulmonary disease pattern  Nonspecific ST abnormality  Abnormal ECG  When compared with ECG of 10-MAR-2025 08:43, (unconfirmed)  Premature ventricular complexes are no longer Present  See ED provider note for full interpretation and clinical correlation  Confirmed by Marleni Finley ([id number]) on 3/10/2025 10:05:47 PM        Transthoracic Echo Complete    Result Date: 1/14/2025   [facility], [address] [place] [address]              Tel [phone] and Fax [phone] TRANSTHORACIC ECHOCARDIOGRAM REPORT  Patient Name:       JUANIS Lewis Physician:    [id number] Flavio Queen MD Study Date:         1/14/2025           Ordering Provider:     [id number] [facility] Hospital[facility] ROY MRN/PID:            [id number]            Fellow: Accession#:         [id number]        Nurse:                Lilliana Pérez RN Date of Birth/Age:  1940 / 84      Sonographer:          Jessica arizmendi RDCS Gender assigned at  F                   Additional Staff: Birth: Height:             160.02 cm           Admit Date: Weight:             52.16 kg            Admission Status:     Outpatient BSA / BMI:          1.53 m2 / 20.37     Encounter#:           [id number]                     kg/m2 Blood Pressure:     162/83 mmHg         Department Location:  [facility] Study Type:    TRANSTHORACIC ECHO (TTE) COMPLETE Diagnosis/ICD: Permanent AFib-I48.21 Indication:    Atrial Fibrillation CPT Code:      Echo Complete w Full Doppler-[id number] Patient History: Pertinent History: A-Fib and HTN. PAD, GERD. Study Detail: The following Echo studies were performed: 2D, M-Mode, Doppler and               Strain. Agitated saline used as a contrast agent for intraseptal               flow evaluation. The patient was awake.  PHYSICIAN INTERPRETATION: Left Ventricle: Left ventricular ejection fraction is normal, by visual estimate at 55-60%. There is mild concentric left ventricular hypertrophy. There are no regional left ventricular wall motion abnormalities. The left ventricular cavity size is normal. There is left ventricular concentric remodeling. Spectral Doppler shows a Grade I (impaired relaxation pattern) of left ventricular diastolic filling with normal left atrial filling pressure. Left Atrium: The left atrium is mildly dilated. Right Ventricle: The right ventricle is normal in size. There is normal right ventricular global systolic function. Right Atrium: The right atrium is normal in size. Aortic Valve: The aortic valve is probably trileaflet. There is mild aortic valve  cusp calcification. There is mild to moderate aortic valve thickening. There is evidence of mildly elevated transaortic gradients consistent with sclerosis of the aortic valve. There is no evidence of aortic valve regurgitation. The peak instantaneous gradient of the aortic valve is 12 mmHg. Mitral Valve: The mitral valve is normal in structure. There is mild to moderate mitral annular calcification. There is mild to moderate mitral valve regurgitation. The mitral regurgitant orifice area is 21 mm2. The mitral regurgitant volume is 38.26 ml. Tricuspid Valve: The tricuspid valve is structurally normal. There is mild tricuspid regurgitation. Pulmonic Valve: The pulmonic valve is not well visualized. There is physiologic pulmonic valve regurgitation. Pericardium: There is no pericardial effusion noted. Aorta: The aortic root was not well visualized.  CONCLUSIONS:  1. Left ventricular ejection fraction is normal, by visual estimate at 55-60%.  2. Spectral Doppler shows a Grade I (impaired relaxation pattern) of left ventricular diastolic filling with normal left atrial filling pressure.  3. There is normal right ventricular global systolic function.  4. The left atrium is mildly dilated.  5. Mild to moderate mitral valve regurgitation.  6. Aortic valve sclerosis. QUANTITATIVE DATA SUMMARY:  2D MEASUREMENTS:          Normal Ranges: Ao Root d:       3.50 cm  (2.0-3.7cm) LAs:             2.80 cm  (2.7-4.0cm) IVSd:            1.01 cm  (0.6-1.1cm) LVPWd:           1.10 cm  (0.6-1.1cm) LVIDd:           3.73 cm  (3.9-5.9cm) LVIDs:           2.44 cm LV Mass Index:   81 g/m2 LVEDV Index:     71 ml/m2 LV % FS          34.6 %  LA VOLUME:                    Normal Ranges: LA Vol A4C:        50.3 ml    (22+/-6mL/m2) LA Vol A2C:        40.8 ml LA Vol BP:         45.3 ml LA Vol Index A4C:  32.9ml/m2 LA Vol Index A2C:  26.7 ml/m2 LA Vol Index BP:   29.6 ml/m2 LA Area A4C:       19.3 cm2 LA Area A2C:       17.4 cm2 LA Major Axis A4C:  6.3 cm LA Major Axis A2C: 6.3 cm LA Volume Index:   28.0 ml/m2  RA VOLUME BY A/L METHOD:            Normal Ranges: RA Vol A4C:              35.5 ml    (8.3-19.5ml) RA Vol Index A4C:        23.2 ml/m2 RA Area A4C:             15.7 cm2 RA Major Axis A4C:       5.9 cm  M-MODE MEASUREMENTS:         Normal Ranges: Ao Root:             3.30 cm (2.0-3.7cm) LAs:                 4.10 cm (2.7-4.0cm)  AORTA MEASUREMENTS:         Normal Ranges: Asc Ao, d:          3.00 cm (2.1-3.4cm)  LV SYSTOLIC FUNCTION BY 2D PLANIMETRY (MOD):                      Normal Ranges: EF-A4C View:    62 % (>=55%) EF-A2C View:    64 % EF-Biplane:     64 % EF-Visual:      58 % LV EF Reported: 58 %  LV DIASTOLIC FUNCTION:             Normal Ranges: MV Peak E:             1.01 m/s    (0.7-1.2 m/s) MV Peak A:             1.21 m/s    (0.42-0.7 m/s) E/A Ratio:             0.83        (1.0-2.2) MV e'                  0.069 m/s   (>8.0) MV lateral e'          0.09 m/s MV medial e'           0.05 m/s MV A Dur:              143.00 msec E/e' Ratio:            14.62       (<8.0) PulmV Sys Masood:         62.50 cm/s PulmV Irving Masood:        49.30 cm/s PulmV S/D Masood:         1.30  MITRAL VALVE:          Normal Ranges: MV DT:        211 msec (150-240msec)  MITRAL INSUFFICIENCY:             Normal Ranges: PISA Radius:          0.6 cm MR VTI:               184.00 cm MR Vmax:              563.00 cm/s MR Alias Masood:         61.6 cm/s MR Volume:            38.26 ml MR Flow Rt:           117.08 ml/s MR EROA:              21 mm2  AORTIC VALVE:            Normal Ranges: AoV Vmax:      1.75 m/s  (<=1.7m/s) AoV Peak P.2 mmHg (<20mmHg) LVOT Max Masood:  1.13 m/s  (<=1.1m/s) LVOT VTI:      23.20 cm LVOT Diameter: 1.90 cm   (1.8-2.4cm) AoV Area,Vmax: 1.83 cm2  (2.5-4.5cm2)  RIGHT VENTRICLE: RV Basal 3.20 cm RV Mid   1.80 cm RV Major 7.1 cm TAPSE:   17.0 mm RV s'    0.11 m/s  TRICUSPID VALVE/RVSP:          Normal Ranges: Peak TR Velocity:     2.13 m/s RV Syst Pressure:     21  mmHg  (< 30mmHg) IVC Diam:             1.70 cm  PULMONIC VALVE:          Normal Ranges: PV Max Masood:     1.1 m/s  (0.6-0.9m/s) PV Max P.6 mmHg  Pulmonary Veins: PulmV Irving Masood: 49.30 cm/s PulmV S/D Masood:  1.30 PulmV Sys Masood:  62.50 cm/s  [id number] Flavio Queen MD Electronically signed on 2025 at 5:09:17 PM  ** Final **    === 20 ===    - Impression -  No acute osseous abnormality of the left foot.  === 24 ===    CT ABDOMEN PELVIS ANGIOGRAM W AND/OR WO IV CONTRAST    - Impression -  No aortic aneurysm or dissection.    Diffuse colonic wall thickening, which may be seen in the setting of  colitis. No definite CTA evidence for active intraluminal bleeding.    Gastric wall thickening. Correlate for symptoms of gastritis.    Calcified uterine fibroids.    Indeterminate 2 cm left adrenal nodule, which is similar in size  compared to prior recent imaging 2024. Further characterization  can be obtained with nonemergent MRI..    MACRO:  None.    Signed by: Evan Finkelstein 2024 4:13 AM  Dictation workstation:   [id number]  === 20 ===    - Impression -  No acute osseous abnormality of the left foot.  === 13 ===    MRI ORBIT FACE NECK W WO CONTRAST    - Impression -  There is mild age-appropriate diffuse brain parenchymal volume loss.    There are minimal nonspecific white matter changes within the  cerebral hemispheres bilaterally as well as a few small foci of  increased signal on the FLAIR and T2 weighted images noted within the  jami which while nonspecific, given the patient's age, likely  represent sequelae of more remote small vessel ischemic change..    No abnormal intracranial mass lesion or abnormal intracranial  enhancement is identified on the postcontrast images.    The high-resolution images through the orbits suggest mild bilateral  exophthalmos. Correlation with physical examination is recommended.  The extraocular muscles are borderline in size within the  orbits  bilaterally. No discrete abnormal intraorbital mass lesion is noted.  No abnormal signal is identified along the optic nerves. There is a  prosthetic lens in the left globe compatible with previous cataract  surgery.    There are mild inflammatory changes noted within the paranasal  sinuses.    The study was interpreted at [facility].    Additional results of the last 24 hours have been reviewed.    Dictated using [employer] Version 2.4  Proof read however unrecognized voice recognition errors may have occurred     Electronically signed by Sunshine Yañez DO on 03/11/25 at 10:55 AM

## 2025-03-11 NOTE — CONSULTS
Reason For Consult  GIB     History Of Present Illness  Dianne Clay is a Dianne Clay is a 85 y.o. female with PMH of CAD s/p MI, remote PCI 2003, paroxysmal A-fib recently started on 2.5 Eliquis 2 days ago, HTN, OA, hypothyroidism, GERD and PAD presenting to [facility] as a transfer from [place] for active LGIB. Hgb  in ED10.3,  down from 11.1 3/4.    Today Hgb 7.6<7.6<8.7<8.7  Patient transfused with 2 units PRBC at [place].    Upon assessment patient states she has not had a bloody BM since yesterday evening.  Denies abdominal or epigastric pain or cramping, N/V, chills, fever.   CTA notes colonic and gastric wall thickening.   3/4/2025  Started on low dose eliquis 2 days ago after admission to [facility] for c/o exertional angina. She underwent R&LHC with Dr Queen 3/4/2025. Instructed to fill Eliquis 3/5.      12/6 patient admitted for red bloody stools with clots, on eliquis at that time.  Prior diverticular bleeding in 2024, 2023, 2022 in 2014.      9/6/2024 colonoscopy   Significant amount of pancolonic fresh blood and blood clotting  Multiple small and large, extensive pancolonic severe diverticula containing blood. Despite extensive lavage and suctioning, and re-intubation of multiple segments of the colon, no underlying culprit bleeding lesion was seen.  After extensive lavage was performed, there was no evidence of active bleeding.      Past Medical History  She has a past medical history of Adrenal nodule, Allergic contact dermatitis, unspecified cause (11/07/2015), Anemia, Arthritis, Atrial fibrillation, chronic (Multi), Contusion of unspecified knee, initial encounter (07/03/2018), Diverticular hemorrhage, Effusion, unspecified knee (03/06/2019), GERD (gastroesophageal reflux disease), Heart attack, Hypertension, Hyperthyroidism, Noninfective gastroenteritis and colitis, unspecified (04/01/2022), Noninfective gastroenteritis and colitis, unspecified, Other conditions influencing health status, PAD (peripheral  artery disease) (CMS-[facility]), Pain in left knee (2014), Personal history of other diseases of the musculoskeletal system and connective tissue, Pneumonia, Rectal bleeding, Rectal ulcer, and S/P primary angioplasty with coronary stent.    Surgical History  She has a past surgical history that includes Colonoscopy (2014);  section, classic (2014); Cataract extraction (2014); Coronary angioplasty with stent (2014); Other surgical history (2023); Upper gastrointestinal endoscopy; Upper gastrointestinal endoscopy; Flexible sigmoidoscopy; Upper gastrointestinal endoscopy; and Cardiac catheterization (N/A, 3/4/2025).     Social History  She reports that she has never smoked. She has never been exposed to tobacco smoke. She has never used smokeless tobacco. She reports current alcohol use. She reports that she does not use drugs.    Family History  Family History   Problem Relation Name Age of Onset    Other (cardiac disorder) Mother      Other (cva) Mother      Other (cardiac disorder) Father      Other (cardiac disorder) Sister      Cancer Sister      Other (MI) Sister          Allergies  Bee venom protein (honey bee)    Review of Systems  Review of Systems   Gastrointestinal:  Positive for anal bleeding, blood in stool and diarrhea. Negative for abdominal distention, abdominal pain, nausea and vomiting.   All other systems reviewed and are negative.        Physical Exam  Physical Exam  Vitals reviewed.   Constitutional:       Appearance: Normal appearance.   HENT:      Mouth/Throat:      Mouth: Mucous membranes are moist.   Eyes:      Extraocular Movements: Extraocular movements intact.      Pupils: Pupils are equal, round, and reactive to light.   Cardiovascular:      Rate and Rhythm: Regular rhythm.      Heart sounds: Normal heart sounds.   Pulmonary:      Effort: Pulmonary effort is normal.      Breath sounds: Normal breath sounds.   Abdominal:      General: Bowel sounds are  normal. There is no distension.      Palpations: Abdomen is soft.      Tenderness: There is no abdominal tenderness.   Genitourinary:     Rectum: Normal.   Skin:     General: Skin is warm.   Neurological:      Mental Status: She is alert and oriented to person, place, and time.   Psychiatric:         Behavior: Behavior normal.           Last Recorded Vitals  Blood pressure 107/55, pulse 83, temperature 36.5 °C (97.7 °F), temperature source Temporal, resp. rate 14, SpO2 97%.    Relevant Results    Results for orders placed or performed during the hospital encounter of 03/10/25 (from the past 24 hours)   Prepare Plasma: 1 Units   Result Value Ref Range    PRODUCT CODE U3676L73     Unit Number [id number]     Unit ABO AB     Unit RH POS     Dispense Status XM     Blood Expiration Date 3/15/2025  7:23:00 PM EDT     PRODUCT BLOOD TYPE 8400     UNIT VOLUME 209    Hemoglobin and hematocrit, blood   Result Value Ref Range    Hemoglobin 8.7 (L) 12.0 - 16.0 g/dL    Hematocrit 29.3 (L) 36.0 - 46.0 %   VERIFY ABO/Rh Group Test   Result Value Ref Range    ABO TYPE A     Rh TYPE POS    Type and Screen   Result Value Ref Range    ABO TYPE A     Rh TYPE POS     ANTIBODY SCREEN NEG    Hemoglobin and hematocrit, blood   Result Value Ref Range    Hemoglobin 7.6 (L) 12.0 - 16.0 g/dL    Hematocrit 23.8 (L) 36.0 - 46.0 %   CBC   Result Value Ref Range    WBC 6.4 4.4 - 11.3 x10*3/uL    nRBC 0.0 0.0 - 0.0 /100 WBCs    RBC 2.78 (L) 4.00 - 5.20 x10*6/uL    Hemoglobin 7.6 (L) 12.0 - 16.0 g/dL    Hematocrit 23.8 (L) 36.0 - 46.0 %    MCV 86 80 - 100 fL    MCH 27.3 26.0 - 34.0 pg    MCHC 31.9 (L) 32.0 - 36.0 g/dL    RDW 16.8 (H) 11.5 - 14.5 %    Platelets 161 150 - 450 x10*3/uL   Basic metabolic panel   Result Value Ref Range    Glucose 94 74 - 99 mg/dL    Sodium 137 136 - 145 mmol/L    Potassium 3.5 3.5 - 5.3 mmol/L    Chloride 107 98 - 107 mmol/L    Bicarbonate 25 21 - 32 mmol/L    Anion Gap 9 (L) 10 - 20 mmol/L    Urea Nitrogen 16 6 - 23  mg/dL    Creatinine 0.82 0.50 - 1.05 mg/dL    eGFR 71 >60 mL/min/1.73m*2    Calcium 7.9 (L) 8.6 - 10.3 mg/dL   Hemoglobin and hematocrit, blood   Result Value Ref Range    Hemoglobin 7.6 (L) 12.0 - 16.0 g/dL    Hematocrit 23.8 (L) 36.0 - 46.0 %   Magnesium   Result Value Ref Range    Magnesium 1.74 1.60 - 2.40 mg/dL      Scheduled medications  amiodarone, 200 mg, oral, Daily  atorvastatin, 40 mg, oral, Daily  ferrous sulfate, 1 tablet, oral, Daily  methIMAzole, 5 mg, oral, Daily  pantoprazole, 40 mg, intravenous, BID      Continuous medications     PRN medications  PRN medications: acetaminophen **OR** acetaminophen **OR** acetaminophen, acetaminophen **OR** acetaminophen **OR** acetaminophen, ondansetron **OR** ondansetron      ECG 12 lead    Result Date: 3/10/2025  Normal sinus rhythm Right axis deviation Pulmonary disease pattern Nonspecific ST abnormality Abnormal ECG When compared with ECG of 10-MAR-2025 08:43, (unconfirmed) Premature ventricular complexes are no longer Present See ED provider note for full interpretation and clinical correlation Confirmed by Marleni Finley ([id number]) on 3/10/2025 10:05:47 PM    Cardiac Catheterization Procedure    Result Date: 3/4/2025   [facility], Cath Lab, [address] [place] [address] Cardiovascular Catheterization Report Patient Name:      JUANIS TALLEYWITT        Performing Physician: Michelle Queen MD Study Date:        3/4/2025             Verifying Physician:  Michelle Queen MD MRN/PID:           [id number]             Primary Physician:    Nadia Carbajal DO Accession#:        [id number]         Ordering Provider:    Michelle QUEEN Date of Birth/Age:  1940 / 85 years Cardiologist:         [id number] Flavio Queen MD Gender:            F                    Fellow:               [id number] Jorge Sandoval MD Encounter#:        [id number]  Study: Right and Left Heart Cath  Indications: JUANIS CASTRO is a 85 year old female who presents with coronary artery disease, prior myocardial infarction, prior percutaneous coronary intervention, atrial fibrillation, dyslipidemia, hypertension, peripheral artery disease and an anginal equivalent chest pain assessment (i.e. dyspnea on exertion believed to be from ischemia). Stable angina. Pulmonary hypertension. Medical History: Stress test performed: No. CTA performed: No. Agatston accessed: No. LVEF Assessed: Yes. LVEF = 55%. Cardiac arrest: No. Cardiac surgical consult: No. Cardiovascular instability: No. Frailty status of patient entering lab: 6 = Moderately frail.  Procedure Description: After infiltration with 2% Lidocaine, the right radial artery was cannulated with a modified Seldinger technique. Subsequently a 6 Guyanese sheath was placed retrograde in the right radial artery. Selective coronary catheterization was performed using a 5 Fr catheter(s) exchanged over a guide wire to cannulate the coronary arteries. A 5 Fr Tiger catheter was used for left and right coronary artery injections. Cardiac output was calculated via the Lazaro method. After completion of the procedure, the arterial sheath was pulled and a TR Band Radial Compression Device was utilized to obtain patent hemostasis. Post-procedure, the venous sheath was pulled and pressure was applied to the site.  Coronary Angiography: The coronary circulation is right dominant.  Left Main Coronary Artery: The left main coronary artery is a normal caliber vessel. The left main arises normally from the left coronary sinus of Valsalva and bifurcates  into the LAD and circumflex coronary arteries. The left main coronary artery showed no significant disease or stenosis greater than 30%.  Left Anterior Descending Coronary Artery Distribution: The left anterior descending coronary artery is a normal caliber vessel. The LAD arises normally from the left main coronary artery. The LAD demonstrated no significant disease or stenosis greater than 30%. The proximal 1st diagonal branch revealed 50% stenosis. The 2nd diagonal branch demonstrated no significant disease or stenosis greater than 30%.  Circumflex Coronary Artery Distribution: The circumflex coronary artery is a normal caliber vessel. The circumflex arises normally from the left main coronary artery and terminates in the AV groove. The circumflex revealed no significant disease or stenosis greater than 30%. The 1st obtuse marginal branch showed no significant disease or stenosis greater than 30%. The 2nd obtuse marginal branch demonstrated no significant disease or stenosis greater than 30%.  Right Heart Catheterization: Cardiac output was calculated via the Lazaro method. Cardiac output is normal. Pulmonary arterial hypertension. Right heart cath hemodynamics: RA 5, RV 46/1 (RVEDP 7), PA 50/16 (mean 30), PCWP 13, PA sat 64% CO/CI 4.49/2.94.  Right Coronary Artery Distribution: The right coronary artery is a normal caliber vessel. The RCA arises normally from the right sinus of Valsalva. The proximal to mid right coronary artery showed 80% in-stent restenosis and Excellent left to right collaterals.  Left Ventriculography: Left ventriculography showing normal systolic function with preserved EF. Does show segmental wall motion abnormality with hypokinesis of the inferior/diaphragmatic wall.  Coronary Lesion Summary: Vessel                            Stenosis                       Vessel Segment 1st Diagonal                    50% stenosis                        proximal RCA          80% in-stent restenosis and  Excellent left to right proximal to mid                                 collaterals.  Complications: No in-lab complications observed.  Cardiac Cath Post Procedure Notes: Post Procedure Diagnosis: Severe ISR of prox-mid RCA stent with L-R collaters;                           otherwise, non-obstructive CAD.                           Pre-capillary pulmonary hypertension. Blood Loss:               Estimated blood loss during the procedure was 5 cc                           mls. Specimens Removed:        Number of specimen(s) removed: none. ____________________________________________________________________________________ CONCLUSIONS:  1. Severe ISR of prox-mid RCA stent with L-R collaterals; Non-obstructive atherosclerosis of the left system.  2. Mild to moderate Pre-capillary pulmoanry hypertension .  3. Outpatient cardiology follow up.  4. Regular follow up with Dr Carbajal. ICD 10 Codes: Shortness of breath-R06.02; Dyspnea, unspecified-R06.00  CPT Codes: Right Heart Cath O2/Cardiac output without biopsy (RHC)-04303; Left Heart Cath (visualization of coronaries) and LV-28439  [id number] Flavio Queen MD Performing Physician Electronically signed by [id number] Flavio Queen MD on 3/4/2025 at 7:12:59 PM  ** Final **     Assessment/Plan     85 y.o. female with PMH of CAD s/p MI, remote PCI 2003, paroxysmal A-fib recently started on 2.5 Eliquis 2 days ago, HTN, OA, hypothyroidism, GERD and PAD presenting to [facility] as a transfer from [place] for active LGIB. Hgb 10.2. down from 11.1 3/4.  Past GI notes indicate patient is poor candidate for anticoagulation.    Dr. Queen out of town for the week. Spoke with Cardiology NP at [facility] who recommends holding Eliquis and have patient follow up for watchman.   She has follow up appointment with Dr Queen 4/21/2025.      -advance diet  -daily PPI   -Daily CBC, replace hgb <7  -Consider colonoscopy with continued BRBPR and drop in Hb. May also need EGD due  to abnormal findings on imaging  -follow up with Dr Queen-cardiology  -follow up outpatient GI     GI will continue to follow   Plan discussed with Dr Joyce Sahni, APRN-CNP  I saw and evaluated the patient. I personally obtained the key and critical portions of the history and physical exam or was physically present for key and critical portions performed by the NP. I reviewed the NP's documentation and discussed the patient with the NP. I agree with the NP's medical decision making as documented in the note.

## 2025-03-11 NOTE — PROGRESS NOTES
03/11/25 0749   Discharge Planning   Living Arrangements Alone   Support Systems Family members   Assistance Needed A&OX4; independent with ADLs with no DME; doesn't drive (friends/neighbors drive); room air baseline and currently room air; PCP Dr Jyoti Carbajal; pt just started Eliquis recently   Type of Residence Private residence   Number of Stairs to Enter Residence 0   Number of Stairs Within Residence 26  (13 up / 13 down)   Do you have animals or pets at home? Yes   Type of Animals or Pets 1 Dog (neighbor is watching)   Who is requesting discharge planning? Provider   Home or Post Acute Services None   Expected Discharge Disposition Home  (DC dispo is pending surgery consult)   Does the patient need discharge transport arranged? No   Financial Resource Strain   How hard is it for you to pay for the very basics like food, housing, medical care, and heating? Not hard   Housing Stability   In the last 12 months, was there a time when you were not able to pay the mortgage or rent on time? N   In the past 12 months, how many times have you moved where you were living? 0   At any time in the past 12 months, were you homeless or living in a shelter (including now)? N   Transportation Needs   In the past 12 months, has lack of transportation kept you from medical appointments or from getting medications? no   In the past 12 months, has lack of transportation kept you from meetings, work, or from getting things needed for daily living? No

## 2025-03-12 VITALS
TEMPERATURE: 98.6 F | OXYGEN SATURATION: 98 % | DIASTOLIC BLOOD PRESSURE: 56 MMHG | RESPIRATION RATE: 20 BRPM | HEART RATE: 84 BPM | SYSTOLIC BLOOD PRESSURE: 121 MMHG

## 2025-03-12 LAB
ALBUMIN SERPL BCP-MCNC: 2.7 G/DL (ref 3.4–5)
ANION GAP SERPL CALC-SCNC: 9 MMOL/L (ref 10–20)
BASOPHILS # BLD AUTO: 0.05 X10*3/UL (ref 0–0.1)
BASOPHILS NFR BLD AUTO: 0.7 %
BLOOD EXPIRATION DATE: NORMAL
BUN SERPL-MCNC: 11 MG/DL (ref 6–23)
CALCIUM SERPL-MCNC: 7.8 MG/DL (ref 8.6–10.3)
CHLORIDE SERPL-SCNC: 106 MMOL/L (ref 98–107)
CO2 SERPL-SCNC: 26 MMOL/L (ref 21–32)
CREAT SERPL-MCNC: 0.76 MG/DL (ref 0.5–1.05)
DISPENSE STATUS: NORMAL
EGFRCR SERPLBLD CKD-EPI 2021: 77 ML/MIN/1.73M*2
EOSINOPHIL # BLD AUTO: 0.14 X10*3/UL (ref 0–0.4)
EOSINOPHIL NFR BLD AUTO: 1.9 %
ERYTHROCYTE [DISTWIDTH] IN BLOOD BY AUTOMATED COUNT: 17 % (ref 11.5–14.5)
GLUCOSE SERPL-MCNC: 96 MG/DL (ref 74–99)
HCT VFR BLD AUTO: 21.9 % (ref 36–46)
HCT VFR BLD AUTO: 26.3 % (ref 36–46)
HGB BLD-MCNC: 6.9 G/DL (ref 12–16)
HGB BLD-MCNC: 8.4 G/DL (ref 12–16)
IMM GRANULOCYTES # BLD AUTO: 0.03 X10*3/UL (ref 0–0.5)
IMM GRANULOCYTES NFR BLD AUTO: 0.4 % (ref 0–0.9)
LYMPHOCYTES # BLD AUTO: 1.24 X10*3/UL (ref 0.8–3)
LYMPHOCYTES NFR BLD AUTO: 16.6 %
MAGNESIUM SERPL-MCNC: 1.59 MG/DL (ref 1.6–2.4)
MCH RBC QN AUTO: 27.3 PG (ref 26–34)
MCHC RBC AUTO-ENTMCNC: 31.5 G/DL (ref 32–36)
MCV RBC AUTO: 87 FL (ref 80–100)
MONOCYTES # BLD AUTO: 0.57 X10*3/UL (ref 0.05–0.8)
MONOCYTES NFR BLD AUTO: 7.6 %
NEUTROPHILS # BLD AUTO: 5.43 X10*3/UL (ref 1.6–5.5)
NEUTROPHILS NFR BLD AUTO: 72.8 %
NRBC BLD-RTO: 0 /100 WBCS (ref 0–0)
PHOSPHATE SERPL-MCNC: 3 MG/DL (ref 2.5–4.9)
PLATELET # BLD AUTO: 177 X10*3/UL (ref 150–450)
POTASSIUM SERPL-SCNC: 3.7 MMOL/L (ref 3.5–5.3)
PRODUCT BLOOD TYPE: 6200
PRODUCT CODE: NORMAL
RBC # BLD AUTO: 2.53 X10*6/UL (ref 4–5.2)
SODIUM SERPL-SCNC: 137 MMOL/L (ref 136–145)
UNIT ABO: NORMAL
UNIT NUMBER: NORMAL
UNIT RH: NORMAL
UNIT VOLUME: 350
WBC # BLD AUTO: 7.5 X10*3/UL (ref 4.4–11.3)
XM INTEP: NORMAL

## 2025-03-12 PROCEDURE — 85018 HEMOGLOBIN: CPT | Performed by: INTERNAL MEDICINE

## 2025-03-12 PROCEDURE — 2500000001 HC RX 250 WO HCPCS SELF ADMINISTERED DRUGS (ALT 637 FOR MEDICARE OP): Performed by: NURSE PRACTITIONER

## 2025-03-12 PROCEDURE — 2500000004 HC RX 250 GENERAL PHARMACY W/ HCPCS (ALT 636 FOR OP/ED): Performed by: NURSE PRACTITIONER

## 2025-03-12 PROCEDURE — 80069 RENAL FUNCTION PANEL: CPT | Performed by: INTERNAL MEDICINE

## 2025-03-12 PROCEDURE — P9016 RBC LEUKOCYTES REDUCED: HCPCS

## 2025-03-12 PROCEDURE — 36430 TRANSFUSION BLD/BLD COMPNT: CPT

## 2025-03-12 PROCEDURE — 99232 SBSQ HOSP IP/OBS MODERATE 35: CPT | Performed by: INTERNAL MEDICINE

## 2025-03-12 PROCEDURE — 36415 COLL VENOUS BLD VENIPUNCTURE: CPT | Performed by: INTERNAL MEDICINE

## 2025-03-12 PROCEDURE — 83735 ASSAY OF MAGNESIUM: CPT | Performed by: INTERNAL MEDICINE

## 2025-03-12 PROCEDURE — 85025 COMPLETE CBC W/AUTO DIFF WBC: CPT | Performed by: INTERNAL MEDICINE

## 2025-03-12 PROCEDURE — 2500000002 HC RX 250 W HCPCS SELF ADMINISTERED DRUGS (ALT 637 FOR MEDICARE OP, ALT 636 FOR OP/ED): Performed by: NURSE PRACTITIONER

## 2025-03-12 PROCEDURE — 97165 OT EVAL LOW COMPLEX 30 MIN: CPT | Mod: GO

## 2025-03-12 PROCEDURE — 2500000001 HC RX 250 WO HCPCS SELF ADMINISTERED DRUGS (ALT 637 FOR MEDICARE OP): Performed by: INTERNAL MEDICINE

## 2025-03-12 RX ORDER — ACETAMINOPHEN 325 MG/1
975 TABLET ORAL EVERY 8 HOURS
Status: DISCONTINUED | OUTPATIENT
Start: 2025-03-12 | End: 2025-03-12 | Stop reason: HOSPADM

## 2025-03-12 RX ORDER — CLOPIDOGREL BISULFATE 75 MG/1
75 TABLET ORAL DAILY
Status: DISCONTINUED | OUTPATIENT
Start: 2025-03-12 | End: 2025-03-12

## 2025-03-12 RX ORDER — CLOPIDOGREL BISULFATE 75 MG/1
75 TABLET ORAL DAILY
Status: DISCONTINUED | OUTPATIENT
Start: 2025-03-12 | End: 2025-03-12 | Stop reason: HOSPADM

## 2025-03-12 RX ADMIN — AMIODARONE HYDROCHLORIDE 200 MG: 200 TABLET ORAL at 08:07

## 2025-03-12 RX ADMIN — ACETAMINOPHEN 975 MG: 325 TABLET, FILM COATED ORAL at 11:15

## 2025-03-12 RX ADMIN — PANTOPRAZOLE SODIUM 40 MG: 40 INJECTION, POWDER, FOR SOLUTION INTRAVENOUS at 08:38

## 2025-03-12 RX ADMIN — METHIMAZOLE 5 MG: 5 TABLET ORAL at 08:07

## 2025-03-12 RX ADMIN — ATORVASTATIN CALCIUM 40 MG: 40 TABLET, FILM COATED ORAL at 08:07

## 2025-03-12 RX ADMIN — FERROUS SULFATE TAB 325 MG (65 MG ELEMENTAL FE) 325 MG: 325 (65 FE) TAB at 08:07

## 2025-03-12 ASSESSMENT — COGNITIVE AND FUNCTIONAL STATUS - GENERAL
MOBILITY SCORE: 24
DAILY ACTIVITIY SCORE: 24
DAILY ACTIVITIY SCORE: 22
HELP NEEDED FOR BATHING: A LITTLE
TOILETING: A LITTLE

## 2025-03-12 ASSESSMENT — PAIN - FUNCTIONAL ASSESSMENT
PAIN_FUNCTIONAL_ASSESSMENT: 0-10
PAIN_FUNCTIONAL_ASSESSMENT: 0-10

## 2025-03-12 ASSESSMENT — ACTIVITIES OF DAILY LIVING (ADL)
BATHING_ASSISTANCE: STAND BY
LACK_OF_TRANSPORTATION: NO
ADL_ASSISTANCE: INDEPENDENT

## 2025-03-12 ASSESSMENT — PAIN SCALES - GENERAL
PAINLEVEL_OUTOF10: 0 - NO PAIN
PAINLEVEL_OUTOF10: 0 - NO PAIN

## 2025-03-12 NOTE — DISCHARGE SUMMARY
[facility] Hospitalist Discharge Summary and Transition Note           Dianne Clay                  :  1940                     MRN:  [id number]     ADMIT DATE:  3/10/2025            DISCHARGE DATE:  3/12/2025    LOS: 2  day(s) since admission    PRIMARY CARE PHYSICIAN:  Jyoti Carbajal DO     VISIT STATUS: Admission     CODE STATUS:  Full Code     DISCHARGE DIAGNOSES:    Assessment & Plan  Gastrointestinal hemorrhage       HOSPITAL COURSE:  Dianne Clay is a 85 y.o. female with a past medical history of GI bleeds, anemia, arthritis, atrial fibrillation, diverticular hemorrhage, GERD, MI, hypothyroidism, and hypertension who presented to the emergency department with a GI bleed.  Patient states she woke up this morning and felt as if she had to use the bathroom.  States that she almost did not make it.  When she went to the bathroom she noticed dark black bloody stools.  Patient had a recent heart cath. Dr. Queen is her cardiologist.  She was started back on Eliquis a few days ago.  She denies any nausea, vomiting, or abdominal discomfort.  No fevers, chills, or chest pain.  She does report some shortness of breath with exertion.  Patient had an episode of feeling lightheaded as if she was going to pass out when getting up to the bathroom upon admission.  Noted large liquid maroon stool. Assisted back into bed. Bedrest for now.     In the ED: Glucose was 108.  Sodium 139.  Potassium 3.3.  BUN/creatinine 13/0.73.  Magnesium 1.75.  Troponins were negative x 3.  BNP was mildly elevated at 162.  Lactic acid was 1.0.  WBC was 7.2.  Initial H&H was 10.3/32.6, repeat at 1500 was 8.7/29.2.  Platelets are 153.  Occult stool positive.  Patient was transferred to [facility] for GI consult and admission by hospitalist for further evaluation and treatment.     #Recurrent GIB due to OAC  #ABLA  #Chronic Afib  #CAD s/p RICARDO  #HTN  #Hypothyroidism  -GIB order set, trend H/H  -PPI  -s/p 1UpRBC on  3/12/25  -discontinue home apixaban indefinitely pending oupt F/U with cardiology  -currently no plans for intervention by GI  -needs to F/U with primary cardiology for watchman device eval.   -Pt's primary cards (Dr Queen updated of plan via Epic secure chat 3/12/25)     PROCEDURES:  none  CONSULTANTS:  GI     COMPLEXITY OF FOLLOW UP:  [] Moderate Complexity: follow up within 7-14 calendar days (54878)  [x]Severe Complexity: follow up within 7 calendar days (41312)     FOLLOW UP TESTING, PENDING RESULTS ORREFERRALS AT TRANSITIONAL CARE VISIT:   [x]Yes  F/U with cardiology and GI   [] No     DISCHARGE MEDICATIONS:        Significant Medication Changes:         Your medication list        CONTINUE taking these medications        Instructions Last Dose Given Next Dose Due   amiodarone 200 mg tablet  Commonly known as: Pacerone      Take 1 tablet by mouth once daily with a meal.       atorvastatin 40 mg tablet  Commonly known as: Lipitor      Take 1 tablet (40 mg) by mouth once daily.       FeroSuL tablet  Generic drug: ferrous sulfate      TAKE 1 TABLET BY MOUTH DAILY WITH FOOD       methIMAzole 5 mg tablet  Commonly known as: Tapazole      TAKE 1 TABLET BY MOUTH ONCE DAILY.       pantoprazole 40 mg EC tablet  Commonly known as: ProtoNix      TAKE 1 TABLET BY MOUTH ONCE DAILY              STOP taking these medications      apixaban 2.5 mg tablet  Commonly known as: Eliquis                   DIET: regular      DISPOSITION:  Home     Follow up with Jyoti Carbajal DO  .        DISCHARGE TIME: > 30 minutes     Electronically signed by Sunshine Yañez DO on 03/12/25 at 5:07 PM      Dictated using Dragon Naturally Speaking Medical Version 2.4  Proof read however unrecognized voice recognition errors may have occurred

## 2025-03-12 NOTE — CARE PLAN
The patient's goals for the shift include  rest     The clinical goals for the shift include pt will remain HDS throughout shift

## 2025-03-12 NOTE — PROGRESS NOTES
Physical Therapy                   PT SCREEN    Patient Name: Dianne Clay  MRN: [id number]  Department: [address]  Room: [facility]  Today's Date: 3/12/2025     Discipline: Physical Therapy    PT Missed Visit: Yes     Missed Visit Reason: Cancel. PT consult received and chart reviewed. Pt is an 84 yo F presenting to [facility] on 3/10/25 with GI bleed. Discussed with evaluating OT, who reports pt is ambulating independently within room; no mobility concerns at this time. No acute PT needs identified; will discontinue order.     Pt to continue mobilizing with nursing staff until medically cleared for discharge.      Missed Time: Cancel    Comment: [id number]

## 2025-03-12 NOTE — PROGRESS NOTES
Dianne Clay is a 85 y.o. female on day 2 of admission presenting with Gastrointestinal hemorrhage.    Subjective   Patient sitting up in bed in no acute distress.       Objective     Physical Exam  Vitals reviewed.   Constitutional:       Appearance: Normal appearance.   Eyes:      Extraocular Movements: Extraocular movements intact.   Cardiovascular:      Heart sounds: Normal heart sounds.   Pulmonary:      Effort: Pulmonary effort is normal.      Breath sounds: Normal breath sounds.   Abdominal:      General: Bowel sounds are normal. There is no distension.      Palpations: Abdomen is soft.      Tenderness: There is no abdominal tenderness.   Genitourinary:     Rectum: Normal.   Skin:     General: Skin is warm.   Neurological:      Mental Status: She is alert and oriented to person, place, and time.   Psychiatric:         Behavior: Behavior normal.         Last Recorded Vitals  Blood pressure 122/59, pulse 91, temperature 36.6 °C (97.9 °F), temperature source Temporal, resp. rate 20, SpO2 98%.  Intake/Output last 3 Shifts:  I/O last 3 completed shifts:  In: 1010 [P.O.:1010]  Out: -     Relevant Results      ECG 12 lead    Result Date: 3/10/2025  Normal sinus rhythm Right axis deviation Pulmonary disease pattern Nonspecific ST abnormality Abnormal ECG When compared with ECG of 10-MAR-2025 08:43, (unconfirmed) Premature ventricular complexes are no longer Present See ED provider note for full interpretation and clinical correlation Confirmed by Marleni Finley ([id number]) on 3/10/2025 10:05:47 PM    Cardiac Catheterization Procedure    Result Date: 3/4/2025   [facility], Cath Lab, [address] [place] [address] Cardiovascular Catheterization Report Patient Name:      DIANNE CLAY        Performing Physician: [id number] Flavio Queen MD Study Date:        3/4/2025             Verifying Physician:  Michelle Muniz                                                                Siddhartha Queen MD MRN/PID:           [id number]             Primary Physician:    Nadia Carbajal DO Accession#:        [id number]         Ordering Provider:    [id number] FLAVIO QUEEN Date of Birth/Age: 1940 / 85 years Cardiologist:         [id number] Flavio Queen MD Gender:            F                    Fellow:               [id number] Jorge Sandoval MD Encounter#:        [id number]  Study: Right and Left Heart Cath  Indications: JUANIS CASTRO is a 85 year old female who presents with coronary artery disease, prior myocardial infarction, prior percutaneous coronary intervention, atrial fibrillation, dyslipidemia, hypertension, peripheral artery disease and an anginal equivalent chest pain assessment (i.e. dyspnea on exertion believed to be from ischemia). Stable angina. Pulmonary hypertension. Medical History: Stress test performed: No. CTA performed: No. Jeevan accessed: No. LVEF Assessed: Yes. LVEF = 55%. Cardiac arrest: No. Cardiac surgical consult: No. Cardiovascular instability: No. Frailty status of patient entering lab: 6 = Moderately frail.  Procedure Description: After infiltration with 2% Lidocaine, the right radial artery was cannulated with a modified Seldinger technique. Subsequently a 6 Bhutanese sheath was placed retrograde in the right radial artery. Selective coronary catheterization was performed using a 5 Fr catheter(s) exchanged over a guide wire to cannulate the coronary arteries. A 5 Fr Tiger catheter was used for left and right coronary artery injections. Cardiac output was calculated via the Lazaro method. After completion of the procedure, the arterial sheath was pulled and a TR Band Radial  Compression Device was utilized to obtain patent hemostasis. Post-procedure, the venous sheath was pulled and pressure was applied to the site.  Coronary Angiography: The coronary circulation is right dominant.  Left Main Coronary Artery: The left main coronary artery is a normal caliber vessel. The left main arises normally from the left coronary sinus of Valsalva and bifurcates into the LAD and circumflex coronary arteries. The left main coronary artery showed no significant disease or stenosis greater than 30%.  Left Anterior Descending Coronary Artery Distribution: The left anterior descending coronary artery is a normal caliber vessel. The LAD arises normally from the left main coronary artery. The LAD demonstrated no significant disease or stenosis greater than 30%. The proximal 1st diagonal branch revealed 50% stenosis. The 2nd diagonal branch demonstrated no significant disease or stenosis greater than 30%.  Circumflex Coronary Artery Distribution: The circumflex coronary artery is a normal caliber vessel. The circumflex arises normally from the left main coronary artery and terminates in the AV groove. The circumflex revealed no significant disease or stenosis greater than 30%. The 1st obtuse marginal branch showed no significant disease or stenosis greater than 30%. The 2nd obtuse marginal branch demonstrated no significant disease or stenosis greater than 30%.  Right Heart Catheterization: Cardiac output was calculated via the Lazaro method. Cardiac output is normal. Pulmonary arterial hypertension. Right heart cath hemodynamics: RA 5, RV 46/1 (RVEDP 7), PA 50/16 (mean 30), PCWP 13, PA sat 64% CO/CI 4.49/2.94.  Right Coronary Artery Distribution: The right coronary artery is a normal caliber vessel. The RCA arises normally from the right sinus of Valsalva. The proximal to mid right coronary artery showed 80% in-stent restenosis and Excellent left to right collaterals.  Left Ventriculography: Left  ventriculography showing normal systolic function with preserved EF. Does show segmental wall motion abnormality with hypokinesis of the inferior/diaphragmatic wall.  Coronary Lesion Summary: Vessel                            Stenosis                       Vessel Segment 1st Diagonal                    50% stenosis                        proximal RCA          80% in-stent restenosis and Excellent left to right proximal to mid                                 collaterals.  Complications: No in-lab complications observed.  Cardiac Cath Post Procedure Notes: Post Procedure Diagnosis: Severe ISR of prox-mid RCA stent with L-R collaters;                           otherwise, non-obstructive CAD.                           Pre-capillary pulmonary hypertension. Blood Loss:               Estimated blood loss during the procedure was 5 cc                           mls. Specimens Removed:        Number of specimen(s) removed: none. ____________________________________________________________________________________ CONCLUSIONS:  1. Severe ISR of prox-mid RCA stent with L-R collaterals; Non-obstructive atherosclerosis of the left system.  2. Mild to moderate Pre-capillary pulmoanry hypertension 3. Outpatient cardiology follow up.  4. Regular follow up with Dr Carbajal. ICD 10 Codes: Shortness of breath-R06.02; Dyspnea, unspecified-R06.00  CPT Codes: Right Heart Cath O2/Cardiac output without biopsy (RHC)-83746; Left Heart Cath (visualization of coronaries) and LV-63737  [id number] Flavio Queen MD Performing Physician Electronically signed by [id number] Flavio Queen MD on 3/4/2025 at 7:12:59 PM  ** Final **     * Cannot find OR log *  Last relevant procedure: Colonoscopy on 9/6/24            Results for orders placed or performed during the hospital encounter of 03/10/25 (from the past 24 hours)   Prepare RBC: 1 Units   Result Value Ref Range    PRODUCT CODE U7606E02     Unit Number [id number]     Unit ABO A     Unit  RH POS     XM INTEP COMP     Dispense Status IS     Blood Expiration Date 3/19/2025 11:59:00 PM EDT     PRODUCT BLOOD TYPE 6200     UNIT VOLUME 350    Renal Function Panel   Result Value Ref Range    Glucose 96 74 - 99 mg/dL    Sodium 137 136 - 145 mmol/L    Potassium 3.7 3.5 - 5.3 mmol/L    Chloride 106 98 - 107 mmol/L    Bicarbonate 26 21 - 32 mmol/L    Anion Gap 9 (L) 10 - 20 mmol/L    Urea Nitrogen 11 6 - 23 mg/dL    Creatinine 0.76 0.50 - 1.05 mg/dL    eGFR 77 >60 mL/min/1.73m*2    Calcium 7.8 (L) 8.6 - 10.3 mg/dL    Phosphorus 3.0 2.5 - 4.9 mg/dL    Albumin 2.7 (L) 3.4 - 5.0 g/dL   Magnesium   Result Value Ref Range    Magnesium 1.59 (L) 1.60 - 2.40 mg/dL   CBC and Auto Differential   Result Value Ref Range    WBC 7.5 4.4 - 11.3 x10*3/uL    nRBC 0.0 0.0 - 0.0 /100 WBCs    RBC 2.53 (L) 4.00 - 5.20 x10*6/uL    Hemoglobin 6.9 (L) 12.0 - 16.0 g/dL    Hematocrit 21.9 (L) 36.0 - 46.0 %    MCV 87 80 - 100 fL    MCH 27.3 26.0 - 34.0 pg    MCHC 31.5 (L) 32.0 - 36.0 g/dL    RDW 17.0 (H) 11.5 - 14.5 %    Platelets 177 150 - 450 x10*3/uL    Neutrophils % 72.8 40.0 - 80.0 %    Immature Granulocytes %, Automated 0.4 0.0 - 0.9 %    Lymphocytes % 16.6 13.0 - 44.0 %    Monocytes % 7.6 2.0 - 10.0 %    Eosinophils % 1.9 0.0 - 6.0 %    Basophils % 0.7 0.0 - 2.0 %    Neutrophils Absolute 5.43 1.60 - 5.50 x10*3/uL    Immature Granulocytes Absolute, Automated 0.03 0.00 - 0.50 x10*3/uL    Lymphocytes Absolute 1.24 0.80 - 3.00 x10*3/uL    Monocytes Absolute 0.57 0.05 - 0.80 x10*3/uL    Eosinophils Absolute 0.14 0.00 - 0.40 x10*3/uL    Basophils Absolute 0.05 0.00 - 0.10 x10*3/uL     Scheduled medications  acetaminophen, 975 mg, oral, q8h  amiodarone, 200 mg, oral, Daily  atorvastatin, 40 mg, oral, Daily  [Held by provider] clopidogrel, 75 mg, oral, Daily  ferrous sulfate, 1 tablet, oral, Daily  methIMAzole, 5 mg, oral, Daily  pantoprazole, 40 mg, intravenous, BID      Continuous medications     PRN medications  PRN medications:  ondansetron **OR** ondansetron         Assessment/Plan   Assessment & Plan  Gastrointestinal hemorrhage    85 y.o. female with PMH of CAD s/p MI, remote PCI 2003, paroxysmal A-fib recently started on 2.5 Eliquis 2 days ago, HTN, OA, hypothyroidism, GERD and PAD presenting to [facility] as a transfer from [place] for active LGIB. Hgb 10.2. down from 11.1 3/4.  Past GI notes indicate patient is poor candidate for anticoagulation.    Etiology likely self limited diverticular bleed. Differentials include brisk UGIB or AVM bleed.     Today labs note Hgb 6.9, down from 7.6 yesterday. Patient receiving 1 unit PRBC.    Patient denies any further hematochezia or melena. Denies abdominal pain or discomfort. Denies epigastric or abdominal pain.    Speaking with team including Dr Queen in cardiology, patient to  be off antiplatelet and anticoagulant at this time. No need for aspirin at this time.      PLAN   -daily PPI   -Daily CBC, replace hgb <7  -Consider colonoscopy with continued BRBPR and drop in Hb. May also need EGD due to abnormal findings on imaging  -follow up with Dr Queen-cardiology on Monday   -follow up outpatient GI- Dr Cook     Gi will sign off at this time    Please reach out through [employer] chat with any further questions.   Plan disccused with Dr Joyce Sahni, APRN-CNP  I saw and evaluated the patient. I personally obtained the key and critical portions of the history and physical exam or was physically present for key and critical portions performed by the NP. I reviewed the NP's documentation and discussed the patient with the NP. I agree with the NP's medical decision making as documented in the note.

## 2025-03-12 NOTE — CARE PLAN
The patient's goals for the shift include  pt will remain HDS throughout shift.    The clinical goals for the shift include pt will remain HDS throughout shift      Problem: Pain - Adult  Goal: Verbalizes/displays adequate comfort level or baseline comfort level  Outcome: Progressing     Problem: Safety - Adult  Goal: Free from fall injury  Outcome: Progressing     Problem: Discharge Planning  Goal: Discharge to home or other facility with appropriate resources  Outcome: Progressing     Problem: Chronic Conditions and Co-morbidities  Goal: Patient's chronic conditions and co-morbidity symptoms are monitored and maintained or improved  Outcome: Progressing     Problem: Nutrition  Goal: Nutrient intake appropriate for maintaining nutritional needs  Outcome: Progressing

## 2025-03-12 NOTE — PROGRESS NOTES
Occupational Therapy    Evaluation    Patient Name: Dianne Clay  MRN: [id number]  Department: [address]  Room: [address] [place] [address]  Today's Date: 3/12/2025  Time Calculation  Start Time: 1029  Stop Time: 1044  Time Calculation (min): 15 min    Assessment  IP OT Assessment  OT Assessment: Pt presents at baseline with ADL performance and functional mobility. No further skilled OT needs identified.  Prognosis: Good  Barriers to Discharge Home: No anticipated barriers  Evaluation/Treatment Tolerance: Patient tolerated treatment well  Medical Staff Made Aware: Yes  End of Session Communication: Bedside nurse  End of Session Patient Position: Bed, 3 rail up, Alarm off, caregiver present (receiving blood with RN)  Plan:  No Skilled OT: No acute OT goals identified  OT Frequency: OT eval only  OT Discharge Recommendations: No further acute OT  OT Recommended Transfer Status: Stand by assist  OT - OK to Discharge: Yes (per OT POC)    Subjective   Current Problem:  1. Gastrointestinal hemorrhage          General:  General  Reason for Referral: 84 yo female referred to OT for impaired ADLs/mobility  Referred By: MATT Yañez DO  Past Medical History Relevant to Rehab: GI bleeds, anemia, arthritis, atrial fibrillation, diverticular hemorrhage, GERD, MI, hypothyroidism, and hypertension  Prior to Session Communication: Bedside nurse  Patient Position Received: Up in bathroom, Alarm off, caregiver present  General Comment: Pt cleared to be seen by therapy despite hemoglobin of 6.9. Pt pleasant, cooperative with OT evaluation.  Precautions:  Medical Precautions: Fall precautions     Date/Time Vitals Session Patient Position Pulse Resp SpO2 BP MAP (mmHg)    03/12/25 1048 --  --  93  20  99 %  129/68  --     03/12/25 1054 --  --  --  20  --  124/66  --                Pain:  Pain Assessment  Pain Assessment: 0-10  0-10 (Numeric) Pain Score: 0 - No pain    Objective   Cognition:  Overall Cognitive Status: Within Functional Limits  Arousal/Alertness:  Appropriate responses to stimuli  Orientation Level: Oriented X4           Home Living:  Type of Home: House  Lives With: Alone  Home Adaptive Equipment: None  Home Layout: Two level, Stairs to alternate level with rails  Alternate Level Stairs-Rails: Right  Alternate Level Stairs-Number of Steps: standard flight  Home Access: Ramped entrance  Bathroom Shower/Tub: Walk-in shower  Bathroom Equipment: Shower chair without back   Prior Function:  Level of [place]: Independent with ADLs and functional transfers, Independent with homemaking with ambulation  Receives Help From: Friends, Neighbor  ADL Assistance: Independent  Homemaking Assistance: Independent  Ambulatory Assistance: Independent  IADL History:  Mode of Transportation: Friends  ADL:  Eating Assistance: Independent  Grooming Assistance: Independent  Bathing Assistance: Stand by  UE Dressing Assistance: Independent  LE Dressing Assistance: Independent  Toileting Assistance with Device: Stand by  Functional Assistance: Stand by  ADL Comments: Pt completing bathing tasks with distant supervision upon arrival to evaluation. ADL performance anticipated.  Activity Tolerance:  Endurance: Tolerates 10 - 20 min exercise with multiple rests  Bed Mobility/Transfers: Bed Mobility  Bed Mobility: Yes  Bed Mobility 1  Bed Mobility 1: Supine to sitting  Level of Assistance 1: Modified independent    Transfers  Transfer: Yes  Transfer 1  Transfer From 1: Sit to  Transfer to 1: Stand  Technique 1: Sit to stand, Stand to sit  Transfer Level of Assistance 1: Close supervision  Trials/Comments 1: no physical assistance required      Functional Mobility:  Functional Mobility  Functional Mobility Performed: Yes  Functional Mobility 1  Surface 1: Level tile  Device 1: No device  Assistance 1: Close supervision  Comments 1: Ambulated functional household distance ~80' with good balance, good safety awareness.  Sitting Balance:  Static Sitting Balance  Static Sitting-Balance  Support: Feet supported  Static Sitting-Level of Assistance: Independent  Standing Balance:  Static Standing Balance  Static Standing-Balance Support: No upper extremity supported  Static Standing-Level of Assistance: Distant supervision  Modalities:     IADL's:   Mode of Transportation: Friends    Strength:  Strength Comments: BUE WFL    Coordination:  Movements are Fluid and Coordinated: Yes   Hand Function:  Hand Function  Gross Grasp: Functional  Coordination: Functional  Extremities: RUE   RUE : Within Functional Limits and LUE   LUE: Within Functional Limits    Outcome Measures: [facility] Daily Activity  Putting on and taking off regular lower body clothing: None  Bathing (including washing, rinsing, drying): A little  Putting on and taking off regular upper body clothing: None  Toileting, which includes using toilet, bedpan or urinal: A little  Taking care of personal grooming such as brushing teeth: None  Eating Meals: None  Daily Activity - Total Score: 22      Education Documentation  Body Mechanics, taught by Low Delarosa OT at 3/12/2025 10:59 AM.  Learner: Patient  Readiness: Acceptance  Method: Explanation  Response: Verbalizes Understanding    Precautions, taught by Low Delarosa OT at 3/12/2025 10:59 AM.  Learner: Patient  Readiness: Acceptance  Method: Explanation  Response: Verbalizes Understanding    ADL Training, taught by Low Delarosa OT at 3/12/2025 10:59 AM.  Learner: Patient  Readiness: Acceptance  Method: Explanation  Response: Verbalizes Understanding    Education Comments  No comments found.

## 2025-03-12 NOTE — PROGRESS NOTES
[facility] Hospitalist Progress Note       [id number]: Please page me for patient care issues.  [id number]: Please page night hospitalist for any issues.     Dianne Clay  :  1940(85 y.o.)  MRN:  [id number]  PCP: Jyoti Carbajal DO  LOS: 2  day(s) since admission    Assessment & Plan  Gastrointestinal hemorrhage      Assessment and Plan:     Dianne Clay is a 85 y.o. female with a past medical history of GI bleeds, anemia, arthritis, atrial fibrillation, diverticular hemorrhage, GERD, MI, hypothyroidism, and hypertension who presented to the emergency department with a GI bleed.  Patient states she woke up this morning and felt as if she had to use the bathroom.  States that she almost did not make it.  When she went to the bathroom she noticed dark black bloody stools.  Patient had a recent heart cath. Dr. Queen is her cardiologist.  She was started back on Eliquis a few days ago.  She denies any nausea, vomiting, or abdominal discomfort.  No fevers, chills, or chest pain.  She does report some shortness of breath with exertion.  Patient had an episode of feeling lightheaded as if she was going to pass out when getting up to the bathroom upon admission.  Noted large liquid maroon stool. Assisted back into bed. Bedrest for now.     In the ED: Glucose was 108.  Sodium 139.  Potassium 3.3.  BUN/creatinine 13/0.73.  Magnesium 1.75.  Troponins were negative x 3.  BNP was mildly elevated at 162.  Lactic acid was 1.0.  WBC was 7.2.  Initial H&H was 10.3/32.6, repeat at 1500 was 8.7/29.2.  Platelets are 153.  Occult stool positive.  Patient was transferred to [facility] for GI consult and admission by hospitalist for further evaluation and treatment.    #Recurrent GIB due to OAC  #ABLA  #Chronic Afib  #CAD s/p RICARDO  #HTN  #Hypothyroidism  -GIB order set, trend H/H  -PPI  -1UpRBC on 3/12/25  -discontinue home apixaban indefinitely pending oupt F/U with cardiology  -currently no plans for intervention by  GI  -needs to F/U with primary cardiology for watchman device eval.   -Pt's primary cards (Dr Queen updated of plan via Epic secure chat 3/12/25)  -CS notes reviewed and recs appreciated:GI     Disposition: await test results, await consultant recommendations, and await clinical improvement    DVT Prophylaxis: SCDs    Code status: Full Code  Diet: Adult diet Regular    Level of MDM:  High    discussed with nurse, discussed with TCC/SW, I personally examined the patient, and I personally reviewed chart, data, labs radiology reports    Family Communication  Number :   Name of Designated Family Representative:       Total time spent: 35 minutes, of total time providing counseling or in coordination of care. Total time on this day of visit includes record and documentation review before and after visit including documentation and time not explicitly included on EMR time stamp      Subjective:   Interval History:  HPI  The patient complains of Anemia  The patient feels their symptoms areimproving  no events or new concerns    Scheduled Meds:amiodarone, 200 mg, oral, Daily  atorvastatin, 40 mg, oral, Daily  ferrous sulfate, 1 tablet, oral, Daily  methIMAzole, 5 mg, oral, Daily  pantoprazole, 40 mg, intravenous, BID      Continuous Infusions:   PRN Meds:PRN medications: acetaminophen **OR** acetaminophen **OR** acetaminophen, acetaminophen **OR** acetaminophen **OR** acetaminophen, ondansetron **OR** ondansetron    Review of Systems   All other systems reviewed and are negative.    Interval Pertinent History:  Social History     Tobacco Use    Smoking status: Never     Passive exposure: Never    Smokeless tobacco: Never   Substance Use Topics    Alcohol use: Yes     Comment: RARE beer         Objective:   Patient Vitals for the past 24 hrs:   BP Temp Temp src Pulse Resp SpO2   03/12/25 0811 131/61 37 °C (98.6 °F) Temporal 83 16 97 %   03/12/25 0519 126/59 36.2 °C (97.2 °F) Temporal 80 16 99 %   03/12/25 0011 129/70 37 °C  (98.6 °F) Temporal 95 16 97 %   25 114/58 36.7 °C (98.1 °F) Temporal 88 16 98 %       Average, Min, and Max for last 24 hours Vitals:  TEMPERATURE:  Temp  Av.7 °C (98.1 °F)  Min: 36.2 °C (97.2 °F)  Max: 37 °C (98.6 °F)    RESPIRATIONS RANGE: Resp  Av  Min: 16  Max: 16    PULSE RANGE: Pulse  Av.5  Min: 80  Max: 95    BLOOD PRESSURE RANGE:  Systolic (24hrs), Av , Min:114 , Max:131   ; Diastolic (24hrs), Av, Min:58, Max:70      PULSE OXIMETRY RANGE: SpO2  Av.8 %  Min: 97 %  Max: 99 %  There is no height or weight on file to calculate BMI.    I/O last 3 completed shifts:  In: 1010 [P.O.:1010]  Out: -   Weight change:      Physical Exam  Vitals and nursing note reviewed.   Constitutional:       Appearance: Normal appearance.   HENT:      Head: Normocephalic and atraumatic.      Right Ear: External ear normal.      Left Ear: External ear normal.      Nose: Nose normal.      Mouth/Throat:      Mouth: Mucous membranes are moist.   Eyes:      General: No scleral icterus.        Right eye: No discharge.         Left eye: No discharge.      Extraocular Movements: Extraocular movements intact.      Conjunctiva/sclera: Conjunctivae normal.      Pupils: Pupils are equal, round, and reactive to light.   Cardiovascular:      Rate and Rhythm: Normal rate and regular rhythm.   Pulmonary:      Effort: Pulmonary effort is normal.      Breath sounds: Normal breath sounds.   Abdominal:      General: Abdomen is flat. Bowel sounds are normal.      Palpations: Abdomen is soft.   Musculoskeletal:         General: Normal range of motion.      Right lower leg: No edema.      Left lower leg: No edema.   Skin:     General: Skin is warm and dry.      Capillary Refill: Capillary refill takes less than 2 seconds.   Neurological:      General: No focal deficit present.   Psychiatric:         Mood and Affect: Mood normal.         Thought Content: Thought content normal.         Judgment: Judgment normal.          Lab Results   Component Value Date     03/11/2025    K 3.5 03/11/2025     03/11/2025    CO2 25 03/11/2025    BUN 16 03/11/2025    CREATININE 0.82 03/11/2025    GLUCOSE 94 03/11/2025    CALCIUM 7.9 (L) 03/11/2025    PROT 8.7 (H) 03/10/2025    BILITOT 0.6 03/10/2025    ALKPHOS 80 03/10/2025    AST 18 03/10/2025    ALT 10 03/10/2025       Lab Results   Component Value Date    WBC 6.4 03/11/2025    HGB 7.6 (L) 03/11/2025    HGB 7.6 (L) 03/11/2025    HCT 23.8 (L) 03/11/2025    HCT 23.8 (L) 03/11/2025    MCV 86 03/11/2025     03/11/2025    LYMPHOPCT 12.7 03/10/2025    RBC 2.78 (L) 03/11/2025    MCH 27.3 03/11/2025    MCHC 31.9 (L) 03/11/2025    RDW 16.8 (H) 03/11/2025       Lab Results   Component Value Date    TSH 0.51 01/14/2025     Lab Results   Component Value Date    LACTATE 1.0 03/10/2025    TROPONINI 0.12 (HH) 11/29/2021     (H) 03/10/2025    DDIMER 272 09/30/2019    INR 1.3 (H) 03/10/2025       IMAGES:  Encounter Date: 03/10/25   ECG 12 lead   Result Value    Ventricular Rate 87    Atrial Rate 87    AL Interval 176    QRS Duration 82    QT Interval 414    QTC Calculation(Bazett) 498    P Axis 46    R Axis 151    T Axis 51    QRS Count 14    Q Onset 228    P Onset 140    P Offset 166    T Offset 435    QTC Fredericia 468    Narrative    Normal sinus rhythm  Right axis deviation  Pulmonary disease pattern  Nonspecific ST abnormality  Abnormal ECG  When compared with ECG of 10-MAR-2025 08:43, (unconfirmed)  Premature ventricular complexes are no longer Present  See ED provider note for full interpretation and clinical correlation  Confirmed by Marleni Finley ([id number]) on 3/10/2025 10:05:47 PM        Transthoracic Echo Complete    Result Date: 1/14/2025   [facility], [address] [address]              Tel [phone] and Fax [phone] TRANSTHORACIC ECHOCARDIOGRAM REPORT  Patient Name:       JUANIS Lewis Physician:    [id number] Flavio                                                                Siddhartha Roy MD Study Date:         1/14/2025           Ordering Provider:    [id number] MARIOLA                                                               SIDDHARTHA ROY MRN/PID:            [id number]            Fellow: Accession#:         [id number]        Nurse:                Lilliana Pérez RN Date of Birth/Age:  1940 / 84      Sonographer:          Jessica arizmendi                                     RDAJITH Gender assigned at  F                   Additional Staff: Birth: Height:             160.02 cm           Admit Date: Weight:             52.16 kg            Admission Status:     Outpatient BSA / BMI:          1.53 m2 / 20.37     Encounter#:           [id number]                     kg/m2 Blood Pressure:     162/83 mmHg         Department Location:  [facility] Study Type:    TRANSTHORACIC ECHO (TTE) COMPLETE Diagnosis/ICD: Permanent AFib-I48.21 Indication:    Atrial Fibrillation CPT Code:      Echo Complete w Full [id number] Patient History: Pertinent History: A-Fib and HTN. PAD, GERD. Study Detail: The following Echo studies were performed: 2D, M-Mode, Doppler and               Strain. Agitated saline used as a contrast agent for intraseptal               flow evaluation. The patient was awake.  PHYSICIAN INTERPRETATION: Left Ventricle: Left ventricular ejection fraction is normal, by visual estimate at 55-60%. There is mild concentric left ventricular hypertrophy. There are no regional left ventricular wall motion abnormalities. The left ventricular cavity size is normal. There is left ventricular concentric remodeling. Spectral Doppler shows a Grade I (impaired relaxation pattern) of left ventricular diastolic filling with normal left atrial filling pressure. Left Atrium: The left atrium is mildly dilated. Right Ventricle: The right ventricle is normal in size. There is normal right ventricular global systolic function.  Right Atrium: The right atrium is normal in size. Aortic Valve: The aortic valve is probably trileaflet. There is mild aortic valve cusp calcification. There is mild to moderate aortic valve thickening. There is evidence of mildly elevated transaortic gradients consistent with sclerosis of the aortic valve. There is no evidence of aortic valve regurgitation. The peak instantaneous gradient of the aortic valve is 12 mmHg. Mitral Valve: The mitral valve is normal in structure. There is mild to moderate mitral annular calcification. There is mild to moderate mitral valve regurgitation. The mitral regurgitant orifice area is 21 mm2. The mitral regurgitant volume is 38.26 ml. Tricuspid Valve: The tricuspid valve is structurally normal. There is mild tricuspid regurgitation. Pulmonic Valve: The pulmonic valve is not well visualized. There is physiologic pulmonic valve regurgitation. Pericardium: There is no pericardial effusion noted. Aorta: The aortic root was not well visualized.  CONCLUSIONS:  1. Left ventricular ejection fraction is normal, by visual estimate at 55-60%.  2. Spectral Doppler shows a Grade I (impaired relaxation pattern) of left ventricular diastolic filling with normal left atrial filling pressure.  3. There is normal right ventricular global systolic function.  4. The left atrium is mildly dilated.  5. Mild to moderate mitral valve regurgitation.  6. Aortic valve sclerosis. QUANTITATIVE DATA SUMMARY:  2D MEASUREMENTS:          Normal Ranges: Ao Root d:       3.50 cm  (2.0-3.7cm) LAs:             2.80 cm  (2.7-4.0cm) IVSd:            1.01 cm  (0.6-1.1cm) LVPWd:           1.10 cm  (0.6-1.1cm) LVIDd:           3.73 cm  (3.9-5.9cm) LVIDs:           2.44 cm LV Mass Index:   81 g/m2 LVEDV Index:     71 ml/m2 LV % FS          34.6 %  LA VOLUME:                    Normal Ranges: LA Vol A4C:        50.3 ml    (22+/-6mL/m2) LA Vol A2C:        40.8 ml LA Vol BP:         45.3 ml LA Vol Index A4C:  32.9ml/m2 LA  Vol Index A2C:  26.7 ml/m2 LA Vol Index BP:   29.6 ml/m2 LA Area A4C:       19.3 cm2 LA Area A2C:       17.4 cm2 LA Major Axis A4C: 6.3 cm LA Major Axis A2C: 6.3 cm LA Volume Index:   28.0 ml/m2  RA VOLUME BY A/L METHOD:            Normal Ranges: RA Vol A4C:              35.5 ml    (8.3-19.5ml) RA Vol Index A4C:        23.2 ml/m2 RA Area A4C:             15.7 cm2 RA Major Axis A4C:       5.9 cm  M-MODE MEASUREMENTS:         Normal Ranges: Ao Root:             3.30 cm (2.0-3.7cm) LAs:                 4.10 cm (2.7-4.0cm)  AORTA MEASUREMENTS:         Normal Ranges: Asc Ao, d:          3.00 cm (2.1-3.4cm)  LV SYSTOLIC FUNCTION BY 2D PLANIMETRY (MOD):                      Normal Ranges: EF-A4C View:    62 % (>=55%) EF-A2C View:    64 % EF-Biplane:     64 % EF-Visual:      58 % LV EF Reported: 58 %  LV DIASTOLIC FUNCTION:             Normal Ranges: MV Peak E:             1.01 m/s    (0.7-1.2 m/s) MV Peak A:             1.21 m/s    (0.42-0.7 m/s) E/A Ratio:             0.83        (1.0-2.2) MV e'                  0.069 m/s   (>8.0) MV lateral e'          0.09 m/s MV medial e'           0.05 m/s MV A Dur:              143.00 msec E/e' Ratio:            14.62       (<8.0) PulmV Sys Masood:         62.50 cm/s PulmV Irving Masood:        49.30 cm/s PulmV S/D Masood:         1.30  MITRAL VALVE:          Normal Ranges: MV DT:        211 msec (150-240msec)  MITRAL INSUFFICIENCY:             Normal Ranges: PISA Radius:          0.6 cm MR VTI:               184.00 cm MR Vmax:              563.00 cm/s MR Alias Masood:         61.6 cm/s MR Volume:            38.26 ml MR Flow Rt:           117.08 ml/s MR EROA:              21 mm2  AORTIC VALVE:            Normal Ranges: AoV Vmax:      1.75 m/s  (<=1.7m/s) AoV Peak P.2 mmHg (<20mmHg) LVOT Max Masood:  1.13 m/s  (<=1.1m/s) LVOT VTI:      23.20 cm LVOT Diameter: 1.90 cm   (1.8-2.4cm) AoV Area,Vmax: 1.83 cm2  (2.5-4.5cm2)  RIGHT VENTRICLE: RV Basal 3.20 cm RV Mid   1.80 cm RV Major 7.1 cm TAPSE:    17.0 mm RV s'    0.11 m/s  TRICUSPID VALVE/RVSP:          Normal Ranges: Peak TR Velocity:     2.13 m/s RV Syst Pressure:     21 mmHg  (< 30mmHg) IVC Diam:             1.70 cm  PULMONIC VALVE:          Normal Ranges: PV Max Masood:     1.1 m/s  (0.6-0.9m/s) PV Max P.6 mmHg  Pulmonary Veins: PulmV Irving Masood: 49.30 cm/s PulmV S/D Masood:  1.30 PulmV Sys Masood:  62.50 cm/s  [id number] Flavio Queen MD Electronically signed on 2025 at 5:09:17 PM  ** Final **    === 20 ===    - Impression -  No acute osseous abnormality of the left foot.  === 24 ===    CT ABDOMEN PELVIS ANGIOGRAM W AND/OR WO IV CONTRAST    - Impression -  No aortic aneurysm or dissection.    Diffuse colonic wall thickening, which may be seen in the setting of  colitis. No definite CTA evidence for active intraluminal bleeding.    Gastric wall thickening. Correlate for symptoms of gastritis.    Calcified uterine fibroids.    Indeterminate 2 cm left adrenal nodule, which is similar in size  compared to prior recent imaging 2024. Further characterization  can be obtained with nonemergent MRI..    MACRO:  None.    Signed by: Evan Finkelstein 2024 4:13 AM  Dictation workstation:   [id number]  === 20 ===    - Impression -  No acute osseous abnormality of the left foot.  === 13 ===    MRI ORBIT FACE NECK W WO CONTRAST    - Impression -  There is mild age-appropriate diffuse brain parenchymal volume loss.    There are minimal nonspecific white matter changes within the  cerebral hemispheres bilaterally as well as a few small foci of  increased signal on the FLAIR and T2 weighted images noted within the  jami which while nonspecific, given the patient's age, likely  represent sequelae of more remote small vessel ischemic change..    No abnormal intracranial mass lesion or abnormal intracranial  enhancement is identified on the postcontrast images.    The high-resolution images through the orbits suggest mild  bilateral  exophthalmos. Correlation with physical examination is recommended.  The extraocular muscles are borderline in size within the orbits  bilaterally. No discrete abnormal intraorbital mass lesion is noted.  No abnormal signal is identified along the optic nerves. There is a  prosthetic lens in the left globe compatible with previous cataract  surgery.    There are mild inflammatory changes noted within the paranasal  sinuses.    The study was interpreted at [facility].    Additional results of the last 24 hours have been reviewed.    Dictated using [employer] Version 2.4  Proof read however unrecognized voice recognition errors may have occurred     Electronically signed by Sunshine Yañez DO on 03/12/25 at 9:36 AM

## 2025-03-12 NOTE — PROGRESS NOTES
03/12/25 0730   Discharge Planning   Living Arrangements Alone   Support Systems Family members   Assistance Needed A&OX4; independent with ADLs with no DME; doesn't drive (friends/neighbors drive); room air baseline and currently room air; PCP Dr Jyoti Carbajal; pt just started Eliquis recently   Type of Residence Private residence   Number of Stairs to Enter Residence 0   Number of Stairs Within Residence 26  (13 up / 13 down)   Do you have animals or pets at home? Yes   Type of Animals or Pets 1 Dog (neighbor is watching)   Who is requesting discharge planning? Provider   Home or Post Acute Services None   Expected Discharge Disposition Home  (Pt aware and fine with probable dc today-IMM obtained and patient denies any home going needs. Pt is able to arrange ride home once dc confirmed.)   Financial Resource Strain   How hard is it for you to pay for the very basics like food, housing, medical care, and heating? Not hard   Housing Stability   In the last 12 months, was there a time when you were not able to pay the mortgage or rent on time? N   In the past 12 months, how many times have you moved where you were living? 0   At any time in the past 12 months, were you homeless or living in a shelter (including now)? N   Transportation Needs   In the past 12 months, has lack of transportation kept you from medical appointments or from getting medications? no   In the past 12 months, has lack of transportation kept you from meetings, work, or from getting things needed for daily living? No        03/12/25 1500   Discharge Planning   Expected Discharge Disposition Home  (Patient to receive blood and then transition home. Pt denies home going needs. Pt able to arrange transport home when dc complete)

## 2025-03-15 LAB
BLOOD EXPIRATION DATE: NORMAL
DISPENSE STATUS: NORMAL
PRODUCT BLOOD TYPE: 8400
PRODUCT CODE: NORMAL
UNIT ABO: NORMAL
UNIT NUMBER: NORMAL
UNIT RH: NORMAL
UNIT VOLUME: 209

## 2025-03-15 NOTE — SIGNIFICANT EVENT
Follow Up Phone Call    Outgoing phone call    Spoke to: Dianne Clay Relationship:self   Phone number: [phone]      Outcome: I left a message on answering machine   No chief complaint on file.         Diagnosis:Not applicable

## 2025-03-17 ENCOUNTER — HOSPITAL ENCOUNTER (OUTPATIENT)
Dept: CARDIOLOGY | Facility: HOSPITAL | Age: 85
Discharge: HOME | End: 2025-03-17
Payer: MEDICARE

## 2025-03-17 ENCOUNTER — APPOINTMENT (OUTPATIENT)
Dept: CARDIOLOGY | Facility: CLINIC | Age: 85
End: 2025-03-17
Payer: MEDICARE

## 2025-03-17 VITALS
DIASTOLIC BLOOD PRESSURE: 78 MMHG | HEIGHT: 60 IN | OXYGEN SATURATION: 100 % | WEIGHT: 118 LBS | BODY MASS INDEX: 23.16 KG/M2 | SYSTOLIC BLOOD PRESSURE: 170 MMHG | HEART RATE: 67 BPM

## 2025-03-17 DIAGNOSIS — I48.91 ATRIAL FIBRILLATION, UNSPECIFIED TYPE (MULTI): ICD-10-CM

## 2025-03-17 DIAGNOSIS — I48.91 ATRIAL FIBRILLATION, UNSPECIFIED TYPE (MULTI): Primary | ICD-10-CM

## 2025-03-17 LAB
ATRIAL RATE: 86 BPM
P AXIS: 84 DEGREES
P OFFSET: 172 MS
P ONSET: 130 MS
PR INTERVAL: 188 MS
Q ONSET: 224 MS
QRS COUNT: 14 BEATS
QRS DURATION: 90 MS
QT INTERVAL: 408 MS
QTC CALCULATION(BAZETT): 488 MS
QTC FREDERICIA: 460 MS
R AXIS: 241 DEGREES
T AXIS: 63 DEGREES
T OFFSET: 428 MS
VENTRICULAR RATE: 86 BPM

## 2025-03-17 PROCEDURE — 1159F MED LIST DOCD IN RCRD: CPT | Performed by: INTERNAL MEDICINE

## 2025-03-17 PROCEDURE — 99215 OFFICE O/P EST HI 40 MIN: CPT | Performed by: INTERNAL MEDICINE

## 2025-03-17 PROCEDURE — 3078F DIAST BP <80 MM HG: CPT | Performed by: INTERNAL MEDICINE

## 2025-03-17 PROCEDURE — 1111F DSCHRG MED/CURRENT MED MERGE: CPT | Performed by: INTERNAL MEDICINE

## 2025-03-17 PROCEDURE — 93005 ELECTROCARDIOGRAM TRACING: CPT

## 2025-03-17 PROCEDURE — 3077F SYST BP >= 140 MM HG: CPT | Performed by: INTERNAL MEDICINE

## 2025-03-18 DIAGNOSIS — I48.91 ATRIAL FIBRILLATION, UNSPECIFIED TYPE (MULTI): ICD-10-CM

## 2025-03-18 LAB
ANION GAP SERPL CALCULATED.4IONS-SCNC: 11 MMOL/L (CALC) (ref 7–17)
BASOPHILS # BLD AUTO: 58 CELLS/UL (ref 0–200)
BASOPHILS NFR BLD AUTO: 0.8 %
BUN SERPL-MCNC: 7 MG/DL (ref 7–25)
BUN/CREAT SERPL: NORMAL (CALC) (ref 6–22)
CALCIUM SERPL-MCNC: 9.1 MG/DL (ref 8.6–10.4)
CHLORIDE SERPL-SCNC: 100 MMOL/L (ref 98–110)
CO2 SERPL-SCNC: 25 MMOL/L (ref 20–32)
CREAT SERPL-MCNC: 0.73 MG/DL (ref 0.6–0.95)
EGFRCR SERPLBLD CKD-EPI 2021: 81 ML/MIN/1.73M2
EOSINOPHIL # BLD AUTO: 183 CELLS/UL (ref 15–500)
EOSINOPHIL NFR BLD AUTO: 2.5 %
ERYTHROCYTE [DISTWIDTH] IN BLOOD BY AUTOMATED COUNT: 16.2 % (ref 11–15)
GLUCOSE SERPL-MCNC: 91 MG/DL (ref 65–99)
HCT VFR BLD AUTO: 32.8 % (ref 35–45)
HGB BLD-MCNC: 10.3 G/DL (ref 11.7–15.5)
IRON SATN MFR SERPL: 7 % (CALC) (ref 16–45)
IRON SERPL-MCNC: 23 MCG/DL (ref 45–160)
LYMPHOCYTES # BLD AUTO: 1511 CELLS/UL (ref 850–3900)
LYMPHOCYTES NFR BLD AUTO: 20.7 %
MCH RBC QN AUTO: 27.5 PG (ref 27–33)
MCHC RBC AUTO-ENTMCNC: 31.4 G/DL (ref 32–36)
MCV RBC AUTO: 87.7 FL (ref 80–100)
MONOCYTES # BLD AUTO: 445 CELLS/UL (ref 200–950)
MONOCYTES NFR BLD AUTO: 6.1 %
NEUTROPHILS # BLD AUTO: 5103 CELLS/UL (ref 1500–7800)
NEUTROPHILS NFR BLD AUTO: 69.9 %
PLATELET # BLD AUTO: 356 THOUSAND/UL (ref 140–400)
PMV BLD REES-ECKER: 10.8 FL (ref 7.5–12.5)
POTASSIUM SERPL-SCNC: 4.6 MMOL/L (ref 3.5–5.3)
RBC # BLD AUTO: 3.74 MILLION/UL (ref 3.8–5.1)
SODIUM SERPL-SCNC: 136 MMOL/L (ref 135–146)
TIBC SERPL-MCNC: 316 MCG/DL (CALC) (ref 250–450)
WBC # BLD AUTO: 7.3 THOUSAND/UL (ref 3.8–10.8)

## 2025-03-18 NOTE — PROGRESS NOTES
Primary Care Physician: Jyoti Carbajal DO    Date of Visit: 03/17/2025 11:00 AM EDT  Location of visit:  [facility]   Type of Visit: Follow up        DIAGNOSES: CAD.  S/p PCI (2003. [facility]).  Class II angina.  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 Mild to moderate PCPH (Cath 3/4/2025). Preserved LV systolic function.  PAF, maintained on SR on amiodarone.  HTN. History of GI bleed -recurrent on DOAC.   Hyperthyroidism on methimazole.      Chief Complaint   Patient presents with    Follow-up     Here for follow up after heart Cath        HPI / Summary:   Dianne Clay is a 85 y.o. female  who returns for routine follow up, after recent cardiac cath and recurrent GI bleed, although restarting low-dose apixaban, 2.5 twice daily.  There has been no further episodes of blood loss after discontinuation of oral anticoagulation.      12 system review is negative except as noted above        Medical History:   Past Medical History:   Diagnosis Date    Adrenal nodule     Allergic contact dermatitis, unspecified cause 11/07/2015    Allergic dermatitis    Anemia     Arthritis     Atrial fibrillation, chronic (Multi)     Contusion of unspecified knee, initial encounter 07/03/2018    Knee contusion    Diverticular hemorrhage     Effusion, unspecified knee 03/06/2019    Knee swelling    GERD (gastroesophageal reflux disease)     Heart attack     Hypertension     Hyperthyroidism     Noninfective gastroenteritis and colitis, unspecified 04/01/2022    Enteritis    Noninfective gastroenteritis and colitis, unspecified     Colitis    Other conditions influencing health status     Ulcer    PAD (peripheral artery disease) ([facility])     Pain in left knee 08/11/2014    Knee pain, left anterior    Personal history of other diseases of the musculoskeletal system and connective tissue     Personal history of arthritis    Pneumonia     Rectal bleeding     Rectal ulcer     S/P primary angioplasty with coronary stent        Social  History:   Tobacco Use: Low Risk  (3/17/2025)    Patient History     Smoking Tobacco Use: Never     Smokeless Tobacco Use: Never     Passive Exposure: Never         MEDICATIONS:   Current Outpatient Medications   Medication Instructions    amiodarone (Pacerone) 200 mg tablet Take 1 tablet by mouth once daily with a meal.    atorvastatin (LIPITOR) 40 mg, oral, Daily    FeroSuL 325 mg, oral, Daily, Take with food.    methIMAzole (TAPAZOLE) 5 mg, oral, Daily    pantoprazole (ProtoNix) 40 mg EC tablet TAKE 1 TABLET BY MOUTH ONCE DAILY           LABS:    CBC with Differential:    Lab Results   Component Value Date    WBC 7.5 03/12/2025    RBC 2.53 (L) 03/12/2025    HGB 8.4 (L) 03/12/2025    HCT 26.3 (L) 03/12/2025     03/12/2025    MCV 87 03/12/2025    MCH 27.3 03/12/2025    MCHC 31.5 (L) 03/12/2025    RDW 17.0 (H) 03/12/2025    NRBC 0.0 03/12/2025    LYMPHOPCT 16.6 03/12/2025    MONOPCT 7.6 03/12/2025    EOSPCT 1.9 03/12/2025    BASOPCT 0.7 03/12/2025    MONOSABS 0.57 03/12/2025    LYMPHSABS 1.24 03/12/2025    EOSABS 0.14 03/12/2025    BASOSABS 0.05 03/12/2025     CMP:    Lab Results   Component Value Date     03/12/2025    K 3.7 03/12/2025     03/12/2025    CO2 26 03/12/2025    BUN 11 03/12/2025    CREATININE 0.76 03/12/2025    GLUCOSE 96 03/12/2025    PROT 8.7 (H) 03/10/2025    CALCIUM 7.8 (L) 03/12/2025    BILITOT 0.6 03/10/2025    ALKPHOS 80 03/10/2025    AST 18 03/10/2025    ALT 10 03/10/2025     BMP:    Lab Results   Component Value Date     03/12/2025    K 3.7 03/12/2025     03/12/2025    CO2 26 03/12/2025    BUN 11 03/12/2025    CREATININE 0.76 03/12/2025    CALCIUM 7.8 (L) 03/12/2025    GLUCOSE 96 03/12/2025     Magnesium:  Lab Results   Component Value Date    MG 1.59 (L) 03/12/2025     Troponin:    Lab Results   Component Value Date    TROPHS 7 03/10/2025    TROPHS 6 03/10/2025    TROPHS 6 03/10/2025     BNP:   Lab Results   Component Value Date     (H) 03/10/2025          Lipid Panel:  Lab Results   Component Value Date    HDL 51.3 01/14/2025    CHHDL 2.7 01/14/2025    VLDL 15 11/21/2022    TRIG 75 11/21/2022        Lab work and imaging results independently reviewed by me     Visit Vitals  /78   Pulse 67   Ht 1.524 m (5')   Wt 53.5 kg (118 lb)   LMP  (LMP Unknown)   SpO2 100%   BMI 23.05 kg/m²   OB Status Postmenopausal   Smoking Status Never   BSA 1.5 m²           Physical Exam  On examination, she was comfortably sitting.  HEENT: normal, Neck: supple, Carotids: brisk upstroke, JVP: normal, CVS: Rate and rhythm regular, S1S2, Chest: Emphysematous.  CTAB, Abdomen: soft nontender, no organomegaly appreciated, Extremities: without any edema, peripheral pulses normal. CNS: HMF normal, no focal neurological deficit     DIAGNOSES: CAD.  S/p PCI (2003. [facility]).  Class II angina.  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 Mild to moderate PCPH (Cath 3/4/2025). Preserved LV systolic function.  PAF, maintained on SR on amiodarone.  HTN. History of GI bleed -recurrent on DOAC.   Hyperthyroidism on methimazole.     I had a detailed discussion with her regarding left atrial appendage occlusion, preceded by pulmonary vein isolation, the latter helping to discontinue amiodarone, especially in the face of hypothyroidism.  I have requested CT angiogram of the chest, LAAO protocol.     Thank you so much for the opportunity to participate in the care of this very pleasant lady. Please do not hesitate to contact me if I could be of any assistance in her future care.    Sincerely        Siddhartha Queen M.D.        03/18/25 at 12:06 AM - Flavio Queen MD      Orders:  Orders Placed This Encounter   Procedures    CBC and Auto Differential    Basic Metabolic Panel    Iron and TIBC    ECG 12 Lead         Followup Appts:  Future Appointments   Date Time Provider Department Center   4/21/2025 10:40 AM Flavio Queen MD [id number]   6/23/2025  9:20 AM  MD [id number] [place]

## 2025-03-26 ASSESSMENT — HEART SCORE
TROPONIN: LESS THAN OR EQUAL TO NORMAL LIMIT
HEART SCORE: 7
AGE: 65+
HISTORY: HIGHLY SUSPICIOUS
ECG: NON-SPECIFIC REPOLARIZATION DISTURBANCE
RISK FACTORS: >2 RISK FACTORS OR HX OF ATHEROSCLEROTIC DISEASE

## 2025-03-26 NOTE — ED PROCEDURE NOTE
Procedure  Critical Care    Performed by: Yoli Berry MD  Authorized by: Yoli Berry MD    Critical care provider statement:     Critical care time (minutes):  20    Critical care time was exclusive of:  Separately billable procedures and treating other patients    Critical care was necessary to treat or prevent imminent or life-threatening deterioration of the following conditions:  Circulatory failure, dehydration and shock    Critical care was time spent personally by me on the following activities:  Development of treatment plan with patient or surrogate, evaluation of patient's response to treatment, examination of patient, ordering and performing treatments and interventions, ordering and review of laboratory studies, ordering and review of radiographic studies, pulse oximetry, re-evaluation of patient's condition and review of old charts    Care discussed with: accepting provider at another facility                 Yoli Berry MD  03/26/25 [id number]

## 2025-03-27 DIAGNOSIS — D50.9 IRON DEFICIENCY ANEMIA, UNSPECIFIED IRON DEFICIENCY ANEMIA TYPE: ICD-10-CM

## 2025-03-27 DIAGNOSIS — K21.9 GASTROESOPHAGEAL REFLUX DISEASE WITHOUT ESOPHAGITIS: ICD-10-CM

## 2025-03-27 DIAGNOSIS — I10 ESSENTIAL (PRIMARY) HYPERTENSION: ICD-10-CM

## 2025-03-27 DIAGNOSIS — I48.91 ATRIAL FIBRILLATION, UNSPECIFIED TYPE (MULTI): ICD-10-CM

## 2025-03-28 RX ORDER — ATORVASTATIN CALCIUM 40 MG/1
40 TABLET, FILM COATED ORAL DAILY
Qty: 30 TABLET | Refills: 0 | Status: SHIPPED | OUTPATIENT
Start: 2025-03-28

## 2025-03-28 RX ORDER — AMIODARONE HYDROCHLORIDE 200 MG/1
TABLET ORAL
Qty: 30 TABLET | Refills: 0 | Status: SHIPPED | OUTPATIENT
Start: 2025-03-28

## 2025-03-28 RX ORDER — PANTOPRAZOLE SODIUM 40 MG/1
TABLET, DELAYED RELEASE ORAL
Qty: 30 TABLET | Refills: 0 | Status: SHIPPED | OUTPATIENT
Start: 2025-03-28

## 2025-04-08 ENCOUNTER — HOSPITAL ENCOUNTER (OUTPATIENT)
Dept: RADIOLOGY | Facility: HOSPITAL | Age: 85
Discharge: HOME | End: 2025-04-08
Payer: MEDICARE

## 2025-04-08 DIAGNOSIS — I48.91 ATRIAL FIBRILLATION, UNSPECIFIED TYPE (MULTI): ICD-10-CM

## 2025-04-08 PROCEDURE — 2550000001 HC RX 255 CONTRASTS: Performed by: INTERNAL MEDICINE

## 2025-04-08 PROCEDURE — 75572 CT HRT W/3D IMAGE: CPT

## 2025-04-08 PROCEDURE — 75572 CT HRT W/3D IMAGE: CPT | Performed by: RADIOLOGY

## 2025-04-08 RX ADMIN — IOHEXOL 75 ML: 350 INJECTION, SOLUTION INTRAVENOUS at 12:55

## 2025-04-21 ENCOUNTER — ANESTHESIA (OUTPATIENT)
Dept: CARDIOLOGY | Facility: CLINIC | Age: 85
End: 2025-04-21

## 2025-04-21 ENCOUNTER — ANESTHESIA EVENT (OUTPATIENT)
Dept: CARDIOLOGY | Facility: CLINIC | Age: 85
End: 2025-04-21

## 2025-04-21 ENCOUNTER — APPOINTMENT (OUTPATIENT)
Dept: CARDIOLOGY | Facility: CLINIC | Age: 85
End: 2025-04-21
Payer: MEDICARE

## 2025-05-01 DIAGNOSIS — I10 ESSENTIAL (PRIMARY) HYPERTENSION: ICD-10-CM

## 2025-05-01 DIAGNOSIS — K21.9 GASTROESOPHAGEAL REFLUX DISEASE WITHOUT ESOPHAGITIS: ICD-10-CM

## 2025-05-01 DIAGNOSIS — I48.91 ATRIAL FIBRILLATION, UNSPECIFIED TYPE (MULTI): ICD-10-CM

## 2025-05-01 DIAGNOSIS — D50.9 IRON DEFICIENCY ANEMIA, UNSPECIFIED IRON DEFICIENCY ANEMIA TYPE: ICD-10-CM

## 2025-05-01 RX ORDER — ATORVASTATIN CALCIUM 40 MG/1
40 TABLET, FILM COATED ORAL DAILY
Qty: 30 TABLET | Refills: 0 | Status: SHIPPED | OUTPATIENT
Start: 2025-05-01

## 2025-05-01 RX ORDER — PANTOPRAZOLE SODIUM 40 MG/1
40 TABLET, DELAYED RELEASE ORAL DAILY
Qty: 30 TABLET | Refills: 0 | Status: SHIPPED | OUTPATIENT
Start: 2025-05-01

## 2025-05-01 RX ORDER — AMIODARONE HYDROCHLORIDE 200 MG/1
200 TABLET ORAL DAILY
Qty: 30 TABLET | Refills: 0 | Status: SHIPPED | OUTPATIENT
Start: 2025-05-01

## 2025-05-06 DIAGNOSIS — I48.91 ATRIAL FIBRILLATION, UNSPECIFIED TYPE (MULTI): ICD-10-CM

## 2025-05-07 ENCOUNTER — HOSPITAL ENCOUNTER (OUTPATIENT)
Facility: HOSPITAL | Age: 85
Setting detail: OUTPATIENT SURGERY
End: 2025-05-07
Attending: INTERNAL MEDICINE | Admitting: INTERNAL MEDICINE
Payer: MEDICARE

## 2025-05-07 DIAGNOSIS — I48.91 ATRIAL FIBRILLATION, UNSPECIFIED TYPE (MULTI): ICD-10-CM

## 2025-05-07 NOTE — PROGRESS NOTES
Spoke to patient re; ablation. Instructed patient NPO after midnight, may take medications with a little sip of water that morning of procedure. Pt is to get blood work 1 wk prior to cath.  Pt verbalized understanding of all directions. Pt is not on a blood thinner

## 2025-06-23 ENCOUNTER — APPOINTMENT (OUTPATIENT)
Dept: CARDIOLOGY | Facility: CLINIC | Age: 85
End: 2025-06-23
Payer: MEDICARE

## 2025-06-29 DIAGNOSIS — I10 ESSENTIAL (PRIMARY) HYPERTENSION: ICD-10-CM

## 2025-06-29 DIAGNOSIS — I48.91 ATRIAL FIBRILLATION, UNSPECIFIED TYPE (MULTI): ICD-10-CM

## 2025-06-29 DIAGNOSIS — K21.9 GASTROESOPHAGEAL REFLUX DISEASE WITHOUT ESOPHAGITIS: ICD-10-CM

## 2025-06-29 DIAGNOSIS — D50.9 IRON DEFICIENCY ANEMIA, UNSPECIFIED IRON DEFICIENCY ANEMIA TYPE: ICD-10-CM

## 2025-06-30 RX ORDER — ATORVASTATIN CALCIUM 40 MG/1
40 TABLET, FILM COATED ORAL DAILY
Qty: 30 TABLET | Refills: 0 | Status: SHIPPED | OUTPATIENT
Start: 2025-06-30

## 2025-06-30 RX ORDER — AMIODARONE HYDROCHLORIDE 200 MG/1
200 TABLET ORAL DAILY
Qty: 30 TABLET | Refills: 0 | Status: SHIPPED | OUTPATIENT
Start: 2025-06-30

## 2025-06-30 RX ORDER — PANTOPRAZOLE SODIUM 40 MG/1
40 TABLET, DELAYED RELEASE ORAL DAILY
Qty: 30 TABLET | Refills: 0 | Status: SHIPPED | OUTPATIENT
Start: 2025-06-30

## 2025-07-21 ENCOUNTER — APPOINTMENT (OUTPATIENT)
Dept: PRIMARY CARE | Facility: CLINIC | Age: 85
End: 2025-07-21
Payer: MEDICARE

## 2025-07-21 VITALS
HEART RATE: 81 BPM | WEIGHT: 111 LBS | OXYGEN SATURATION: 97 % | SYSTOLIC BLOOD PRESSURE: 110 MMHG | BODY MASS INDEX: 21.68 KG/M2 | DIASTOLIC BLOOD PRESSURE: 52 MMHG | TEMPERATURE: 96.7 F

## 2025-07-21 DIAGNOSIS — R63.4 WEIGHT LOSS, UNINTENTIONAL: ICD-10-CM

## 2025-07-21 DIAGNOSIS — D50.0 IRON DEFICIENCY ANEMIA DUE TO CHRONIC BLOOD LOSS: ICD-10-CM

## 2025-07-21 DIAGNOSIS — K62.5 RECTAL BLEEDING: Primary | ICD-10-CM

## 2025-07-21 PROCEDURE — 3074F SYST BP LT 130 MM HG: CPT | Performed by: FAMILY MEDICINE

## 2025-07-21 PROCEDURE — 3078F DIAST BP <80 MM HG: CPT | Performed by: FAMILY MEDICINE

## 2025-07-21 PROCEDURE — 1036F TOBACCO NON-USER: CPT | Performed by: FAMILY MEDICINE

## 2025-07-21 PROCEDURE — 99214 OFFICE O/P EST MOD 30 MIN: CPT | Performed by: FAMILY MEDICINE

## 2025-07-21 PROCEDURE — 1159F MED LIST DOCD IN RCRD: CPT | Performed by: FAMILY MEDICINE

## 2025-07-21 RX ORDER — HYDROCORTISONE 25 MG/G
CREAM TOPICAL 2 TIMES DAILY
COMMUNITY
Start: 2025-07-16

## 2025-07-21 ASSESSMENT — ENCOUNTER SYMPTOMS
CHILLS: 1
ACTIVITY CHANGE: 1
FEVER: 0
ARTHRALGIAS: 0
JOINT SWELLING: 0
MYALGIAS: 0
WEAKNESS: 0
BLOOD IN STOOL: 1
FATIGUE: 1
SLEEP DISTURBANCE: 0
HEADACHES: 0
DYSPHORIC MOOD: 0
NUMBNESS: 0
HEMATURIA: 0
VOMITING: 0
FLANK PAIN: 0
RECTAL PAIN: 1
APPETITE CHANGE: 1
EYE ITCHING: 0
CONSTIPATION: 0
DYSURIA: 0
DIZZINESS: 0
SORE THROAT: 0
DIARRHEA: 0
RHINORRHEA: 0
NERVOUS/ANXIOUS: 0
COUGH: 0
ABDOMINAL PAIN: 1
UNEXPECTED WEIGHT CHANGE: 0
SHORTNESS OF BREATH: 0
EYE DISCHARGE: 0
WHEEZING: 0
LIGHT-HEADEDNESS: 0
NAUSEA: 0
PALPITATIONS: 0
SINUS PRESSURE: 0

## 2025-07-21 NOTE — PROGRESS NOTES
Subjective   Patient ID: Dianne Clay is a 85 y.o. female who presents for Hospital Follow-up ([facility]- RECTAL BLEEDING- LAB, EKG,  AND CT COMPLETED-HEMORRHOIDS/NO CONTINUED CONCERNS.).  HPI  NO BLEEDING NOW   MULTIPLE HEALTH ISSUES  Review of Systems   Constitutional:  Positive for activity change, appetite change, chills and fatigue. Negative for fever and unexpected weight change.   HENT:  Negative for congestion, ear pain, postnasal drip, rhinorrhea, sinus pressure and sore throat.    Eyes:  Negative for discharge, itching and visual disturbance.   Respiratory:  Negative for cough, shortness of breath and wheezing.    Cardiovascular:  Negative for chest pain, palpitations and leg swelling.   Gastrointestinal:  Positive for abdominal pain, blood in stool and rectal pain. Negative for constipation, diarrhea, nausea and vomiting.   Endocrine: Negative for cold intolerance, heat intolerance and polyuria.   Genitourinary:  Negative for dysuria, flank pain and hematuria.   Musculoskeletal:  Negative for arthralgias, gait problem, joint swelling and myalgias.   Skin:  Negative for rash.   Allergic/Immunologic: Negative for environmental allergies and food allergies.   Neurological:  Negative for dizziness, syncope, weakness, light-headedness, numbness and headaches.   Psychiatric/Behavioral:  Negative for dysphoric mood and sleep disturbance. The patient is not nervous/anxious.            3/12/2025     1:00 PM 3/12/2025     1:15 PM 3/12/2025     1:30 PM 3/12/2025     1:43 PM 3/12/2025     1:45 PM 3/17/2025    11:07 AM 7/21/2025    11:31 AM   Vitals   Systolic 134 126 118 118 121 170 110   Diastolic 59 61 58 58 56 78 52   Heart Rate 83 82 81 84 84 67 81   Temp 37 °C (98.6 °F)   37 °C (98.6 °F) 37 °C (98.6 °F)  35.9 °C (96.7 °F)   Resp 16   20 20     Height      1.524 m (5')    Weight (lb)      118 111   BMI      23.05 kg/m2 21.68 kg/m2   BSA (m2)      1.5 m2 1.46 m2   Visit Report      Report Report       Body mass index  is 21.68 kg/m².      Objective   Physical Exam  Vitals and nursing note reviewed.   Constitutional:       Appearance: Normal appearance.   HENT:      Head: Normocephalic.     Cardiovascular:      Rate and Rhythm: Normal rate and regular rhythm.      Pulses: Normal pulses.      Heart sounds: Normal heart sounds. No murmur heard.     No friction rub. No gallop.   Pulmonary:      Effort: Pulmonary effort is normal. No respiratory distress.      Breath sounds: Normal breath sounds. No wheezing.   Abdominal:      General: Bowel sounds are normal. There is no distension.      Palpations: Abdomen is soft.      Tenderness: There is no abdominal tenderness.     Musculoskeletal:         General: No deformity. Normal range of motion.     Skin:     General: Skin is warm and dry.      Capillary Refill: Capillary refill takes less than 2 seconds.     Neurological:      General: No focal deficit present.      Mental Status: She is alert and oriented to person, place, and time.     Psychiatric:         Mood and Affect: Mood normal.         Assessment/Plan   Problem List Items Addressed This Visit       Anemia    Rectal bleeding - Primary    Weight loss, unintentional          Jyoti Carbajal DO

## 2025-07-21 NOTE — PATIENT INSTRUCTIONS
KEEP BOWEL MOVEMENTS SOFT AND EASY TO PASS  COLACE A STOOL SOFTENER   APPLESAUCE PRUNES /BANANAS   SEE IN ONE MONTH TO CHECK WEIGHT AND B/P AND DO CBC AND IRON

## 2025-07-30 DIAGNOSIS — I10 ESSENTIAL (PRIMARY) HYPERTENSION: ICD-10-CM

## 2025-07-31 RX ORDER — ATORVASTATIN CALCIUM 40 MG/1
40 TABLET, FILM COATED ORAL DAILY
Qty: 30 TABLET | Refills: 0 | Status: ON HOLD | OUTPATIENT
Start: 2025-07-31

## 2025-08-02 ENCOUNTER — HOSPITAL ENCOUNTER (EMERGENCY)
Facility: HOSPITAL | Age: 85
Discharge: ANOTHER HOSPITAL | End: 2025-08-03
Attending: STUDENT IN AN ORGANIZED HEALTH CARE EDUCATION/TRAINING PROGRAM
Payer: MEDICARE

## 2025-08-02 ENCOUNTER — APPOINTMENT (OUTPATIENT)
Dept: CARDIOLOGY | Facility: HOSPITAL | Age: 85
End: 2025-08-02
Payer: MEDICARE

## 2025-08-02 DIAGNOSIS — K92.2 GASTROINTESTINAL HEMORRHAGE, UNSPECIFIED GASTROINTESTINAL HEMORRHAGE TYPE: Primary | ICD-10-CM

## 2025-08-02 DIAGNOSIS — E87.6 HYPOKALEMIA: ICD-10-CM

## 2025-08-02 DIAGNOSIS — D64.9 ANEMIA, UNSPECIFIED TYPE: ICD-10-CM

## 2025-08-02 LAB
APTT PPP: 27 SECONDS (ref 26–36)
BASOPHILS # BLD AUTO: 0.03 X10*3/UL (ref 0–0.1)
BASOPHILS NFR BLD AUTO: 0.5 %
EOSINOPHIL # BLD AUTO: 0.07 X10*3/UL (ref 0–0.4)
EOSINOPHIL NFR BLD AUTO: 1.1 %
ERYTHROCYTE [DISTWIDTH] IN BLOOD BY AUTOMATED COUNT: 21.8 % (ref 11.5–14.5)
HCT VFR BLD AUTO: 23.5 % (ref 36–46)
HGB BLD-MCNC: 7.4 G/DL (ref 12–16)
IMM GRANULOCYTES # BLD AUTO: 0.04 X10*3/UL (ref 0–0.5)
IMM GRANULOCYTES NFR BLD AUTO: 0.6 % (ref 0–0.9)
INR PPP: 1.1 (ref 0.9–1.1)
LYMPHOCYTES # BLD AUTO: 2.23 X10*3/UL (ref 0.8–3)
LYMPHOCYTES NFR BLD AUTO: 34.7 %
MCH RBC QN AUTO: 27.4 PG (ref 26–34)
MCHC RBC AUTO-ENTMCNC: 31.5 G/DL (ref 32–36)
MCV RBC AUTO: 87 FL (ref 80–100)
MONOCYTES # BLD AUTO: 0.51 X10*3/UL (ref 0.05–0.8)
MONOCYTES NFR BLD AUTO: 7.9 %
NEUTROPHILS # BLD AUTO: 3.54 X10*3/UL (ref 1.6–5.5)
NEUTROPHILS NFR BLD AUTO: 55.2 %
NRBC BLD-RTO: 0 /100 WBCS (ref 0–0)
OVALOCYTES BLD QL SMEAR: NORMAL
PLATELET # BLD AUTO: 266 X10*3/UL (ref 150–450)
PROTHROMBIN TIME: 12.5 SECONDS (ref 9.8–12.4)
RBC # BLD AUTO: 2.7 X10*6/UL (ref 4–5.2)
RBC MORPH BLD: NORMAL
SCHISTOCYTES BLD QL SMEAR: NORMAL
WBC # BLD AUTO: 6.4 X10*3/UL (ref 4.4–11.3)

## 2025-08-02 PROCEDURE — 36415 COLL VENOUS BLD VENIPUNCTURE: CPT | Performed by: STUDENT IN AN ORGANIZED HEALTH CARE EDUCATION/TRAINING PROGRAM

## 2025-08-02 PROCEDURE — 84484 ASSAY OF TROPONIN QUANT: CPT | Performed by: STUDENT IN AN ORGANIZED HEALTH CARE EDUCATION/TRAINING PROGRAM

## 2025-08-02 PROCEDURE — 99291 CRITICAL CARE FIRST HOUR: CPT | Performed by: STUDENT IN AN ORGANIZED HEALTH CARE EDUCATION/TRAINING PROGRAM

## 2025-08-02 PROCEDURE — 85610 PROTHROMBIN TIME: CPT | Performed by: STUDENT IN AN ORGANIZED HEALTH CARE EDUCATION/TRAINING PROGRAM

## 2025-08-02 PROCEDURE — 85730 THROMBOPLASTIN TIME PARTIAL: CPT | Performed by: STUDENT IN AN ORGANIZED HEALTH CARE EDUCATION/TRAINING PROGRAM

## 2025-08-02 PROCEDURE — 93005 ELECTROCARDIOGRAM TRACING: CPT

## 2025-08-02 PROCEDURE — 80053 COMPREHEN METABOLIC PANEL: CPT | Performed by: STUDENT IN AN ORGANIZED HEALTH CARE EDUCATION/TRAINING PROGRAM

## 2025-08-02 PROCEDURE — 83880 ASSAY OF NATRIURETIC PEPTIDE: CPT | Performed by: STUDENT IN AN ORGANIZED HEALTH CARE EDUCATION/TRAINING PROGRAM

## 2025-08-02 PROCEDURE — 85025 COMPLETE CBC W/AUTO DIFF WBC: CPT | Performed by: STUDENT IN AN ORGANIZED HEALTH CARE EDUCATION/TRAINING PROGRAM

## 2025-08-02 ASSESSMENT — PAIN SCALES - GENERAL: PAINLEVEL_OUTOF10: 0 - NO PAIN

## 2025-08-02 ASSESSMENT — PAIN - FUNCTIONAL ASSESSMENT: PAIN_FUNCTIONAL_ASSESSMENT: 0-10

## 2025-08-03 ENCOUNTER — HOSPITAL ENCOUNTER (INPATIENT)
Facility: HOSPITAL | Age: 85
End: 2025-08-03
Attending: INTERNAL MEDICINE | Admitting: INTERNAL MEDICINE
Payer: MEDICARE

## 2025-08-03 ENCOUNTER — HOSPITAL ENCOUNTER (INPATIENT)
Age: 85
End: 2025-08-03
Attending: INTERNAL MEDICINE | Admitting: INTERNAL MEDICINE
Payer: MEDICARE

## 2025-08-03 ENCOUNTER — APPOINTMENT (OUTPATIENT)
Dept: RADIOLOGY | Facility: HOSPITAL | Age: 85
End: 2025-08-03
Payer: MEDICARE

## 2025-08-03 VITALS
SYSTOLIC BLOOD PRESSURE: 167 MMHG | HEART RATE: 87 BPM | BODY MASS INDEX: 19.67 KG/M2 | WEIGHT: 111 LBS | OXYGEN SATURATION: 100 % | DIASTOLIC BLOOD PRESSURE: 72 MMHG | RESPIRATION RATE: 18 BRPM | HEIGHT: 63 IN | TEMPERATURE: 97.5 F

## 2025-08-03 DIAGNOSIS — K92.2 GASTROINTESTINAL HEMORRHAGE, UNSPECIFIED GASTROINTESTINAL HEMORRHAGE TYPE: ICD-10-CM

## 2025-08-03 DIAGNOSIS — K92.2 GI BLEED: Primary | ICD-10-CM

## 2025-08-03 LAB
ABO GROUP (TYPE) IN BLOOD: NORMAL
ALBUMIN SERPL BCP-MCNC: 2.9 G/DL (ref 3.4–5)
ALP SERPL-CCNC: 77 U/L (ref 33–136)
ALT SERPL W P-5'-P-CCNC: 9 U/L (ref 7–45)
ANION GAP SERPL CALC-SCNC: 12 MMOL/L (ref 10–20)
ANION GAP SERPL CALC-SCNC: 16 MMOL/L (ref 10–20)
ANTIBODY SCREEN: NORMAL
APPEARANCE UR: CLEAR
AST SERPL W P-5'-P-CCNC: 16 U/L (ref 9–39)
BILIRUB SERPL-MCNC: 0.8 MG/DL (ref 0–1.2)
BILIRUB UR STRIP.AUTO-MCNC: NEGATIVE MG/DL
BNP SERPL-MCNC: 180 PG/ML (ref 0–99)
BUN SERPL-MCNC: 15 MG/DL (ref 6–23)
BUN SERPL-MCNC: 6 MG/DL (ref 6–23)
CALCIUM SERPL-MCNC: 7.4 MG/DL (ref 8.6–10.3)
CALCIUM SERPL-MCNC: 7.9 MG/DL (ref 8.6–10.3)
CARDIAC TROPONIN I PNL SERPL HS: 9 NG/L (ref 0–13)
CARDIAC TROPONIN I PNL SERPL HS: 9 NG/L (ref 0–13)
CHLORIDE SERPL-SCNC: 104 MMOL/L (ref 98–107)
CHLORIDE SERPL-SCNC: 110 MMOL/L (ref 98–107)
CO2 SERPL-SCNC: 20 MMOL/L (ref 21–32)
CO2 SERPL-SCNC: 20 MMOL/L (ref 21–32)
COLOR UR: COLORLESS
CREAT SERPL-MCNC: 0.52 MG/DL (ref 0.5–1.05)
CREAT SERPL-MCNC: 0.85 MG/DL (ref 0.5–1.05)
EGFRCR SERPLBLD CKD-EPI 2021: 68 ML/MIN/1.73M*2
EGFRCR SERPLBLD CKD-EPI 2021: >90 ML/MIN/1.73M*2
ERYTHROCYTE [DISTWIDTH] IN BLOOD BY AUTOMATED COUNT: 19.3 % (ref 11.5–14.5)
GLUCOSE SERPL-MCNC: 170 MG/DL (ref 74–99)
GLUCOSE SERPL-MCNC: 84 MG/DL (ref 74–99)
GLUCOSE UR STRIP.AUTO-MCNC: NORMAL MG/DL
HCT VFR BLD AUTO: 19.4 % (ref 36–46)
HCT VFR BLD AUTO: 27.5 % (ref 36–46)
HCT VFR BLD AUTO: 29 % (ref 36–46)
HGB BLD-MCNC: 5.9 G/DL (ref 12–16)
HGB BLD-MCNC: 9.1 G/DL (ref 12–16)
HGB BLD-MCNC: 9.4 G/DL (ref 12–16)
KETONES UR STRIP.AUTO-MCNC: NEGATIVE MG/DL
LACTATE SERPL-SCNC: 1.8 MMOL/L (ref 0.4–2)
LEUKOCYTE ESTERASE UR QL STRIP.AUTO: NEGATIVE
MAGNESIUM SERPL-MCNC: 1.86 MG/DL (ref 1.6–2.4)
MCH RBC QN AUTO: 28.1 PG (ref 26–34)
MCHC RBC AUTO-ENTMCNC: 32.4 G/DL (ref 32–36)
MCV RBC AUTO: 87 FL (ref 80–100)
NITRITE UR QL STRIP.AUTO: NEGATIVE
NRBC BLD-RTO: 0 /100 WBCS (ref 0–0)
PH UR STRIP.AUTO: 5.5 [PH]
PLATELET # BLD AUTO: 207 X10*3/UL (ref 150–450)
POTASSIUM SERPL-SCNC: 2.9 MMOL/L (ref 3.5–5.3)
POTASSIUM SERPL-SCNC: 3.4 MMOL/L (ref 3.5–5.3)
PROT SERPL-MCNC: 7.6 G/DL (ref 6.4–8.2)
PROT UR STRIP.AUTO-MCNC: NEGATIVE MG/DL
RBC # BLD AUTO: 3.34 X10*6/UL (ref 4–5.2)
RBC # UR STRIP.AUTO: NEGATIVE MG/DL
RH FACTOR (ANTIGEN D): NORMAL
SODIUM SERPL-SCNC: 137 MMOL/L (ref 136–145)
SODIUM SERPL-SCNC: 139 MMOL/L (ref 136–145)
SP GR UR STRIP.AUTO: 1.02
UROBILINOGEN UR STRIP.AUTO-MCNC: NORMAL MG/DL
WBC # BLD AUTO: 7.4 X10*3/UL (ref 4.4–11.3)

## 2025-08-03 PROCEDURE — 2020000001 HC ICU ROOM DAILY

## 2025-08-03 PROCEDURE — 99222 1ST HOSP IP/OBS MODERATE 55: CPT | Performed by: INTERNAL MEDICINE

## 2025-08-03 PROCEDURE — 70450 CT HEAD/BRAIN W/O DYE: CPT | Performed by: STUDENT IN AN ORGANIZED HEALTH CARE EDUCATION/TRAINING PROGRAM

## 2025-08-03 PROCEDURE — 96365 THER/PROPH/DIAG IV INF INIT: CPT

## 2025-08-03 PROCEDURE — 74174 CTA ABD&PLVS W/CONTRAST: CPT | Mod: FOREIGN READ | Performed by: RADIOLOGY

## 2025-08-03 PROCEDURE — 2500000002 HC RX 250 W HCPCS SELF ADMINISTERED DRUGS (ALT 637 FOR MEDICARE OP, ALT 636 FOR OP/ED): Performed by: NURSE PRACTITIONER

## 2025-08-03 PROCEDURE — 86923 COMPATIBILITY TEST ELECTRIC: CPT

## 2025-08-03 PROCEDURE — 36415 COLL VENOUS BLD VENIPUNCTURE: CPT | Performed by: STUDENT IN AN ORGANIZED HEALTH CARE EDUCATION/TRAINING PROGRAM

## 2025-08-03 PROCEDURE — 74174 CTA ABD&PLVS W/CONTRAST: CPT

## 2025-08-03 PROCEDURE — 2500000004 HC RX 250 GENERAL PHARMACY W/ HCPCS (ALT 636 FOR OP/ED): Performed by: NURSE PRACTITIONER

## 2025-08-03 PROCEDURE — 81003 URINALYSIS AUTO W/O SCOPE: CPT | Performed by: STUDENT IN AN ORGANIZED HEALTH CARE EDUCATION/TRAINING PROGRAM

## 2025-08-03 PROCEDURE — 85014 HEMATOCRIT: CPT

## 2025-08-03 PROCEDURE — 90471 IMMUNIZATION ADMIN: CPT | Performed by: STUDENT IN AN ORGANIZED HEALTH CARE EDUCATION/TRAINING PROGRAM

## 2025-08-03 PROCEDURE — 83605 ASSAY OF LACTIC ACID: CPT | Performed by: INTERNAL MEDICINE

## 2025-08-03 PROCEDURE — 2550000001 HC RX 255 CONTRASTS: Performed by: STUDENT IN AN ORGANIZED HEALTH CARE EDUCATION/TRAINING PROGRAM

## 2025-08-03 PROCEDURE — 99291 CRITICAL CARE FIRST HOUR: CPT | Performed by: INTERNAL MEDICINE

## 2025-08-03 PROCEDURE — 2500000001 HC RX 250 WO HCPCS SELF ADMINISTERED DRUGS (ALT 637 FOR MEDICARE OP): Performed by: INTERNAL MEDICINE

## 2025-08-03 PROCEDURE — 96366 THER/PROPH/DIAG IV INF ADDON: CPT

## 2025-08-03 PROCEDURE — 2500000001 HC RX 250 WO HCPCS SELF ADMINISTERED DRUGS (ALT 637 FOR MEDICARE OP): Performed by: NURSE PRACTITIONER

## 2025-08-03 PROCEDURE — 72125 CT NECK SPINE W/O DYE: CPT | Performed by: STUDENT IN AN ORGANIZED HEALTH CARE EDUCATION/TRAINING PROGRAM

## 2025-08-03 PROCEDURE — 85014 HEMATOCRIT: CPT | Performed by: STUDENT IN AN ORGANIZED HEALTH CARE EDUCATION/TRAINING PROGRAM

## 2025-08-03 PROCEDURE — 2500000004 HC RX 250 GENERAL PHARMACY W/ HCPCS (ALT 636 FOR OP/ED): Performed by: STUDENT IN AN ORGANIZED HEALTH CARE EDUCATION/TRAINING PROGRAM

## 2025-08-03 PROCEDURE — 72125 CT NECK SPINE W/O DYE: CPT

## 2025-08-03 PROCEDURE — 83735 ASSAY OF MAGNESIUM: CPT | Performed by: STUDENT IN AN ORGANIZED HEALTH CARE EDUCATION/TRAINING PROGRAM

## 2025-08-03 PROCEDURE — 90715 TDAP VACCINE 7 YRS/> IM: CPT | Performed by: STUDENT IN AN ORGANIZED HEALTH CARE EDUCATION/TRAINING PROGRAM

## 2025-08-03 PROCEDURE — 99223 1ST HOSP IP/OBS HIGH 75: CPT | Performed by: NURSE PRACTITIONER

## 2025-08-03 PROCEDURE — 86901 BLOOD TYPING SEROLOGIC RH(D): CPT | Performed by: STUDENT IN AN ORGANIZED HEALTH CARE EDUCATION/TRAINING PROGRAM

## 2025-08-03 PROCEDURE — 80048 BASIC METABOLIC PNL TOTAL CA: CPT | Performed by: NURSE PRACTITIONER

## 2025-08-03 PROCEDURE — 96375 TX/PRO/DX INJ NEW DRUG ADDON: CPT

## 2025-08-03 PROCEDURE — 70450 CT HEAD/BRAIN W/O DYE: CPT

## 2025-08-03 PROCEDURE — 84484 ASSAY OF TROPONIN QUANT: CPT | Performed by: STUDENT IN AN ORGANIZED HEALTH CARE EDUCATION/TRAINING PROGRAM

## 2025-08-03 PROCEDURE — 36430 TRANSFUSION BLD/BLD COMPNT: CPT

## 2025-08-03 PROCEDURE — P9016 RBC LEUKOCYTES REDUCED: HCPCS

## 2025-08-03 PROCEDURE — 85027 COMPLETE CBC AUTOMATED: CPT | Performed by: NURSE PRACTITIONER

## 2025-08-03 RX ORDER — BISACODYL 5 MG
10 TABLET, DELAYED RELEASE (ENTERIC COATED) ORAL ONCE
Status: COMPLETED | OUTPATIENT
Start: 2025-08-03 | End: 2025-08-03

## 2025-08-03 RX ORDER — FERROUS SULFATE 325(65) MG
65 TABLET ORAL
Status: DISCONTINUED | OUTPATIENT
Start: 2025-08-04 | End: 2025-08-05 | Stop reason: HOSPADM

## 2025-08-03 RX ORDER — POTASSIUM CHLORIDE 14.9 MG/ML
20 INJECTION INTRAVENOUS
Status: COMPLETED | OUTPATIENT
Start: 2025-08-03 | End: 2025-08-03

## 2025-08-03 RX ORDER — ACETAMINOPHEN 500 MG
1000 TABLET ORAL DAILY PRN
Status: ON HOLD | COMMUNITY
End: 2025-08-03 | Stop reason: ENTERED-IN-ERROR

## 2025-08-03 RX ORDER — PANTOPRAZOLE SODIUM 40 MG/10ML
40 INJECTION, POWDER, LYOPHILIZED, FOR SOLUTION INTRAVENOUS 2 TIMES DAILY
Status: DISCONTINUED | OUTPATIENT
Start: 2025-08-03 | End: 2025-08-04

## 2025-08-03 RX ORDER — PANTOPRAZOLE SODIUM 40 MG/10ML
80 INJECTION, POWDER, LYOPHILIZED, FOR SOLUTION INTRAVENOUS ONCE
Status: COMPLETED | OUTPATIENT
Start: 2025-08-03 | End: 2025-08-03

## 2025-08-03 RX ORDER — AMIODARONE HYDROCHLORIDE 200 MG/1
200 TABLET ORAL DAILY
Status: DISCONTINUED | OUTPATIENT
Start: 2025-08-03 | End: 2025-08-05 | Stop reason: HOSPADM

## 2025-08-03 RX ORDER — POTASSIUM CHLORIDE 20 MEQ/1
20 TABLET, EXTENDED RELEASE ORAL ONCE
Status: COMPLETED | OUTPATIENT
Start: 2025-08-03 | End: 2025-08-03

## 2025-08-03 RX ORDER — METHIMAZOLE 5 MG/1
5 TABLET ORAL DAILY
Status: DISCONTINUED | OUTPATIENT
Start: 2025-08-03 | End: 2025-08-05 | Stop reason: HOSPADM

## 2025-08-03 RX ORDER — ACETAMINOPHEN 325 MG/1
650 TABLET ORAL EVERY 4 HOURS PRN
Status: DISCONTINUED | OUTPATIENT
Start: 2025-08-03 | End: 2025-08-04

## 2025-08-03 RX ORDER — ATORVASTATIN CALCIUM 40 MG/1
40 TABLET, FILM COATED ORAL NIGHTLY
Status: DISCONTINUED | OUTPATIENT
Start: 2025-08-03 | End: 2025-08-05 | Stop reason: HOSPADM

## 2025-08-03 RX ADMIN — ATORVASTATIN CALCIUM 40 MG: 40 TABLET, FILM COATED ORAL at 20:00

## 2025-08-03 RX ADMIN — METHIMAZOLE 5 MG: 5 TABLET ORAL at 12:45

## 2025-08-03 RX ADMIN — TETANUS TOXOID, REDUCED DIPHTHERIA TOXOID AND ACELLULAR PERTUSSIS VACCINE, ADSORBED 0.5 ML: 5; 2.5; 8; 8; 2.5 SUSPENSION INTRAMUSCULAR at 02:10

## 2025-08-03 RX ADMIN — POTASSIUM CHLORIDE 20 MEQ: 200 INJECTION, SOLUTION INTRAVENOUS at 05:04

## 2025-08-03 RX ADMIN — SODIUM CHLORIDE 1000 ML: 9 INJECTION, SOLUTION INTRAVENOUS at 02:10

## 2025-08-03 RX ADMIN — PANTOPRAZOLE SODIUM 40 MG: 40 INJECTION, POWDER, FOR SOLUTION INTRAVENOUS at 20:00

## 2025-08-03 RX ADMIN — BISACODYL 10 MG: 5 TABLET, COATED ORAL at 17:51

## 2025-08-03 RX ADMIN — PANTOPRAZOLE SODIUM 40 MG: 40 INJECTION, POWDER, FOR SOLUTION INTRAVENOUS at 11:37

## 2025-08-03 RX ADMIN — POTASSIUM CHLORIDE EXTENDED-RELEASE 20 MEQ: 1500 TABLET ORAL at 12:45

## 2025-08-03 RX ADMIN — METHIMAZOLE 5 MG: 5 TABLET ORAL at 12:42

## 2025-08-03 RX ADMIN — PANTOPRAZOLE SODIUM 80 MG: 40 INJECTION, POWDER, FOR SOLUTION INTRAVENOUS at 02:10

## 2025-08-03 RX ADMIN — POTASSIUM CHLORIDE 20 MEQ: 200 INJECTION, SOLUTION INTRAVENOUS at 02:10

## 2025-08-03 RX ADMIN — IOHEXOL 75 ML: 350 INJECTION, SOLUTION INTRAVENOUS at 01:25

## 2025-08-03 RX ADMIN — AMIODARONE HYDROCHLORIDE 200 MG: 200 TABLET ORAL at 11:37

## 2025-08-03 SDOH — ECONOMIC STABILITY: HOUSING INSECURITY: IN THE PAST 12 MONTHS, HOW MANY TIMES HAVE YOU MOVED WHERE YOU WERE LIVING?: 0

## 2025-08-03 SDOH — ECONOMIC STABILITY: FOOD INSECURITY: WITHIN THE PAST 12 MONTHS, YOU WORRIED THAT YOUR FOOD WOULD RUN OUT BEFORE YOU GOT THE MONEY TO BUY MORE.: NEVER TRUE

## 2025-08-03 SDOH — SOCIAL STABILITY: SOCIAL INSECURITY: DO YOU FEEL ANYONE HAS EXPLOITED OR TAKEN ADVANTAGE OF YOU FINANCIALLY OR OF YOUR PERSONAL PROPERTY?: NO

## 2025-08-03 SDOH — ECONOMIC STABILITY: HOUSING INSECURITY: IN THE LAST 12 MONTHS, WAS THERE A TIME WHEN YOU WERE NOT ABLE TO PAY THE MORTGAGE OR RENT ON TIME?: NO

## 2025-08-03 SDOH — ECONOMIC STABILITY: INCOME INSECURITY: IN THE PAST 12 MONTHS HAS THE ELECTRIC, GAS, OIL, OR WATER COMPANY THREATENED TO SHUT OFF SERVICES IN YOUR HOME?: NO

## 2025-08-03 SDOH — ECONOMIC STABILITY: HOUSING INSECURITY: AT ANY TIME IN THE PAST 12 MONTHS, WERE YOU HOMELESS OR LIVING IN A SHELTER (INCLUDING NOW)?: NO

## 2025-08-03 SDOH — ECONOMIC STABILITY: FOOD INSECURITY: WITHIN THE PAST 12 MONTHS, THE FOOD YOU BOUGHT JUST DIDN'T LAST AND YOU DIDN'T HAVE MONEY TO GET MORE.: NEVER TRUE

## 2025-08-03 SDOH — SOCIAL STABILITY: SOCIAL INSECURITY: WITHIN THE LAST YEAR, HAVE YOU BEEN AFRAID OF YOUR PARTNER OR EX-PARTNER?: NO

## 2025-08-03 SDOH — SOCIAL STABILITY: SOCIAL INSECURITY: DOES ANYONE TRY TO KEEP YOU FROM HAVING/CONTACTING OTHER FRIENDS OR DOING THINGS OUTSIDE YOUR HOME?: NO

## 2025-08-03 SDOH — ECONOMIC STABILITY: FOOD INSECURITY: HOW HARD IS IT FOR YOU TO PAY FOR THE VERY BASICS LIKE FOOD, HOUSING, MEDICAL CARE, AND HEATING?: NOT HARD AT ALL

## 2025-08-03 SDOH — SOCIAL STABILITY: SOCIAL INSECURITY: DO YOU FEEL UNSAFE GOING BACK TO THE PLACE WHERE YOU ARE LIVING?: NO

## 2025-08-03 SDOH — SOCIAL STABILITY: SOCIAL INSECURITY: WITHIN THE LAST YEAR, HAVE YOU BEEN HUMILIATED OR EMOTIONALLY ABUSED IN OTHER WAYS BY YOUR PARTNER OR EX-PARTNER?: NO

## 2025-08-03 SDOH — SOCIAL STABILITY: SOCIAL INSECURITY: HAVE YOU HAD THOUGHTS OF HARMING ANYONE ELSE?: NO

## 2025-08-03 SDOH — SOCIAL STABILITY: SOCIAL INSECURITY: HAS ANYONE EVER THREATENED TO HURT YOUR FAMILY OR YOUR PETS?: NO

## 2025-08-03 SDOH — SOCIAL STABILITY: SOCIAL INSECURITY: ARE YOU OR HAVE YOU BEEN THREATENED OR ABUSED PHYSICALLY, EMOTIONALLY, OR SEXUALLY BY ANYONE?: NO

## 2025-08-03 SDOH — SOCIAL STABILITY: SOCIAL INSECURITY: HAVE YOU HAD ANY THOUGHTS OF HARMING ANYONE ELSE?: NO

## 2025-08-03 SDOH — ECONOMIC STABILITY: TRANSPORTATION INSECURITY: IN THE PAST 12 MONTHS, HAS LACK OF TRANSPORTATION KEPT YOU FROM MEDICAL APPOINTMENTS OR FROM GETTING MEDICATIONS?: NO

## 2025-08-03 SDOH — SOCIAL STABILITY: SOCIAL INSECURITY: ARE THERE ANY APPARENT SIGNS OF INJURIES/BEHAVIORS THAT COULD BE RELATED TO ABUSE/NEGLECT?: NO

## 2025-08-03 SDOH — SOCIAL STABILITY: SOCIAL INSECURITY: ABUSE: ADULT

## 2025-08-03 ASSESSMENT — PAIN SCALES - GENERAL
PAINLEVEL_OUTOF10: 0 - NO PAIN

## 2025-08-03 ASSESSMENT — ACTIVITIES OF DAILY LIVING (ADL)
ADEQUATE_TO_COMPLETE_ADL: YES
FEEDING YOURSELF: INDEPENDENT
WALKS IN HOME: INDEPENDENT
HEARING - LEFT EAR: FUNCTIONAL
DRESSING YOURSELF: INDEPENDENT
GROOMING: INDEPENDENT
BATHING: INDEPENDENT
HEARING - RIGHT EAR: FUNCTIONAL
LACK_OF_TRANSPORTATION: NO
JUDGMENT_ADEQUATE_SAFELY_COMPLETE_DAILY_ACTIVITIES: YES
PATIENT'S MEMORY ADEQUATE TO SAFELY COMPLETE DAILY ACTIVITIES?: YES
TOILETING: INDEPENDENT

## 2025-08-03 ASSESSMENT — LIFESTYLE VARIABLES
HOW OFTEN DO YOU HAVE A DRINK CONTAINING ALCOHOL: 2-3 TIMES A WEEK
HOW OFTEN DO YOU HAVE 6 OR MORE DRINKS ON ONE OCCASION: NEVER
AUDIT-C TOTAL SCORE: 3
SKIP TO QUESTIONS 9-10: 1
AUDIT-C TOTAL SCORE: 3
HOW MANY STANDARD DRINKS CONTAINING ALCOHOL DO YOU HAVE ON A TYPICAL DAY: 1 OR 2

## 2025-08-03 ASSESSMENT — COGNITIVE AND FUNCTIONAL STATUS - GENERAL
MOBILITY SCORE: 24
PATIENT BASELINE BEDBOUND: NO
DAILY ACTIVITIY SCORE: 24

## 2025-08-03 ASSESSMENT — PATIENT HEALTH QUESTIONNAIRE - PHQ9
SUM OF ALL RESPONSES TO PHQ9 QUESTIONS 1 & 2: 0
1. LITTLE INTEREST OR PLEASURE IN DOING THINGS: NOT AT ALL
2. FEELING DOWN, DEPRESSED OR HOPELESS: NOT AT ALL

## 2025-08-03 ASSESSMENT — ENCOUNTER SYMPTOMS: FATIGUE: 1

## 2025-08-03 ASSESSMENT — PAIN - FUNCTIONAL ASSESSMENT: PAIN_FUNCTIONAL_ASSESSMENT: 0-10

## 2025-08-03 NOTE — ED NOTES
2nd unit of PRBC's up and infusing. Will continue to monitor pt.     Obdulia Marmolejo RN  08/03/25 [id number]

## 2025-08-03 NOTE — CARE PLAN
The patient's goals for the shift include      The clinical goals for the shift include pt will remain hemodynamically stable throughout shift      Problem: Pain - Adult  Goal: Verbalizes/displays adequate comfort level or baseline comfort level  Outcome: Progressing     Problem: Safety - Adult  Goal: Free from fall injury  Outcome: Progressing     Problem: Discharge Planning  Goal: Discharge to home or other facility with appropriate resources  Outcome: Progressing     Problem: Chronic Conditions and Co-morbidities  Goal: Patient's chronic conditions and co-morbidity symptoms are monitored and maintained or improved  Outcome: Progressing     Problem: Nutrition  Goal: Nutrient intake appropriate for maintaining nutritional needs  Outcome: Progressing

## 2025-08-03 NOTE — PROGRESS NOTES
Pharmacy Medication History Review    Dianne Clay is a 85 y.o. female admitted for No Principal Problem: There is no principal problem currently on the Problem List. Please update the Problem List and refresh.. Pharmacy reviewed the patient's [id number] medications and allergies for accuracy.    The list below reflectives the updated PTA list. Please review each medication in order reconciliation for additional clarification and justification.  Prior to Admission Medications   Prescriptions Last Dose Informant Patient Reported? Taking?   FeroSuL tablet 8/2/2025 Morning  No Yes   Sig: TAKE 1 TABLET BY MOUTH DAILY WITH FOOD   amiodarone (Pacerone) 200 mg tablet 8/2/2025 Morning  No Yes   Sig: Take 1 tablet by mouth once daily with a meal.   atorvastatin (Lipitor) 40 mg tablet 8/2/2025 Morning  No Yes   Sig: Take 1 tablet (40 mg) by mouth once daily.   hydrocortisone (Anusol-HC) 2.5 % rectal cream Past Week  Yes Yes   Sig: Insert into the rectum twice a day.   methIMAzole (Tapazole) 5 mg tablet 8/2/2025 Morning  No Yes   Sig: TAKE 1 TABLET BY MOUTH ONCE DAILY.   pantoprazole (ProtoNix) 40 mg EC tablet 8/2/2025 Morning  No Yes   Sig: TAKE 1 TABLET BY MOUTH ONCE DAILY      Facility-Administered Medications: None            The list below reflectives the updated allergy list. Please review each documented allergy for additional clarification and justification.  Allergies  Reviewed by Dhruv Davis on 8/3/2025        Severity Reactions Comments    Bee Venom Protein (honey Bee) High Swelling             Below are additional concerns with the patient's PTA list.    DHRUV DAVIS

## 2025-08-03 NOTE — CONSULTS
Reason For Consult  GI Bleeding    History Of Present Illness  Dianne Clay is a 85 y.o. female has a past medical history of recurrent GI bleeds which have been attributed in the past to hemorrhoids with possibility of diverticular bleed.  Her last colonoscopy was on September 6, 2024.  She has also history of paroxysmal atrial fibrillation and was started on Eliquis in March 2025 which had to be stopped couple days later because of GI bleed.  She was advised Watchman procedure which she states she refused.  Her baseline hemoglobin is usually about 11 g.  She has history of CAD and history of PCI in 2003 and had heart catheterization on March 4, 2025 that showed severe stent stenosis of proximal mid RCA with good left-to-right collaterals.  Her last hospitalization was from March 10 to March 12, 2025.  However she was seen recently in the ER on July 16 with rectal bleeding with CT abdomen and pelvis without acute abnormality.  She was discharged at that time on Anusol.  She is now admitted through [facility] ED when she presented because of passing out after straining while having bowel movement and had bright red blood per rectum.  She states she had about 5 episodes of bleeding and she thinks that the last couple bowel movements showed darker blood and clots.  She did feel lightheaded and somewhat short of breath.  She did 2 hit her head but did not suffer any injuries.  She is currently not on any blood thinners but on presentation to the ED at [facility] she was noted to have hypokalemia with potassium 2.9 and hemoglobin of 7.4 which is gradually dropped to 5.9.  She required 2 units of PRBC there.  CT abdomen pelvis C-spine and head did not show acute abnormalities.  Her potassium was also replaced besides giving IV fluids.  She was transferred to [facility] and arrived with the transportation medical staff.  She was able to get off the gurney by herself and went to the bathroom without any dizziness or  lightheadedness.  Her blood pressure has remained stable and has not had any bowel movement since 8 AM.  Her hemoglobin repeat is 9.4 lactic is normal and potassium improved to 3.4 with normal creatinine of 0.5.  She currently has no acute complaints.  She did report having nausea and some abdominal generalized and vague discomfort when she had episodes of bleeding but currently denies any abdominal symptoms.    Past Medical History  She has a past medical history of CAD (coronary artery disease), Diverticular hemorrhage, GIB (gastrointestinal bleeding), Hypertension, Hyperthyroidism, CARIE (iron deficiency anemia), and PAF (paroxysmal atrial fibrillation) (Multi).    Surgical History  She has a past surgical history that includes Colonoscopy;  section, classic; Cataract extraction; Coronary angioplasty with stent (); Flexible sigmoidoscopy; Upper gastrointestinal endoscopy; Cardiac catheterization (N/A, 2025); and Cholecystectomy.     Social History  She reports that she has never smoked. She has never been exposed to tobacco smoke. She has never used smokeless tobacco. She reports current alcohol use of about 2.0 - 6.0 standard drinks of alcohol per week. She reports that she does not use drugs.    Family History  Family History[1]     Allergies  Bee venom protein (honey bee)    Review of Systems  Review of Systems   Constitutional:  Positive for fatigue.   All other systems reviewed and are negative.         Physical Exam  Physical Exam  Vitals and nursing note reviewed.   Constitutional:       Appearance: Normal appearance.   HENT:      Head: Normocephalic.      Nose: Nose normal.      Mouth/Throat:      Pharynx: Oropharynx is clear.     Eyes:      Extraocular Movements: Extraocular movements intact.      Conjunctiva/sclera: Conjunctivae normal.      Pupils: Pupils are equal, round, and reactive to light.       Cardiovascular:      Rate and Rhythm: Normal rate and regular rhythm.      Pulses:  "Normal pulses.      Heart sounds: Normal heart sounds.   Pulmonary:      Effort: Pulmonary effort is normal.      Breath sounds: Normal breath sounds.   Abdominal:      General: Bowel sounds are normal.      Palpations: Abdomen is soft.     Musculoskeletal:         General: Normal range of motion.      Cervical back: Normal range of motion.     Skin:     General: Skin is warm.     Neurological:      General: No focal deficit present.      Mental Status: She is alert and oriented to person, place, and time. Mental status is at baseline.     Psychiatric:         Mood and Affect: Mood normal.         Behavior: Behavior normal.           Last Recorded Vitals  Blood pressure 124/63, pulse 66, temperature 36.1 °C (97 °F), temperature source Temporal, resp. rate (!) 27, height 1.6 m (5' 3\"), weight 50.1 kg (110 lb 7.2 oz), SpO2 100%.    Relevant Results  Results for orders placed or performed during the hospital encounter of 08/03/25 (from the past 24 hours)   CBC   Result Value Ref Range    WBC 7.4 4.4 - 11.3 x10*3/uL    nRBC 0.0 0.0 - 0.0 /100 WBCs    RBC 3.34 (L) 4.00 - 5.20 x10*6/uL    Hemoglobin 9.4 (L) 12.0 - 16.0 g/dL    Hematocrit 29.0 (L) 36.0 - 46.0 %    MCV 87 80 - 100 fL    MCH 28.1 26.0 - 34.0 pg    MCHC 32.4 32.0 - 36.0 g/dL    RDW 19.3 (H) 11.5 - 14.5 %    Platelets 207 150 - 450 x10*3/uL   Basic metabolic panel   Result Value Ref Range    Glucose 84 74 - 99 mg/dL    Sodium 139 136 - 145 mmol/L    Potassium 3.4 (L) 3.5 - 5.3 mmol/L    Chloride 110 (H) 98 - 107 mmol/L    Bicarbonate 20 (L) 21 - 32 mmol/L    Anion Gap 12 10 - 20 mmol/L    Urea Nitrogen 6 6 - 23 mg/dL    Creatinine 0.52 0.50 - 1.05 mg/dL    eGFR >90 >60 mL/min/1.73m*2    Calcium 7.4 (L) 8.6 - 10.3 mg/dL   Lactate   Result Value Ref Range    Lactate 1.8 0.4 - 2.0 mmol/L     CT angio abdomen pelvis w and or wo IV IV contrast  Result Date: 8/3/2025  STUDY: CT CTA ABDOMEN and PELVIS w + wo CONTRAST; 8/3/2025 1:37 AM INDICATION:  Syncope.  Left " sided abdominal pain with concern for GI bleed. TECHNIQUE:  Helical CT is performed from the aortic diaphragmatic hiatus through the symphysis pubis before and after bolus administration of 75 mL of Omnipaque 350.  Images were reviewed and processed at a workstation according to the CT angiogram protocol with 3-D and/or MIP post processing imaging generated.  Automated mA/kV exposure control was utilized and patient examination was performed in strict accordance with principles of ALARA. COMPARISON:  CTA AP 12/5/2024, 9/5/2024. FINDINGS: Vascular: Mesenteric: The celiac, SMA, and RAN demonstrate no significant stenosis.  Right renal: Single vessel demonstrates no significant stenosis.  Left renal: Single vessel demonstrates no significant stenosis. Abdominal aorta: The abdominal aorta is not aneurysmal and demonstrates no significant occlusive disease. Iliacs: The common, external, and internal iliac vessels demonstrate no aneurysmal disease or significant stenosis. Abdomen: The lung bases are clear. Absent gallbladder.  Mild diffuse biliary dilatation.  No obstructing mass or stone is evident on this exam.  No pancreatic ductal dilatation. The pancreas, spleen, adrenal glands, and kidneys demonstrate no acute pathology. A 1.4 cm left adrenal gland nodule shows no concerning finding and is likely benign. Duplicated bilateral renal collecting systems. There is no free air or lymph node enlargement. Mild gastric wall thickening. Pelvis: A few areas of mild colonic wall thickening, greater in the cecum.  No signs of bowel obstruction..  Scattered diverticula.  There is no free fluid.  Lymph nodes are not enlarged.  Urinary bladder is unremarkable.  Small partially calcified uterine fibroids. Skeleton:  There are no acute fractures.  No suspicious bony lesions.    No acute vascular abnormality.  No evidence of active arterial bleeding. Overall no acute CT findings in the abdomen and pelvis. Mild gastric wall thickening  and a few areas of mild colonic wall thickening nonspecific and similar compared to the prior exam. Mild biliary dilatation is similar compared to prior exams likely reflecting reservoir effect. Signed by Adebayo Landeros MD    CT head wo IV contrast  Result Date: 8/3/2025  Interpreted By:  Sukhdev Carrion, STUDY: CT HEAD WO IV CONTRAST; CT CERVICAL SPINE WO IV CONTRAST;  8/3/2025 1:20 am   INDICATION: Signs/Symptoms:fall; Signs/Symptoms:pain     COMPARISON: None.   ACCESSION NUMBER(S): [id number]; [id number]   ORDERING CLINICIAN: GURINDER BELLA   TECHNIQUE: Axial noncontrast CT images of head with coronal and sagittal reconstructed images. Axial noncontrast CT images of the cervical spine with coronal and sagittal reconstructed images.   FINDINGS: CT HEAD:   BRAIN PARENCHYMA:  No acute intraparenchymal hemorrhage or parenchymal evidence of acute large territory ischemic infarct. Gray-white matter distinction is preserved. No mass-effect.   VENTRICLES and EXTRA-AXIAL SPACES: There is a 1.1 cm x 0.8 cm calcified meningioma along the right tegmen mastoideum, unchanged from prior study. No acute extra-axial or intraventricular hemorrhage. The basal cisterns are patent. The ventricles and sulci are age-concordant.   PARANASAL SINUSES/MASTOIDS:  No hemorrhage or air-fluid levels within the visualized paranasal sinuses. The mastoids are well aerated.   CALVARIUM/ORBITS:  No acute skull fracture.  The orbits and globes are intact to the extent visualized.   EXTRACRANIAL SOFT TISSUES: No discernible acute abnormality.     CT CERVICAL SPINE:   PREVERTEBRAL SOFT TISSUES: Within normal limits.   CRANIOCERVICAL JUNCTION: Intact.   ALIGNMENT:  No traumatic malalignment or traumatic facet widening.   VERTEBRAE:  No acute fracture. Vertebral body heights are maintained.   SPINAL CANAL/INTERVERTEBRAL DISCS: Diffuse moderate disc height loss and disc spur complexes with ligamentum flavum thickening noted. This contributes to  moderate canal stenosis at C3-C4 and otherwise no greater than mild canal stenosis at the remaining levels.   NEURAL FORAMINA:  Multilevel uncovertebral joint and facet arthropathy notably contribute to moderate left C3-C4 foraminal stenosis, severe bilateral C4-C5 foraminal stenosis, severe right and moderate left C5-C6 foraminal stenosis, mild right and moderate left C6-7 foraminal stenosis.   OTHER: Multinodular goiter.       CT HEAD: 1. No acute intracranial abnormality or calvarial fracture. 2. Stable calcified meningioma arising from the right tegmen mastoideum measuring 1.1 cm x 0.8 cm.     CT CERVICAL SPINE: 1. No acute fracture or traumatic malalignment of the cervical spine. 2. Spondylotic changes of the cervical spine as detailed above.   MACRO: None.   Signed by: Sukhdev Carrion 8/3/2025 2:04 AM Dictation workstation:   [id number]    CT cervical spine wo IV contrast  Result Date: 8/3/2025  Interpreted By:  Sukhdev Carrion, STUDY: CT HEAD WO IV CONTRAST; CT CERVICAL SPINE WO IV CONTRAST;  8/3/2025 1:20 am   INDICATION: Signs/Symptoms:fall; Signs/Symptoms:pain     COMPARISON: None.   ACCESSION NUMBER(S): [id number]; [id number]   ORDERING CLINICIAN: GURINDER BELLA   TECHNIQUE: Axial noncontrast CT images of head with coronal and sagittal reconstructed images. Axial noncontrast CT images of the cervical spine with coronal and sagittal reconstructed images.   FINDINGS: CT HEAD:   BRAIN PARENCHYMA:  No acute intraparenchymal hemorrhage or parenchymal evidence of acute large territory ischemic infarct. Gray-white matter distinction is preserved. No mass-effect.   VENTRICLES and EXTRA-AXIAL SPACES: There is a 1.1 cm x 0.8 cm calcified meningioma along the right tegmen mastoideum, unchanged from prior study. No acute extra-axial or intraventricular hemorrhage. The basal cisterns are patent. The ventricles and sulci are age-concordant.   PARANASAL SINUSES/MASTOIDS:  No hemorrhage or air-fluid levels within  the visualized paranasal sinuses. The mastoids are well aerated.   CALVARIUM/ORBITS:  No acute skull fracture.  The orbits and globes are intact to the extent visualized.   EXTRACRANIAL SOFT TISSUES: No discernible acute abnormality.     CT CERVICAL SPINE:   PREVERTEBRAL SOFT TISSUES: Within normal limits.   CRANIOCERVICAL JUNCTION: Intact.   ALIGNMENT:  No traumatic malalignment or traumatic facet widening.   VERTEBRAE:  No acute fracture. Vertebral body heights are maintained.   SPINAL CANAL/INTERVERTEBRAL DISCS: Diffuse moderate disc height loss and disc spur complexes with ligamentum flavum thickening noted. This contributes to moderate canal stenosis at C3-C4 and otherwise no greater than mild canal stenosis at the remaining levels.   NEURAL FORAMINA:  Multilevel uncovertebral joint and facet arthropathy notably contribute to moderate left C3-C4 foraminal stenosis, severe bilateral C4-C5 foraminal stenosis, severe right and moderate left C5-C6 foraminal stenosis, mild right and moderate left C6-7 foraminal stenosis.   OTHER: Multinodular goiter.       CT HEAD: 1. No acute intracranial abnormality or calvarial fracture. 2. Stable calcified meningioma arising from the right tegmen mastoideum measuring 1.1 cm x 0.8 cm.     CT CERVICAL SPINE: 1. No acute fracture or traumatic malalignment of the cervical spine. 2. Spondylotic changes of the cervical spine as detailed above.   MACRO: None.   Signed by: Sukhdev Carrion 8/3/2025 2:04 AM Dictation workstation:   [id number]        Assessment/Plan   Acute GI Bleed, likely Lower GI Bleeding  Acute on chronic blood loss anemia  Hypokalemia  Hypertension  CAD  Paroxysmal atrial fibrillation    Recommendations:  Rectal bleed secondary to hemorrhoids more likely but diverticular bleed not ruled out.     - Monitor in the ICU for any recurrence of GI bleed.  Monitor hemodynamic status and continue cardiac telemetry  - Repeat H&H is better and recommend H&H for follow-up and  if stable Daily CBC  - Transfuse PRBC if Hb <7g  - Continue pantoprazole daily  - NPO for now until seen by GI  - Replete potassium again and repeat BMP in a.m.  - Hold antihypertensives and monitor BP.  Resume antihypertensives if no recurrence of bleeding  - Currently no cardiac issues in terms of heart failure or angina.  - Follow-up with cardiology outpatient for reconsideration for Watchman procedure  - DVT prophylaxis with SCDs  - GI Consult    I spent 32 minutes in the professional and overall critical care of this patient.      Josue Moffett MD         [1]   Family History  Problem Relation Name Age of Onset    Other (cardiac disorder) Mother      Other (cva) Mother      Other (cardiac disorder) Father      Other (cardiac disorder) Sister      Cancer Sister      Other (MI) Sister

## 2025-08-03 NOTE — ED PROVIDER NOTES
History/Exam limitations: none  HPI was provided by patient    HPI:    Chief Complaint   Patient presents with    Syncope    Fall    Rectal Bleeding        Dianne Clay is a 85 y.o. female presents with chief complaint of syncope.  Patient has been having rectal bleeding and was seen and been admitted in the past found to have iron deficiency anemia.  Was on the toilet when she Valsalvaed and had lost consciousness.  Did not sustain any injuries.  Was only out for a brief period of time.  Symptoms not made better or worse by anything.  Not any blood thinning medications.  She did hit her head has a dry scab to the left eyebrow.  States she had 1 episode of blood in her stool earlier today then no blood.  Was told it was secondary to hemorrhoids when she was admitted previously for it.  She is back alert and oriented here.  States she did have pain on the left mid aspect of her abdomen prior to arrival here but had since resolved.        ROS: All other review of systems are negative except as noted above and HPI or ROS.   CONSTITUTIONAL:       fever, chills  EYES:      Blurry vision, change in vision  ENT:       sore throat, congestion, rhinorrhea  CARDIOVASCULAR:       chest pain, palpitations, swelling  RESPIRATORY:       cough, wheeze, shortness of breath  GI:       nausea, vomiting, diarrhea, abdominal pain  GENITOURINARY:       dysuria, hematuria, frequency  MUSCULOSKELETAL:       deformity, neck pain  SKIN:       rash, lesion  NEUROLOGIC:       headache, numbness, focal weakness  ENDOCRINE:      weight loss, fatigue  NOTES: All systems reviewed, negative except as described above       Physical Exam:  GENERAL: Alert, at baseline mentality, cooperative,  in no acute distress.    HEAD: normocephalic, atraumatic    SKIN:  dry skin, no lesions.  Scab present to the left upper eyebrow    EYES: PERRL, EOMs intact,  Conjunctiva pink with no erythema or exudates. No scleral icterus.     ENT: No external deformities.  Nares patent, mucus membranes moist.  Pharynx clear, uvula midline.     NECK: Supple, without meningismus. Trachea at midline. No lymphadenopathy.    PULMONARY: Clear bilaterally. No crackles, rhonchi, wheezing.  No respiratory distress.  No work of breathing.    CARDIAC: Regular rate and regular rhythm.  Pulses 2+ in radials and dorsal pedal pulses bilaterally.  No murmur, rub, gallop.  No edema.    ABDOMEN: Soft, nontender, active bowel sounds.  No palpable organomegaly.  No rebound or guarding.  No CVA tenderness.  No pulsatile masses.    MUSCULOSKELETAL: Full range of motion throughout, no deformity.  No tender to palpation step-offs or deformities down CT or L-spine.  No tender palpation to bilateral lower extremities.    NEUROLOGICAL:  CN II through XII are grossly intact, no focal neuro deficits.  Strength 5 out of 5 throughout bilateral upper and lower extremities neurovascular intact in bilateral upper and lower extremities    PSYCHIATRIC: Appropriate mood and affect. Calm.       MDM/ED COURSE:    The patient presented for evaluation of a fall.  Differential but not limited to intracranial hemorrhage, arrhythmia, electrolyte abnormality, UTI, fracture, anemia,  GI bleed  Imaging studies if performed were independently reviewed and interpreted by myself and confirmed by radiologist.        I discussed the differential, results and plan with the patient and/or family/friend/caregiver if present.        Note: This note was dictated by speech recognition. Minor errors in transcription may be present.    ED Course as of 08/03/25 0537   Sat Aug 02, 2025   2309 EKG interpreted by me shows sinus rhythm with PACs left posterior fascicular block.  No STEMI.  Rate 75.  Compared to previous EKG nonspecific changes have occurred [WL]   2309 HEMOGLOBIN(!): 7.4 [WL]   Sun Aug 03, 2025   0048 Orthostatics were positive.  Was given a bolus of normal saline [WL]   0123 POTASSIUM(!!): 2.9 [WL]   0206 Found to have low  potassium and was given IV replacement.  Given hemoglobin down at 7.4 and she is having syncopal episodes concern for GI bleed.  Was started on Protonix.  We do not have GI coverage here so plan will be for transfer. [WL]   0209 POTASSIUM(!!): 2.9 [WL]   0209 BNP(!): 180 [WL]   0209 HEMOGLOBIN(!): 7.4 [WL]   0210 CT head wo IV contrast [WL]   0210 CT cervical spine wo IV contrast  CT HEAD:  1. No acute intracranial abnormality or calvarial fracture.  2. Stable calcified meningioma arising from the right tegmen  mastoideum measuring 1.1 cm x 0.8 cm.          CT CERVICAL SPINE:  1. No acute fracture or traumatic malalignment of the cervical spine.  2. Spondylotic changes of the cervical spine as detailed above.   [WL]   0216 CT angio abdomen pelvis w and or wo IV IV contrast  No acute vascular abnormality.  No evidence of active arterial  bleeding.  Overall no acute CT findings in the abdomen and pelvis.  Mild gastric wall thickening and a few areas of mild colonic wall  thickening nonspecific and similar compared to the prior exam.  Mild biliary dilatation is similar compared to prior exams likely  reflecting reservoir effect.   [WL]   0245 Spoke with Dr. Javier at [facility] who accepts patient as transfer [WL]   0306 Spoke with Dr. Jeffries hospitalist at [place] who accepts patient as transfer [WL]   0310 Repeat hemoglobin down at 5.9.  She was consented and will transfuse her 2 units. [WL]   0536 [place] hospitalist aware and upgraded patient to ICU at [place]. [WL]      ED Course User Index  [WL] Jyace Mckinney DO         Diagnoses as of 08/03/25 0537   Gastrointestinal hemorrhage, unspecified gastrointestinal hemorrhage type   Anemia, unspecified type   Hypokalemia         Past Medical History  She has a past medical history of Adrenal nodule, Allergic contact dermatitis, unspecified cause (11/07/2015), Anemia, Arthritis, Atrial fibrillation, chronic (Multi), Contusion of unspecified knee, initial encounter  (2018), Diverticular hemorrhage, Effusion, unspecified knee (2019), GERD (gastroesophageal reflux disease), Heart attack, Hypertension, Hyperthyroidism, Noninfective gastroenteritis and colitis, unspecified (2022), Noninfective gastroenteritis and colitis, unspecified, Other conditions influencing health status, PAD (peripheral artery disease), Pain in left knee (2014), Personal history of other diseases of the musculoskeletal system and connective tissue, Pneumonia, Rectal bleeding, Rectal ulcer, and S/P primary angioplasty with coronary stent.    Surgical History  She has a past surgical history that includes Colonoscopy (2014);  section, classic (2014); Cataract extraction (2014); Coronary angioplasty with stent (2014); Other surgical history (2023); Upper gastrointestinal endoscopy; Upper gastrointestinal endoscopy; Flexible sigmoidoscopy; Upper gastrointestinal endoscopy; and Cardiac catheterization (N/A, 3/4/2025).     Social History  She reports that she has never smoked. She has never been exposed to tobacco smoke. She has never used smokeless tobacco. She reports current alcohol use. She reports that she does not use drugs.   Current Outpatient Medications   Medication Instructions    amiodarone (PACERONE) 200 mg, oral, Daily, WITH A MEAL    atorvastatin (LIPITOR) 40 mg, oral, Daily    FeroSuL 325 mg, oral, Daily, Take with food.    hydrocortisone (Anusol-HC) 2.5 % rectal cream 2 times daily    methIMAzole (TAPAZOLE) 5 mg, oral, Daily    pantoprazole (PROTONIX) 40 mg, oral, Daily     RX Allergies[1]          ED Triage Vitals [25 2249]   Temperature Heart Rate Respirations BP   36.3 °C (97.3 °F) 77 18 137/73      Pulse Ox Temp Source Heart Rate Source Patient Position   99 % Temporal Monitor Lying      BP Location FiO2 (%)     Right arm --               Labs and Imaging  CT head wo IV contrast   Final Result   CT HEAD:   1. No acute  intracranial abnormality or calvarial fracture.   2. Stable calcified meningioma arising from the right tegmen   mastoideum measuring 1.1 cm x 0.8 cm.             CT CERVICAL SPINE:   1. No acute fracture or traumatic malalignment of the cervical spine.   2. Spondylotic changes of the cervical spine as detailed above.        MACRO:   None.        Signed by: Sukhdev Carrion 8/3/2025 2:04 AM   Dictation workstation:   [id number]      CT cervical spine wo IV contrast   Final Result   CT HEAD:   1. No acute intracranial abnormality or calvarial fracture.   2. Stable calcified meningioma arising from the right tegmen   mastoideum measuring 1.1 cm x 0.8 cm.             CT CERVICAL SPINE:   1. No acute fracture or traumatic malalignment of the cervical spine.   2. Spondylotic changes of the cervical spine as detailed above.        MACRO:   None.        Signed by: Sukhdev Carrion 8/3/2025 2:04 AM   Dictation workstation:   [id number]      CT angio abdomen pelvis w and or wo IV IV contrast   Final Result   No acute vascular abnormality.  No evidence of active arterial   bleeding.   Overall no acute CT findings in the abdomen and pelvis.   Mild gastric wall thickening and a few areas of mild colonic wall   thickening nonspecific and similar compared to the prior exam.   Mild biliary dilatation is similar compared to prior exams likely   reflecting reservoir effect.   Signed by Adebayo Landeros MD        Labs Reviewed   CBC WITH AUTO DIFFERENTIAL - Abnormal       Result Value    WBC 6.4      nRBC 0.0      RBC 2.70 (*)     Hemoglobin 7.4 (*)     Hematocrit 23.5 (*)     MCV 87      MCH 27.4      MCHC 31.5 (*)     RDW 21.8 (*)     Platelets 266      Neutrophils % 55.2      Immature Granulocytes %, Automated 0.6      Lymphocytes % 34.7      Monocytes % 7.9      Eosinophils % 1.1      Basophils % 0.5      Neutrophils Absolute 3.54      Immature Granulocytes Absolute, Automated 0.04      Lymphocytes Absolute 2.23      Monocytes  Absolute 0.51      Eosinophils Absolute 0.07      Basophils Absolute 0.03     B-TYPE NATRIURETIC PEPTIDE - Abnormal     (*)     Narrative:        <100 pg/mL - Heart failure unlikely  100-299 pg/mL - Intermediate probability of acute heart                  failure exacerbation. Correlate with clinical                  context and patient history.    >=300 pg/mL - Heart Failure likely. Correlate with clinical                  context and patient history.    BNP testing is performed using different testing methodology at [facility] than at other [facility]. Direct result comparisons should only be made within the same method.      COMPREHENSIVE METABOLIC PANEL - Abnormal    Glucose 170 (*)     Sodium 137      Potassium 2.9 (*)     Chloride 104      Bicarbonate 20 (*)     Anion Gap 16      Urea Nitrogen 15      Creatinine 0.85      eGFR 68      Calcium 7.9 (*)     Albumin 2.9 (*)     Alkaline Phosphatase 77      Total Protein 7.6      AST 16      Bilirubin, Total 0.8      ALT 9     PROTIME-INR - Abnormal    Protime 12.5 (*)     INR 1.1     HEMOGLOBIN AND HEMATOCRIT, BLOOD - Abnormal    Hemoglobin 5.9 (*)     Hematocrit 19.4 (*)    APTT - Normal    aPTT 27      Narrative:     The APTT is no longer used for monitoring Unfractionated Heparin Therapy. For monitoring Heparin Therapy, use the Heparin Assay.   SERIAL TROPONIN-INITIAL - Normal    Troponin I, High Sensitivity 9      Narrative:     Less than 99th percentile of normal range cutoff-  Female and children under 18 years old <14 ng/L; Male <21 ng/L: Negative  Repeat testing should be performed if clinically indicated.     Female and children under 18 years old 14-50 ng/L; Male 21-50 ng/L:  Consistent with possible cardiac damage and possible increased clinical   risk. Serial measurements may help to assess extent of myocardial damage.     >50 ng/L: Consistent with cardiac damage, increased clinical risk and  myocardial infarction. Serial  measurements may help assess extent of   myocardial damage.      NOTE: Children less than 1 year old may have higher baseline troponin   levels and results should be interpreted in conjunction with the overall   clinical context.     NOTE: Troponin I testing is performed using a different   testing methodology at [facility] than at other   [facility]. Direct result comparisons should only   be made within the same method.   SERIAL TROPONIN, 1 HOUR - Normal    Troponin I, High Sensitivity 9      Narrative:     Less than 99th percentile of normal range cutoff-  Female and children under 18 years old <14 ng/L; Male <21 ng/L: Negative  Repeat testing should be performed if clinically indicated.     Female and children under 18 years old 14-50 ng/L; Male 21-50 ng/L:  Consistent with possible cardiac damage and possible increased clinical   risk. Serial measurements may help to assess extent of myocardial damage.     >50 ng/L: Consistent with cardiac damage, increased clinical risk and  myocardial infarction. Serial measurements may help assess extent of   myocardial damage.      NOTE: Children less than 1 year old may have higher baseline troponin   levels and results should be interpreted in conjunction with the overall   clinical context.     NOTE: Troponin I testing is performed using a different   testing methodology at [facility] than at other   [facility]. Direct result comparisons should only   be made within the same method.   MAGNESIUM - Normal    Magnesium 1.86     TROPONIN SERIES- (INITIAL, 1 HR)    Narrative:     The following orders were created for panel order Troponin I Series, High Sensitivity (0, 1 HR).  Procedure                               Abnormality         Status                     ---------                               -----------         ------                     Troponin I, High Sensiti...[[id number]]  Normal              Final result                Troponin, High Sensitivi...[[id number]]  Normal              Final result                 Please view results for these tests on the individual orders.   TYPE AND SCREEN    ABO TYPE A      Rh TYPE POS      ANTIBODY SCREEN NEG     URINALYSIS WITH REFLEX CULTURE AND MICROSCOPIC    Narrative:     The following orders were created for panel order Urinalysis with Reflex Culture and Microscopic.  Procedure                               Abnormality         Status                     ---------                               -----------         ------                     Urinalysis with Reflex C...[[id number]]                                                 Extra Urine Gray Tube[[id number]]                                                         Please view results for these tests on the individual orders.   URINALYSIS WITH REFLEX CULTURE AND MICROSCOPIC   EXTRA URINE GRAY TUBE   PREPARE RBC    PRODUCT CODE S9284Z04      Unit Number [id number]      Unit ABO A      Unit RH POS      XM INTEP COMP      Dispense Status IS      Blood Expiration Date 8/7/2025 11:59:00 PM EDT      PRODUCT BLOOD TYPE 6200      UNIT VOLUME 350      PRODUCT CODE L2787T60      Unit Number [id number]      Unit ABO A      Unit RH POS      XM INTEP COMP      Dispense Status XM      Blood Expiration Date 8/8/2025 11:59:00 PM EDT      PRODUCT BLOOD TYPE 6200      UNIT VOLUME 350     MORPHOLOGY    RBC Morphology See Below      RBC Fragments Few      Ovalocytes Few             Procedure  Critical Care    Performed by: Jayce Mckinney DO  Authorized by: Jayce Mckinney DO    Critical care provider statement:     Critical care time (minutes):  30    Critical care time was exclusive of:  Separately billable procedures and treating other patients    Critical care was necessary to treat or prevent imminent or life-threatening deterioration of the following conditions:  Other (use comment box to enter another option) (GIB)    Critical care was time spent  personally by me on the following activities:  Development of treatment plan with patient or surrogate, evaluation of patient's response to treatment, examination of patient, obtaining history from patient or surrogate, ordering and performing treatments and interventions, ordering and review of laboratory studies, ordering and review of radiographic studies, pulse oximetry, re-evaluation of patient's condition and discussions with consultants    Care discussed with: accepting provider at another facility                        Jayce Mckinney DO  08/03/25 0308       [1]   Allergies  Allergen Reactions    Bee Venom Protein (Honey Bee) Swelling        Jayce Mckinney DO  08/03/25 0502

## 2025-08-03 NOTE — ED TRIAGE NOTES
Pt BIB EMS from home for syncopal fall s/p standing from toilet s/p gross bloody BM. H/o blood DE associated to hemorrhoids. Pt remembers feeling faint then waking on floor, she believes, moments later but unsure of time. Unwitnessed fall. Lives home w/ dog. Pt has slight abrasion and edema to left eyebrow. Denies pain in neck or chest or anywhere else. Pt is oriented, speaking in full sentences. Denies thinners. VSS en route. VSS in room.

## 2025-08-03 NOTE — ED NOTES
1st unit infused. No signs or symptoms of transfusion reaction.     Obdulia Marmolejo RN  08/03/25 0751

## 2025-08-03 NOTE — PROGRESS NOTES
Dianne Clay is a 85 y.o. female admitted for GI bleed. Pharmacy reviewed the patient's [id number] medications and allergies for accuracy.    The list below reflects the PTA list prior to pharmacy medication history. A summary a changes to the PTA medication list has been listed below. Please review each medication in order reconciliation for additional clarification and justification.    Source of information:  [employer] rec done today @ [facility]  Medications added:    Medications modified:    Medications to be removed:  Apap 500mg  Medications of concern:      Prior to Admission Medications   Prescriptions Last Dose Informant Patient Reported? Taking?   FeroSuL tablet   No No   Sig: TAKE 1 TABLET BY MOUTH DAILY WITH FOOD   acetaminophen (Tylenol) 500 mg tablet   Yes No   Sig: Take 2 tablets (1,000 mg) by mouth once daily as needed for mild pain (1 - 3).   amiodarone (Pacerone) 200 mg tablet   No No   Sig: Take 1 tablet by mouth once daily with a meal.   atorvastatin (Lipitor) 40 mg tablet   No No   Sig: Take 1 tablet (40 mg) by mouth once daily.   hydrocortisone (Anusol-HC) 2.5 % rectal cream   Yes No   Sig: Insert into the rectum twice a day.   methIMAzole (Tapazole) 5 mg tablet   No No   Sig: TAKE 1 TABLET BY MOUTH ONCE DAILY.   pantoprazole (ProtoNix) 40 mg EC tablet   No No   Sig: TAKE 1 TABLET BY MOUTH ONCE DAILY      Facility-Administered Medications: None       Esther Goldman

## 2025-08-03 NOTE — H&P (VIEW-ONLY)
Reason For Consult  GI bleed    History Of Present Illness  Dianne Clay is a 85 y.o. female presenting with a history of coronary artery disease status post PCI , hypertension, paroxysmal atrial fibrillation previously on anticoagulation with a history of recurrent GI bleeds.  Colonoscopy 2024 for a lower GI bleed was compromised by poor bowel preparation.  With the amount of old stool blood and clots a bleeding source was not identified.  It is unclear if this was hemorrhoidal bleeding or diverticular bleeding.  She is no longer on antithrombotic therapy.  Last night the patient presented to  GI [facility] emergency room after having a syncopal episode and a fall after she had a bloody bowel movement and was straining.  She denies any abdominal cramping.  No nausea vomiting.  No hematemesis.  No melena.  She had approximately 4 or 5 episodes of red bleeding from the rectum..     Past Medical History  She has a past medical history of CAD (coronary artery disease), Diverticular hemorrhage, GIB (gastrointestinal bleeding), Hypertension, Hyperthyroidism, CARIE (iron deficiency anemia), and PAF (paroxysmal atrial fibrillation) (Multi).    Surgical History  She has a past surgical history that includes Colonoscopy;  section, classic; Cataract extraction; Coronary angioplasty with stent (); Flexible sigmoidoscopy; Upper gastrointestinal endoscopy; Cardiac catheterization (N/A, 2025); and Cholecystectomy.     Social History  She reports that she has never smoked. She has never been exposed to tobacco smoke. She has never used smokeless tobacco. She reports current alcohol use of about 2.0 - 6.0 standard drinks of alcohol per week. She reports that she does not use drugs.    Family History  Family History[1]     Allergies  Bee venom protein (honey bee)    Review of Systems  As per the HPI     Physical Exam  Physical Exam:  Pleasant frail elderly female lying in bed in no apparent distress  Alert and  "oriented x 3   HEENT: Normocephalic atraumatic.  Extraocular movements intact.  No scleral icterus.  Oropharynx slightly dry.  Clear no exudate.  Neck: Supple, full range of movement  CV: RRR, no murmurs appreciated  Lungs: CTA bilaterally  Abd: soft, benign, normal active bowel sounds, NT, ND   Ext: no lower extremity edema, cyanosis or clubbing  Skin: No jaundice or rashes       Last Recorded Vitals  Blood pressure 117/58, pulse 60, temperature 36 °C (96.8 °F), temperature source Temporal, resp. rate 14, height 1.6 m (5' 3\"), weight 50.1 kg (110 lb 7.2 oz), SpO2 99%.    Relevant Results  Results for orders placed or performed during the hospital encounter of 08/03/25 (from the past 24 hours)   CBC   Result Value Ref Range    WBC 7.4 4.4 - 11.3 x10*3/uL    nRBC 0.0 0.0 - 0.0 /100 WBCs    RBC 3.34 (L) 4.00 - 5.20 x10*6/uL    Hemoglobin 9.4 (L) 12.0 - 16.0 g/dL    Hematocrit 29.0 (L) 36.0 - 46.0 %    MCV 87 80 - 100 fL    MCH 28.1 26.0 - 34.0 pg    MCHC 32.4 32.0 - 36.0 g/dL    RDW 19.3 (H) 11.5 - 14.5 %    Platelets 207 150 - 450 x10*3/uL   Basic metabolic panel   Result Value Ref Range    Glucose 84 74 - 99 mg/dL    Sodium 139 136 - 145 mmol/L    Potassium 3.4 (L) 3.5 - 5.3 mmol/L    Chloride 110 (H) 98 - 107 mmol/L    Bicarbonate 20 (L) 21 - 32 mmol/L    Anion Gap 12 10 - 20 mmol/L    Urea Nitrogen 6 6 - 23 mg/dL    Creatinine 0.52 0.50 - 1.05 mg/dL    eGFR >90 >60 mL/min/1.73m*2    Calcium 7.4 (L) 8.6 - 10.3 mg/dL   Lactate   Result Value Ref Range    Lactate 1.8 0.4 - 2.0 mmol/L         Assessment/Plan     Hematochezia with acute blood loss anemia in this 85-year-old female with a history of lower GI bleeding.  The bleeding has been clinically significant for a syncopal episode with fall and injury, her hemoglobin dropped to 7.4 from a baseline of about 11 and gradually to 5.9.  She has received 2 units of packed red blood cells in the ICU and hemoglobin is now 9.4.  No further bloody bowel movements this shift " but she is needing to go to the commode at present.  She remains hemodynamically stable.  Benefits risks alternatives to monitored anesthesia care and colonoscopy were discussed in detail with the patient and informed consent obtained.  We will give her a GoLytely bowel preparation tonight and clear liquids today, n.p.o. after midnight except for clears, meds with sips, and bowel preparation.    Thank you for this consultation.    I spent 30 minutes in the professional and overall care of this patient.      Roel Javier MD         [1]   Family History  Problem Relation Name Age of Onset    Other (cardiac disorder) Mother      Other (cva) Mother      Other (cardiac disorder) Father      Other (cardiac disorder) Sister      Cancer Sister      Other (MI) Sister

## 2025-08-03 NOTE — CONSULTS
Reason For Consult  GI bleed    History Of Present Illness  Dianne Clay is a 85 y.o. female presenting with a history of coronary artery disease status post PCI , hypertension, paroxysmal atrial fibrillation previously on anticoagulation with a history of recurrent GI bleeds.  Colonoscopy 2024 for a lower GI bleed was compromised by poor bowel preparation.  With the amount of old stool blood and clots a bleeding source was not identified.  It is unclear if this was hemorrhoidal bleeding or diverticular bleeding.  She is no longer on antithrombotic therapy.  Last night the patient presented to  [facility] emergency room after having a syncopal episode and a fall after she had a bloody bowel movement and was straining.  She denies any abdominal cramping.  No nausea vomiting.  No hematemesis.  No melena.  She had approximately 4 or 5 episodes of red bleeding from the rectum..     Past Medical History  She has a past medical history of CAD (coronary artery disease), Diverticular hemorrhage, GIB (gastrointestinal bleeding), Hypertension, Hyperthyroidism, CARIE (iron deficiency anemia), and PAF (paroxysmal atrial fibrillation) (Multi).    Surgical History  She has a past surgical history that includes Colonoscopy;  section, classic; Cataract extraction; Coronary angioplasty with stent (); Flexible sigmoidoscopy; Upper gastrointestinal endoscopy; Cardiac catheterization (N/A, 2025); and Cholecystectomy.     Social History  She reports that she has never smoked. She has never been exposed to tobacco smoke. She has never used smokeless tobacco. She reports current alcohol use of about 2.0 - 6.0 standard drinks of alcohol per week. She reports that she does not use drugs.    Family History  Family History[1]     Allergies  Bee venom protein (honey bee)    Review of Systems  As per the HPI     Physical Exam  Physical Exam:  Pleasant frail elderly female lying in bed in no apparent distress  Alert and  "oriented x 3   HEENT: Normocephalic atraumatic.  Extraocular movements intact.  No scleral icterus.  Oropharynx slightly dry.  Clear no exudate.  Neck: Supple, full range of movement  CV: RRR, no murmurs appreciated  Lungs: CTA bilaterally  Abd: soft, benign, normal active bowel sounds, NT, ND   Ext: no lower extremity edema, cyanosis or clubbing  Skin: No jaundice or rashes       Last Recorded Vitals  Blood pressure 117/58, pulse 60, temperature 36 °C (96.8 °F), temperature source Temporal, resp. rate 14, height 1.6 m (5' 3\"), weight 50.1 kg (110 lb 7.2 oz), SpO2 99%.    Relevant Results  Results for orders placed or performed during the hospital encounter of 08/03/25 (from the past 24 hours)   CBC   Result Value Ref Range    WBC 7.4 4.4 - 11.3 x10*3/uL    nRBC 0.0 0.0 - 0.0 /100 WBCs    RBC 3.34 (L) 4.00 - 5.20 x10*6/uL    Hemoglobin 9.4 (L) 12.0 - 16.0 g/dL    Hematocrit 29.0 (L) 36.0 - 46.0 %    MCV 87 80 - 100 fL    MCH 28.1 26.0 - 34.0 pg    MCHC 32.4 32.0 - 36.0 g/dL    RDW 19.3 (H) 11.5 - 14.5 %    Platelets 207 150 - 450 x10*3/uL   Basic metabolic panel   Result Value Ref Range    Glucose 84 74 - 99 mg/dL    Sodium 139 136 - 145 mmol/L    Potassium 3.4 (L) 3.5 - 5.3 mmol/L    Chloride 110 (H) 98 - 107 mmol/L    Bicarbonate 20 (L) 21 - 32 mmol/L    Anion Gap 12 10 - 20 mmol/L    Urea Nitrogen 6 6 - 23 mg/dL    Creatinine 0.52 0.50 - 1.05 mg/dL    eGFR >90 >60 mL/min/1.73m*2    Calcium 7.4 (L) 8.6 - 10.3 mg/dL   Lactate   Result Value Ref Range    Lactate 1.8 0.4 - 2.0 mmol/L         Assessment/Plan     Hematochezia with acute blood loss anemia in this 85-year-old female with a history of lower GI bleeding.  The bleeding has been clinically significant for a syncopal episode with fall and injury, her hemoglobin dropped to 7.4 from a baseline of about 11 and gradually to 5.9.  She has received 2 units of packed red blood cells in the ICU and hemoglobin is now 9.4.  No further bloody bowel movements this shift " but she is needing to go to the commode at present.  She remains hemodynamically stable.  Benefits risks alternatives to monitored anesthesia care and colonoscopy were discussed in detail with the patient and informed consent obtained.  We will give her a GoLytely bowel preparation tonight and clear liquids today, n.p.o. after midnight except for clears, meds with sips, and bowel preparation.    Thank you for this consultation.    I spent 30 minutes in the professional and overall care of this patient.      Roel Javier MD         [1]   Family History  Problem Relation Name Age of Onset    Other (cardiac disorder) Mother      Other (cva) Mother      Other (cardiac disorder) Father      Other (cardiac disorder) Sister      Cancer Sister      Other (MI) Sister

## 2025-08-03 NOTE — H&P
[facility] MEDICINE HISTORY AND PHYSICAL    Subjective     Dianne Clay is an 85 y.o. female with PMHx of CAD s/p PCI 2003, recurrent GI bleeds, HTN, PAF and anemia who presented yesterday to [facility] ED with an unwitnessed syncopal fall from standing up after she had a bloody BM and was straining. She had a BM at 0800 yesterday and noted light red blood. Over the course of the next 8-9 hours she had 4 more episodes of blood BMs. She noted darkening blood and some dark as well as light-colored stool. The last time she remembered feeling faint then waking on floor, she believes, moments later but unsure of time. She did strike her head and sustained a slight abrasion and edema to left eyebrow. She was admitted for GIB in March and apixaban was stopped (was just started 2 days earlier). Since then she had one episode of BRBPR and went to [facility] ED on 7/16/25. Her Hgb was 11.2 and she was discharged with Anusol for hemorrhoids. Remainder of ROS reviewed and negative except as indicated in HPI.     Objective       ED workup was significant for blood glucose 170, potassium 2.9, troponins 9/9, Hgb 7.4/5.9. UA was not suggestive of possible infection. CTA abdomen/pelvis, c-spine and head revealed no acute changes. The pt was hypertensive to 178/66 mmHg. The pt was transfused 2U PRC, She was also given potassium 40 mEq IV, Protonix 80 mg IV and 1L NS bolus PTA to [place].     Visit Vitals  LMP  (LMP Unknown)   OB Status Postmenopausal   Smoking Status Never       There were no vitals filed for this visit.    Scheduled medications  Scheduled Medications[1]  Continuous medications  Continuous Medications[2]  PRN medications  PRN Medications[3]    The following labwork was reviewed:  Results for orders placed or performed during the hospital encounter of 08/02/25 (from the past 24 hours)   CBC and Auto Differential   Result Value Ref Range    WBC 6.4 4.4 - 11.3 x10*3/uL    nRBC 0.0 0.0 - 0.0 /100 WBCs    RBC 2.70 (L)  4.00 - 5.20 x10*6/uL    Hemoglobin 7.4 (L) 12.0 - 16.0 g/dL    Hematocrit 23.5 (L) 36.0 - 46.0 %    MCV 87 80 - 100 fL    MCH 27.4 26.0 - 34.0 pg    MCHC 31.5 (L) 32.0 - 36.0 g/dL    RDW 21.8 (H) 11.5 - 14.5 %    Platelets 266 150 - 450 x10*3/uL    Neutrophils % 55.2 40.0 - 80.0 %    Immature Granulocytes %, Automated 0.6 0.0 - 0.9 %    Lymphocytes % 34.7 13.0 - 44.0 %    Monocytes % 7.9 2.0 - 10.0 %    Eosinophils % 1.1 0.0 - 6.0 %    Basophils % 0.5 0.0 - 2.0 %    Neutrophils Absolute 3.54 1.60 - 5.50 x10*3/uL    Immature Granulocytes Absolute, Automated 0.04 0.00 - 0.50 x10*3/uL    Lymphocytes Absolute 2.23 0.80 - 3.00 x10*3/uL    Monocytes Absolute 0.51 0.05 - 0.80 x10*3/uL    Eosinophils Absolute 0.07 0.00 - 0.40 x10*3/uL    Basophils Absolute 0.03 0.00 - 0.10 x10*3/uL   B-Type Natriuretic Peptide   Result Value Ref Range     (H) 0 - 99 pg/mL   Comprehensive metabolic panel   Result Value Ref Range    Glucose 170 (H) 74 - 99 mg/dL    Sodium 137 136 - 145 mmol/L    Potassium 2.9 (LL) 3.5 - 5.3 mmol/L    Chloride 104 98 - 107 mmol/L    Bicarbonate 20 (L) 21 - 32 mmol/L    Anion Gap 16 10 - 20 mmol/L    Urea Nitrogen 15 6 - 23 mg/dL    Creatinine 0.85 0.50 - 1.05 mg/dL    eGFR 68 >60 mL/min/1.73m*2    Calcium 7.9 (L) 8.6 - 10.3 mg/dL    Albumin 2.9 (L) 3.4 - 5.0 g/dL    Alkaline Phosphatase 77 33 - 136 U/L    Total Protein 7.6 6.4 - 8.2 g/dL    AST 16 9 - 39 U/L    Bilirubin, Total 0.8 0.0 - 1.2 mg/dL    ALT 9 7 - 45 U/L   APTT   Result Value Ref Range    aPTT 27 26 - 36 seconds   Protime-INR   Result Value Ref Range    Protime 12.5 (H) 9.8 - 12.4 seconds    INR 1.1 0.9 - 1.1   Troponin I, High Sensitivity, Initial   Result Value Ref Range    Troponin I, High Sensitivity 9 0 - 13 ng/L   Morphology   Result Value Ref Range    RBC Morphology See Below     RBC Fragments Few     Ovalocytes Few    Troponin, High Sensitivity, 1 Hour   Result Value Ref Range    Troponin I, High Sensitivity 9 0 - 13 ng/L    Magnesium   Result Value Ref Range    Magnesium 1.86 1.60 - 2.40 mg/dL   Hemoglobin and hematocrit, blood   Result Value Ref Range    Hemoglobin 5.9 (LL) 12.0 - 16.0 g/dL    Hematocrit 19.4 (L) 36.0 - 46.0 %   Prepare RBC: 2 Units   Result Value Ref Range    PRODUCT CODE Z0039S68     Unit Number [id number]     Unit ABO A     Unit RH POS     XM INTEP COMP     Dispense Status TR     Blood Expiration Date 8/7/2025 11:59:00 PM EDT     PRODUCT BLOOD TYPE 6200     UNIT VOLUME 350     PRODUCT CODE Z5996T04     Unit Number [id number]     Unit ABO A     Unit RH POS     XM INTEP COMP     Dispense Status IS     Blood Expiration Date 8/8/2025 11:59:00 PM EDT     PRODUCT BLOOD TYPE 6200     UNIT VOLUME 350    Type and screen   Result Value Ref Range    ABO TYPE A     Rh TYPE POS     ANTIBODY SCREEN NEG    Urinalysis with Reflex Culture and Microscopic   Result Value Ref Range    Color, Urine Colorless (N) Light-Yellow, Yellow, Dark-Yellow    Appearance, Urine Clear Clear    Specific Gravity, Urine 1.023 1.005 - 1.035    pH, Urine 5.5 5.0, 5.5, 6.0, 6.5, 7.0, 7.5, 8.0    Protein, Urine NEGATIVE NEGATIVE, 10 (TRACE), 20 (TRACE) mg/dL    Glucose, Urine Normal Normal mg/dL    Blood, Urine NEGATIVE NEGATIVE mg/dL    Ketones, Urine NEGATIVE NEGATIVE mg/dL    Bilirubin, Urine NEGATIVE NEGATIVE mg/dL    Urobilinogen, Urine Normal Normal mg/dL    Nitrite, Urine NEGATIVE NEGATIVE    Leukocyte Esterase, Urine NEGATIVE NEGATIVE       The following imaging was reviewed:  CT angio abdomen pelvis w and or wo IV IV contrast  Result Date: 8/3/2025  No acute vascular abnormality.  No evidence of active arterial bleeding. Overall no acute CT findings in the abdomen and pelvis. Mild gastric wall thickening and a few areas of mild colonic wall thickening nonspecific and similar compared to the prior exam. Mild biliary dilatation is similar compared to prior exams likely reflecting reservoir effect. Signed by Adebayo Landeros MD    CT head  wo IV contrast  Result Date: 8/3/2025  CT HEAD: 1. No acute intracranial abnormality or calvarial fracture. 2. Stable calcified meningioma arising from the right tegmen mastoideum measuring 1.1 cm x 0.8 cm.     CT CERVICAL SPINE: 1. No acute fracture or traumatic malalignment of the cervical spine. 2. Spondylotic changes of the cervical spine as detailed above.   MACRO: None.   Signed by: Sukhdev Carrion 8/3/2025 2:04 AM Dictation workstation:   [id number]    CT cervical spine wo IV contrast  Result Date: 8/3/2025  CT HEAD: 1. No acute intracranial abnormality or calvarial fracture. 2. Stable calcified meningioma arising from the right tegmen mastoideum measuring 1.1 cm x 0.8 cm.     CT CERVICAL SPINE: 1. No acute fracture or traumatic malalignment of the cervical spine. 2. Spondylotic changes of the cervical spine as detailed above.   MACRO: None.   Signed by: Sukhdev Carrion 8/3/2025 2:04 AM Dictation workstation:   [id number]      Medical History[4]    Surgical History[5]    Social History[6]    Family History[7]    Allergies  Bee venom protein (honey bee)    Constitutional: Thin, awake, alert, calm, oriented x4, no acute distress, cooperative   Eyes: EOMI, clear sclerae   ENMT: mucous membranes moist, no lesions seen   Head/Neck: Neck supple, no apparent injury, head atraumatic   Respiratory/Thorax: CTAB, good chest expansion, respirations even and unlabored   Cardiovascular: Regular rate and rhythm, no murmurs/rubs/gallops, normal S1 and S 2   Gastrointestinal: Abdomen nondistended, soft, nontender, +BS, no bruits   Musculoskeletal: ROM intact, no joint swelling, normal  strength   Extremities: no cyanosis, contusions or clubbing, or edema   Neurological: no focal deficit, pt alert and oriented x4   Psychological: Appropriate affect and behavior, pleasant   Skin: Warm and dry, no lesions, no rashes       Assessment/Plan     Recurrent GI bleed  Acute on chronic anemia  Multiple prior admissions for  GI/diverticular bleeding in 2024, 2023, 2022 and 2014  Colonoscopy Sept 2024 revealed red blood and clots and extensive diverticulosis in the entire colon but no active bleeding, and no culprit lesion was identified    GI recommended no intervention during last admission in March  Baseline Hgb is 9-10  Consult GI, CLD for now, continue IV Protonix    CAD  Pt is s/p PCI in 2003 and underwent L&R heart cath 3/4/25 without intervention  Continue Lipitor, pt is no longer on atenolol    PAF  Pt was started on apixaban in March which was stopped after an admission two days later for GIB  She was advised to follow up with her cardiologist Dr. Queen for consideration of Watchman device but she cancelled the procedure after having doubts  I advised her to notify her cardiologist (which she says she hasn't) and strongly urged her to d/w him and reconsider the procedure as it mitigates her risk for VTE in setting of recurrent GIB    HTN, uncontrolled  Monitor BP on home meds    DVT ppx: SCDs given acute anemia/GIB    Discharge disposition  Discharge when medically stable       Deana Mason CNP  [facility] Medicine    The pt's case and plan of care was discussed with the ED provider. The ED notes were reviewed in detail, as were prior outpatient and patient notes as part of the admission chart review. The pt is at high risk for cardiovascular events including MI due to acute anemia and GIB. The decision was made to escalate care and admit the pt under ICU status.              [1] [2] [3] [4]   Past Medical History:  Diagnosis Date    CAD (coronary artery disease)     Diverticular hemorrhage     GIB (gastrointestinal bleeding)     recurrent    Hypertension     Hyperthyroidism     CARIE (iron deficiency anemia)     PAF (paroxysmal atrial fibrillation) (Multi)    [5]   Past Surgical History:  Procedure Laterality Date    CARDIAC CATHETERIZATION N/A 03/04/2025    Procedure: Left & Right Heart Cath w Angiography & LV;   Surgeon: Flavio Queen MD;  Location: [facility] Cardiac Cath Lab;  Service: Cardiovascular;  Laterality: N/A;    CATARACT EXTRACTION       SECTION, CLASSIC      CHOLECYSTECTOMY      COLONOSCOPY      CORONARY ANGIOPLASTY WITH STENT PLACEMENT      FLEXIBLE SIGMOIDOSCOPY      UPPER GASTROINTESTINAL ENDOSCOPY     [6]   Social History  Tobacco Use    Smoking status: Never     Passive exposure: Never    Smokeless tobacco: Never   Vaping Use    Vaping status: Never Used   Substance Use Topics    Alcohol use: Yes     Comment: RARE beer    Drug use: Never   [7]   Family History  Problem Relation Name Age of Onset    Other (cardiac disorder) Mother      Other (cva) Mother      Other (cardiac disorder) Father      Other (cardiac disorder) Sister      Cancer Sister      Other (MI) Sister

## 2025-08-04 ENCOUNTER — ANESTHESIA (OUTPATIENT)
Dept: OPERATING ROOM | Facility: HOSPITAL | Age: 85
End: 2025-08-04
Payer: MEDICARE

## 2025-08-04 ENCOUNTER — APPOINTMENT (OUTPATIENT)
Dept: OPERATING ROOM | Facility: HOSPITAL | Age: 85
End: 2025-08-04
Payer: MEDICARE

## 2025-08-04 ENCOUNTER — ANESTHESIA EVENT (OUTPATIENT)
Dept: OPERATING ROOM | Facility: HOSPITAL | Age: 85
End: 2025-08-04
Payer: MEDICARE

## 2025-08-04 VITALS
DIASTOLIC BLOOD PRESSURE: 63 MMHG | TEMPERATURE: 98.5 F | OXYGEN SATURATION: 99 % | HEIGHT: 63 IN | SYSTOLIC BLOOD PRESSURE: 123 MMHG | BODY MASS INDEX: 19.57 KG/M2 | HEART RATE: 71 BPM | RESPIRATION RATE: 25 BRPM | WEIGHT: 110.45 LBS

## 2025-08-04 PROBLEM — K57.31 DIVERTICULAR HEMORRHAGE: Status: ACTIVE | Noted: 2025-08-03

## 2025-08-04 PROBLEM — I48.0 PAROXYSMAL ATRIAL FIBRILLATION (MULTI): Status: ACTIVE | Noted: 2023-01-25

## 2025-08-04 PROBLEM — I10 HYPERTENSION: Status: RESOLVED | Noted: 2023-01-25 | Resolved: 2025-08-04

## 2025-08-04 PROBLEM — I48.91 ATRIAL FIBRILLATION (MULTI): Status: RESOLVED | Noted: 2023-01-25 | Resolved: 2025-08-04

## 2025-08-04 PROBLEM — D62 ACUTE BLOOD LOSS ANEMIA: Status: ACTIVE | Noted: 2025-08-04

## 2025-08-04 LAB
ANION GAP SERPL CALC-SCNC: 8 MMOL/L (ref 10–20)
BLOOD EXPIRATION DATE: NORMAL
BLOOD EXPIRATION DATE: NORMAL
BUN SERPL-MCNC: 5 MG/DL (ref 6–23)
CALCIUM SERPL-MCNC: 7.6 MG/DL (ref 8.6–10.3)
CHLORIDE SERPL-SCNC: 111 MMOL/L (ref 98–107)
CO2 SERPL-SCNC: 21 MMOL/L (ref 21–32)
CREAT SERPL-MCNC: 0.66 MG/DL (ref 0.5–1.05)
DISPENSE STATUS: NORMAL
DISPENSE STATUS: NORMAL
EGFRCR SERPLBLD CKD-EPI 2021: 87 ML/MIN/1.73M*2
ERYTHROCYTE [DISTWIDTH] IN BLOOD BY AUTOMATED COUNT: 20 % (ref 11.5–14.5)
ERYTHROCYTE [DISTWIDTH] IN BLOOD BY AUTOMATED COUNT: 20.4 % (ref 11.5–14.5)
GLUCOSE SERPL-MCNC: 84 MG/DL (ref 74–99)
HCT VFR BLD AUTO: 27.7 % (ref 36–46)
HCT VFR BLD AUTO: 28.8 % (ref 36–46)
HCT VFR BLD AUTO: 30 % (ref 36–46)
HGB BLD-MCNC: 9.2 G/DL (ref 12–16)
HGB BLD-MCNC: 9.5 G/DL (ref 12–16)
HGB BLD-MCNC: 9.9 G/DL (ref 12–16)
HOLD SPECIMEN: NORMAL
MAGNESIUM SERPL-MCNC: 1.64 MG/DL (ref 1.6–2.4)
MCH RBC QN AUTO: 28.1 PG (ref 26–34)
MCH RBC QN AUTO: 28.2 PG (ref 26–34)
MCHC RBC AUTO-ENTMCNC: 33 G/DL (ref 32–36)
MCHC RBC AUTO-ENTMCNC: 33.2 G/DL (ref 32–36)
MCV RBC AUTO: 85 FL (ref 80–100)
MCV RBC AUTO: 86 FL (ref 80–100)
NRBC BLD-RTO: 0 /100 WBCS (ref 0–0)
NRBC BLD-RTO: 0 /100 WBCS (ref 0–0)
PHOSPHATE SERPL-MCNC: 2.4 MG/DL (ref 2.5–4.9)
PLATELET # BLD AUTO: 196 X10*3/UL (ref 150–450)
PLATELET # BLD AUTO: 214 X10*3/UL (ref 150–450)
POTASSIUM SERPL-SCNC: 3.5 MMOL/L (ref 3.5–5.3)
PRODUCT BLOOD TYPE: 6200
PRODUCT BLOOD TYPE: 6200
PRODUCT CODE: NORMAL
PRODUCT CODE: NORMAL
RBC # BLD AUTO: 3.27 X10*6/UL (ref 4–5.2)
RBC # BLD AUTO: 3.37 X10*6/UL (ref 4–5.2)
SODIUM SERPL-SCNC: 136 MMOL/L (ref 136–145)
UNIT ABO: NORMAL
UNIT ABO: NORMAL
UNIT NUMBER: NORMAL
UNIT NUMBER: NORMAL
UNIT RH: NORMAL
UNIT RH: NORMAL
UNIT VOLUME: 350
UNIT VOLUME: 350
WBC # BLD AUTO: 6.4 X10*3/UL (ref 4.4–11.3)
WBC # BLD AUTO: 7 X10*3/UL (ref 4.4–11.3)
XM INTEP: NORMAL
XM INTEP: NORMAL

## 2025-08-04 PROCEDURE — 99233 SBSQ HOSP IP/OBS HIGH 50: CPT | Performed by: INTERNAL MEDICINE

## 2025-08-04 PROCEDURE — 85027 COMPLETE CBC AUTOMATED: CPT | Performed by: NURSE PRACTITIONER

## 2025-08-04 PROCEDURE — 3600000002 HC OR TIME - INITIAL BASE CHARGE - PROCEDURE LEVEL TWO: Performed by: NURSE ANESTHETIST, CERTIFIED REGISTERED

## 2025-08-04 PROCEDURE — 7100000001 HC RECOVERY ROOM TIME - INITIAL BASE CHARGE: Performed by: NURSE ANESTHETIST, CERTIFIED REGISTERED

## 2025-08-04 PROCEDURE — 2500000001 HC RX 250 WO HCPCS SELF ADMINISTERED DRUGS (ALT 637 FOR MEDICARE OP): Performed by: INTERNAL MEDICINE

## 2025-08-04 PROCEDURE — 2500000004 HC RX 250 GENERAL PHARMACY W/ HCPCS (ALT 636 FOR OP/ED): Performed by: NURSE PRACTITIONER

## 2025-08-04 PROCEDURE — 2500000004 HC RX 250 GENERAL PHARMACY W/ HCPCS (ALT 636 FOR OP/ED)

## 2025-08-04 PROCEDURE — 45378 DIAGNOSTIC COLONOSCOPY: CPT | Performed by: INTERNAL MEDICINE

## 2025-08-04 PROCEDURE — 3700000001 HC GENERAL ANESTHESIA TIME - INITIAL BASE CHARGE: Performed by: NURSE ANESTHETIST, CERTIFIED REGISTERED

## 2025-08-04 PROCEDURE — 0DJD8ZZ INSPECTION OF LOWER INTESTINAL TRACT, VIA NATURAL OR ARTIFICIAL OPENING ENDOSCOPIC: ICD-10-PCS | Performed by: INTERNAL MEDICINE

## 2025-08-04 PROCEDURE — 85014 HEMATOCRIT: CPT

## 2025-08-04 PROCEDURE — 2500000004 HC RX 250 GENERAL PHARMACY W/ HCPCS (ALT 636 FOR OP/ED): Performed by: NURSE ANESTHETIST, CERTIFIED REGISTERED

## 2025-08-04 PROCEDURE — 2500000004 HC RX 250 GENERAL PHARMACY W/ HCPCS (ALT 636 FOR OP/ED): Performed by: ANESTHESIOLOGY

## 2025-08-04 PROCEDURE — 80048 BASIC METABOLIC PNL TOTAL CA: CPT | Performed by: NURSE PRACTITIONER

## 2025-08-04 PROCEDURE — 1200000002 HC GENERAL ROOM WITH TELEMETRY DAILY

## 2025-08-04 PROCEDURE — 84100 ASSAY OF PHOSPHORUS: CPT

## 2025-08-04 PROCEDURE — 85027 COMPLETE CBC AUTOMATED: CPT | Performed by: INTERNAL MEDICINE

## 2025-08-04 PROCEDURE — 36415 COLL VENOUS BLD VENIPUNCTURE: CPT

## 2025-08-04 PROCEDURE — 99291 CRITICAL CARE FIRST HOUR: CPT | Performed by: INTERNAL MEDICINE

## 2025-08-04 PROCEDURE — 3600000007 HC OR TIME - EACH INCREMENTAL 1 MINUTE - PROCEDURE LEVEL TWO: Performed by: NURSE ANESTHETIST, CERTIFIED REGISTERED

## 2025-08-04 PROCEDURE — 7100000002 HC RECOVERY ROOM TIME - EACH INCREMENTAL 1 MINUTE: Performed by: NURSE ANESTHETIST, CERTIFIED REGISTERED

## 2025-08-04 PROCEDURE — 2500000005 HC RX 250 GENERAL PHARMACY W/O HCPCS

## 2025-08-04 PROCEDURE — 2500000001 HC RX 250 WO HCPCS SELF ADMINISTERED DRUGS (ALT 637 FOR MEDICARE OP): Performed by: NURSE PRACTITIONER

## 2025-08-04 PROCEDURE — 2500000002 HC RX 250 W HCPCS SELF ADMINISTERED DRUGS (ALT 637 FOR MEDICARE OP, ALT 636 FOR OP/ED): Performed by: NURSE PRACTITIONER

## 2025-08-04 PROCEDURE — 3700000002 HC GENERAL ANESTHESIA TIME - EACH INCREMENTAL 1 MINUTE: Performed by: NURSE ANESTHETIST, CERTIFIED REGISTERED

## 2025-08-04 PROCEDURE — 83735 ASSAY OF MAGNESIUM: CPT

## 2025-08-04 PROCEDURE — 36415 COLL VENOUS BLD VENIPUNCTURE: CPT | Performed by: NURSE PRACTITIONER

## 2025-08-04 RX ORDER — ONDANSETRON HYDROCHLORIDE 2 MG/ML
4 INJECTION, SOLUTION INTRAVENOUS ONCE AS NEEDED
Status: DISCONTINUED | OUTPATIENT
Start: 2025-08-04 | End: 2025-08-04

## 2025-08-04 RX ORDER — SODIUM CHLORIDE, SODIUM LACTATE, POTASSIUM CHLORIDE, CALCIUM CHLORIDE 600; 310; 30; 20 MG/100ML; MG/100ML; MG/100ML; MG/100ML
100 INJECTION, SOLUTION INTRAVENOUS CONTINUOUS
Status: CANCELLED | OUTPATIENT
Start: 2025-08-04 | End: 2025-08-04

## 2025-08-04 RX ORDER — ONDANSETRON HYDROCHLORIDE 2 MG/ML
4 INJECTION, SOLUTION INTRAVENOUS ONCE AS NEEDED
Status: CANCELLED | OUTPATIENT
Start: 2025-08-04

## 2025-08-04 RX ORDER — FAMOTIDINE 10 MG/ML
20 INJECTION, SOLUTION INTRAVENOUS ONCE
Status: CANCELLED | OUTPATIENT
Start: 2025-08-04 | End: 2025-08-04

## 2025-08-04 RX ORDER — SODIUM CHLORIDE, SODIUM LACTATE, POTASSIUM CHLORIDE, CALCIUM CHLORIDE 600; 310; 30; 20 MG/100ML; MG/100ML; MG/100ML; MG/100ML
100 INJECTION, SOLUTION INTRAVENOUS CONTINUOUS
Status: DISCONTINUED | OUTPATIENT
Start: 2025-08-04 | End: 2025-08-04

## 2025-08-04 RX ORDER — MORPHINE SULFATE 2 MG/ML
1 INJECTION, SOLUTION INTRAMUSCULAR; INTRAVENOUS EVERY 5 MIN PRN
Status: CANCELLED | OUTPATIENT
Start: 2025-08-04

## 2025-08-04 RX ORDER — LIDOCAINE HYDROCHLORIDE 20 MG/ML
INJECTION, SOLUTION EPIDURAL; INFILTRATION; INTRACAUDAL; PERINEURAL AS NEEDED
Status: DISCONTINUED | OUTPATIENT
Start: 2025-08-04 | End: 2025-08-04

## 2025-08-04 RX ORDER — MAGNESIUM SULFATE HEPTAHYDRATE 40 MG/ML
2 INJECTION, SOLUTION INTRAVENOUS ONCE
Status: COMPLETED | OUTPATIENT
Start: 2025-08-04 | End: 2025-08-04

## 2025-08-04 RX ORDER — PROPOFOL 10 MG/ML
INJECTION, EMULSION INTRAVENOUS AS NEEDED
Status: DISCONTINUED | OUTPATIENT
Start: 2025-08-04 | End: 2025-08-04

## 2025-08-04 RX ORDER — SODIUM CHLORIDE, SODIUM LACTATE, POTASSIUM CHLORIDE, CALCIUM CHLORIDE 600; 310; 30; 20 MG/100ML; MG/100ML; MG/100ML; MG/100ML
75 INJECTION, SOLUTION INTRAVENOUS CONTINUOUS
Status: DISCONTINUED | OUTPATIENT
Start: 2025-08-04 | End: 2025-08-04

## 2025-08-04 RX ORDER — ACETAMINOPHEN 325 MG/1
650 TABLET ORAL EVERY 4 HOURS PRN
Status: DISCONTINUED | OUTPATIENT
Start: 2025-08-04 | End: 2025-08-05 | Stop reason: HOSPADM

## 2025-08-04 RX ORDER — PANTOPRAZOLE SODIUM 40 MG/1
40 TABLET, DELAYED RELEASE ORAL
Status: DISCONTINUED | OUTPATIENT
Start: 2025-08-05 | End: 2025-08-05 | Stop reason: HOSPADM

## 2025-08-04 RX ORDER — LIDOCAINE HYDROCHLORIDE 10 MG/ML
0.1 INJECTION, SOLUTION EPIDURAL; INFILTRATION; INTRACAUDAL; PERINEURAL ONCE
Status: DISCONTINUED | OUTPATIENT
Start: 2025-08-04 | End: 2025-08-04

## 2025-08-04 RX ORDER — LIDOCAINE HYDROCHLORIDE 10 MG/ML
0.1 INJECTION, SOLUTION EPIDURAL; INFILTRATION; INTRACAUDAL; PERINEURAL ONCE
Status: CANCELLED | OUTPATIENT
Start: 2025-08-04 | End: 2025-08-04

## 2025-08-04 RX ORDER — SODIUM CHLORIDE, SODIUM LACTATE, POTASSIUM CHLORIDE, CALCIUM CHLORIDE 600; 310; 30; 20 MG/100ML; MG/100ML; MG/100ML; MG/100ML
75 INJECTION, SOLUTION INTRAVENOUS CONTINUOUS
Status: CANCELLED | OUTPATIENT
Start: 2025-08-04 | End: 2025-08-04

## 2025-08-04 RX ORDER — OXYCODONE HYDROCHLORIDE 5 MG/1
5 TABLET ORAL EVERY 4 HOURS PRN
Status: DISCONTINUED | OUTPATIENT
Start: 2025-08-04 | End: 2025-08-04

## 2025-08-04 RX ORDER — OXYCODONE HYDROCHLORIDE 5 MG/1
5 TABLET ORAL EVERY 4 HOURS PRN
Refills: 0 | Status: CANCELLED | OUTPATIENT
Start: 2025-08-04

## 2025-08-04 RX ORDER — FAMOTIDINE 10 MG/ML
20 INJECTION, SOLUTION INTRAVENOUS ONCE
Status: COMPLETED | OUTPATIENT
Start: 2025-08-04 | End: 2025-08-04

## 2025-08-04 RX ORDER — MORPHINE SULFATE 2 MG/ML
1 INJECTION, SOLUTION INTRAMUSCULAR; INTRAVENOUS EVERY 5 MIN PRN
Status: DISCONTINUED | OUTPATIENT
Start: 2025-08-04 | End: 2025-08-04

## 2025-08-04 RX ADMIN — METHIMAZOLE 5 MG: 5 TABLET ORAL at 11:01

## 2025-08-04 RX ADMIN — ATORVASTATIN CALCIUM 40 MG: 40 TABLET, FILM COATED ORAL at 21:11

## 2025-08-04 RX ADMIN — PROPOFOL 100 MG: 10 INJECTION, EMULSION INTRAVENOUS at 08:42

## 2025-08-04 RX ADMIN — PANTOPRAZOLE SODIUM 40 MG: 40 INJECTION, POWDER, FOR SOLUTION INTRAVENOUS at 11:01

## 2025-08-04 RX ADMIN — SODIUM CHLORIDE, POTASSIUM CHLORIDE, SODIUM LACTATE AND CALCIUM CHLORIDE: 600; 310; 30; 20 INJECTION, SOLUTION INTRAVENOUS at 08:45

## 2025-08-04 RX ADMIN — PROPOFOL 50 MG: 10 INJECTION, EMULSION INTRAVENOUS at 08:47

## 2025-08-04 RX ADMIN — LIDOCAINE HYDROCHLORIDE 60 MG: 20 INJECTION, SOLUTION EPIDURAL; INFILTRATION; INTRACAUDAL; PERINEURAL at 08:41

## 2025-08-04 RX ADMIN — MAGNESIUM SULFATE HEPTAHYDRATE 2 G: 40 INJECTION, SOLUTION INTRAVENOUS at 05:53

## 2025-08-04 RX ADMIN — FAMOTIDINE 20 MG: 10 INJECTION, SOLUTION INTRAVENOUS at 07:58

## 2025-08-04 RX ADMIN — AMIODARONE HYDROCHLORIDE 200 MG: 200 TABLET ORAL at 11:01

## 2025-08-04 RX ADMIN — POTASSIUM PHOSPHATE, MONOBASIC AND POTASSIUM PHOSPHATE, DIBASIC 15 MMOL: 224; 236 INJECTION, SOLUTION, CONCENTRATE INTRAVENOUS at 06:07

## 2025-08-04 RX ADMIN — PROPOFOL 50 MG: 10 INJECTION, EMULSION INTRAVENOUS at 08:49

## 2025-08-04 RX ADMIN — FERROUS SULFATE TAB 325 MG (65 MG ELEMENTAL FE) 1 TABLET: 325 (65 FE) TAB at 11:01

## 2025-08-04 SDOH — HEALTH STABILITY: MENTAL HEALTH: CURRENT SMOKER: 0

## 2025-08-04 ASSESSMENT — COGNITIVE AND FUNCTIONAL STATUS - GENERAL
MOBILITY SCORE: 24
MOBILITY SCORE: 24
DAILY ACTIVITIY SCORE: 24
DAILY ACTIVITIY SCORE: 24

## 2025-08-04 ASSESSMENT — ENCOUNTER SYMPTOMS
COUGH: 0
CHEST TIGHTNESS: 0
VOMITING: 0
FACIAL SWELLING: 0
CHILLS: 0
SORE THROAT: 0
BACK PAIN: 0
WEAKNESS: 0
CHOKING: 0
ABDOMINAL PAIN: 0
FEVER: 0
JOINT SWELLING: 0
LIGHT-HEADEDNESS: 0
CONFUSION: 0
SHORTNESS OF BREATH: 0
NAUSEA: 0
HALLUCINATIONS: 0
FATIGUE: 0
DYSURIA: 0
PALPITATIONS: 0
WHEEZING: 0
DIAPHORESIS: 0
NECK PAIN: 0
AGITATION: 0
CONSTIPATION: 0
DIARRHEA: 0
NERVOUS/ANXIOUS: 0
WOUND: 0
HEMATURIA: 0

## 2025-08-04 ASSESSMENT — PAIN SCALES - GENERAL
PAINLEVEL_OUTOF10: 0 - NO PAIN
PAIN_LEVEL: 0
PAINLEVEL_OUTOF10: 0 - NO PAIN

## 2025-08-04 ASSESSMENT — PAIN - FUNCTIONAL ASSESSMENT
PAIN_FUNCTIONAL_ASSESSMENT: 0-10

## 2025-08-04 NOTE — PROGRESS NOTES
Critical Care Daily Progress Notes        Subjective   Patient is a 85 y.o. female admitted on 8/3/2025 10:43 AM with the following indication(s) for ICU care: Acute gastrointestinal bleed.     Interval History:   Dianne Clay is an 85 y.o. female with PMHx of CAD s/p PCI 2003, recurrent GI bleeds, HTN, PAF and anemia who presented yesterday to [facility] ED with an unwitnessed syncopal fall from standing up after she had a bloody BM and was straining.  Admitted to ICU because of acute gastrointestinal bleed    8/4/2025: Patient hemoglobin stabilized, latest hemoglobin of 9.2 after 2 units of packed RBC, lowest earlier this morning was 5.9    Scheduled Medications:   Scheduled Medications[1]     Continuous Medications:   Continuous Medications[2]     PRN Medications:   PRN Medications[3]    Objective   Vitals:  Most Recent:  Vitals:    08/04/25 0940   BP: 137/67   Pulse: 71   Resp: 19   Temp:    SpO2: 100%       24hr Min/Max:  Temp  Min: 36 °C (96.8 °F)  Max: 37.2 °C (99 °F)  Pulse  Min: 60  Max: 90  BP  Min: 101/52  Max: 167/82  Resp  Min: 8  Max: 38  SpO2  Min: 91 %  Max: 100 %    LDA:         Vent settings:       Hemodynamic parameters for last 24 hours:       I/O:    Intake/Output Summary (Last 24 hours) at 8/4/2025 0953  Last data filed at 8/4/2025 0907  Gross per 24 hour   Intake 1410 ml   Output --   Net 1410 ml       Physical Exam:   Physical Exam  Vitals reviewed.   Constitutional:       Comments: Cachectic   HENT:      Head: Normocephalic and atraumatic.     Eyes:      Conjunctiva/sclera: Conjunctivae normal.      Pupils: Pupils are equal, round, and reactive to light.       Cardiovascular:      Rate and Rhythm: Normal rate and regular rhythm.   Pulmonary:      Effort: Pulmonary effort is normal.      Breath sounds: Normal breath sounds.   Abdominal:      General: Abdomen is flat.      Palpations: Abdomen is soft.     Musculoskeletal:         General: Normal range of motion.     Skin:     General: Skin is  warm and dry.     Neurological:      General: No focal deficit present.      Mental Status: She is alert and oriented to person, place, and time. Mental status is at baseline.     Psychiatric:         Mood and Affect: Mood normal.         Behavior: Behavior normal.          Lab/Radiology/Diagnostic Review:  Results for orders placed or performed during the hospital encounter of 08/03/25 (from the past 24 hours)   CBC   Result Value Ref Range    WBC 7.4 4.4 - 11.3 x10*3/uL    nRBC 0.0 0.0 - 0.0 /100 WBCs    RBC 3.34 (L) 4.00 - 5.20 x10*6/uL    Hemoglobin 9.4 (L) 12.0 - 16.0 g/dL    Hematocrit 29.0 (L) 36.0 - 46.0 %    MCV 87 80 - 100 fL    MCH 28.1 26.0 - 34.0 pg    MCHC 32.4 32.0 - 36.0 g/dL    RDW 19.3 (H) 11.5 - 14.5 %    Platelets 207 150 - 450 x10*3/uL   Basic metabolic panel   Result Value Ref Range    Glucose 84 74 - 99 mg/dL    Sodium 139 136 - 145 mmol/L    Potassium 3.4 (L) 3.5 - 5.3 mmol/L    Chloride 110 (H) 98 - 107 mmol/L    Bicarbonate 20 (L) 21 - 32 mmol/L    Anion Gap 12 10 - 20 mmol/L    Urea Nitrogen 6 6 - 23 mg/dL    Creatinine 0.52 0.50 - 1.05 mg/dL    eGFR >90 >60 mL/min/1.73m*2    Calcium 7.4 (L) 8.6 - 10.3 mg/dL   Lactate   Result Value Ref Range    Lactate 1.8 0.4 - 2.0 mmol/L   Hemoglobin and hematocrit, blood   Result Value Ref Range    Hemoglobin 9.1 (L) 12.0 - 16.0 g/dL    Hematocrit 27.5 (L) 36.0 - 46.0 %   Basic Metabolic Panel   Result Value Ref Range    Glucose 84 74 - 99 mg/dL    Sodium 136 136 - 145 mmol/L    Potassium 3.5 3.5 - 5.3 mmol/L    Chloride 111 (H) 98 - 107 mmol/L    Bicarbonate 21 21 - 32 mmol/L    Anion Gap 8 (L) 10 - 20 mmol/L    Urea Nitrogen 5 (L) 6 - 23 mg/dL    Creatinine 0.66 0.50 - 1.05 mg/dL    eGFR 87 >60 mL/min/1.73m*2    Calcium 7.6 (L) 8.6 - 10.3 mg/dL   CBC   Result Value Ref Range    WBC 6.4 4.4 - 11.3 x10*3/uL    nRBC 0.0 0.0 - 0.0 /100 WBCs    RBC 3.27 (L) 4.00 - 5.20 x10*6/uL    Hemoglobin 9.2 (L) 12.0 - 16.0 g/dL    Hematocrit 27.7 (L) 36.0 - 46.0 %     MCV 85 80 - 100 fL    MCH 28.1 26.0 - 34.0 pg    MCHC 33.2 32.0 - 36.0 g/dL    RDW 20.0 (H) 11.5 - 14.5 %    Platelets 196 150 - 450 x10*3/uL   Magnesium   Result Value Ref Range    Magnesium 1.64 1.60 - 2.40 mg/dL   Phosphorus   Result Value Ref Range    Phosphorus 2.4 (L) 2.5 - 4.9 mg/dL     Imaging  CT angio abdomen pelvis w and or wo IV IV contrast  Result Date: 8/3/2025  No acute vascular abnormality.  No evidence of active arterial bleeding. Overall no acute CT findings in the abdomen and pelvis. Mild gastric wall thickening and a few areas of mild colonic wall thickening nonspecific and similar compared to the prior exam. Mild biliary dilatation is similar compared to prior exams likely reflecting reservoir effect. Signed by Adebayo Landeros MD    CT head wo IV contrast  Result Date: 8/3/2025  CT HEAD: 1. No acute intracranial abnormality or calvarial fracture. 2. Stable calcified meningioma arising from the right tegmen mastoideum measuring 1.1 cm x 0.8 cm.     CT CERVICAL SPINE: 1. No acute fracture or traumatic malalignment of the cervical spine. 2. Spondylotic changes of the cervical spine as detailed above.   MACRO: None.   Signed by: Sukhdev Carrion 8/3/2025 2:04 AM Dictation workstation:   [id number]    CT cervical spine wo IV contrast  Result Date: 8/3/2025  CT HEAD: 1. No acute intracranial abnormality or calvarial fracture. 2. Stable calcified meningioma arising from the right tegmen mastoideum measuring 1.1 cm x 0.8 cm.     CT CERVICAL SPINE: 1. No acute fracture or traumatic malalignment of the cervical spine. 2. Spondylotic changes of the cervical spine as detailed above.   MACRO: None.   Signed by: Sukhdev Carrion 8/3/2025 2:04 AM Dictation workstation:   [id number]      Cardiology, Vascular, and Other Imaging  Colonoscopy Diagnostic  Result Date: 8/4/2025  Table formatting from the original result was not included. Impression The terminal ileum appeared normal. The ileocecal valve,  appendiceal orifice and cecum appeared normal. Moderate extensive diverticulosis containing stool in the ascending colon, transverse colon and descending colon Moderate localized diverticulosis in the sigmoid colon; there was stigmata of recent hemorrhage Internal small, flat hemorrhoids Findings The terminal ileum appeared normal. The ileocecal valve, appendiceal orifice and cecum appeared normal. Moderate diverticulosis with few small and medium, extensive diverticula with no inflammation containing stool in the ascending colon, transverse colon and descending colon Moderate diverticulosis with multiple small and large localized diverticula containing no content in the sigmoid colon; there was stigmata of recent hemorrhage. Old blood/hematin seen in the sigmoid colon irrigated with no active bleeding seen Internal small, flat hemorrhoids observed during retroflexion  Recommendation Return to the floor for ongoing care Old blood/hematin present in the sigmoid colon no active bleeding seen Most likely diverticular bleed that has stopped If rebleeding occurs recommend repeat CTA and IR involvement High fiber 30g daily Okay for diet from GI POV  Indication Gastrointestinal hemorrhage, unspecified gastrointestinal hemorrhage type Post-Op Diagnosis None Staff Staff Role Yoandy Simms DO Proceduralist Medications See Anesthesia Record. Preprocedure A history and physical has been performed, and patient medication allergies have been reviewed. The patient's tolerance of previous anesthesia has been reviewed. The risks and benefits of the procedure and the sedation options and risks were discussed with the patient. All questions were answered and informed consent obtained. Details of the Procedure The patient underwent monitored anesthesia care, which was administered by an anesthesia professional. The patient's blood pressure, ECG, ETCO2, heart rate, level of consciousness, oxygen and respirations were monitored  throughout the procedure. A digital rectal exam was performed. The scope was introduced through the anus and advanced to the terminal ileum, 10 cm from the ileocecal valve. Retroflexion was performed in the rectum. The quality of bowel preparation was evaluated using the Ozan Bowel Preparation Scale with scores of: right colon = 2, transverse colon = 2, left colon = 2. The total BBPS score was 6. Bowel prep was adequate. The patient experienced no blood loss. The procedure was not difficult. The patient tolerated the procedure well. There were no apparent adverse events. Prior to the procedure, a History and Physical was performed, and patient medications and allergies were reviewed. Immediately prior to the administration of medications, the patient was re-assessed for adequacy to receive sedatives. The heart rate, respiratory rate, oxygen saturations, blood pressure, adequacy of pulmonary ventilation, and response to care were monitored throughout the procedure. The physical status of the patient was re-assessed after the procedure. The risks and benefits of the procedure and the sedation options and risks were discussed with the patient and/or family including the risk of injury to internal organs (including perforation) and the risk of missed lesions. All questions were answered and informed consent was obtained. Patient identification and proposed procedure were verified by the physician, the nurse, the anesthetist and the technician in the pre-procedure area in the procedure room. After this verification the variable stiffness pediatric colonoscope or upper endoscope was passed under direct vision. No sedation was administered by endoscopist. Sedation was administered by the anesthesia staff. Refer to anesthesia documentation/charting for details regarding medications administered and doses given. Events Procedure Events Event Event Time ENDO SCOPE IN TIME 8/4/2025  8:44 AM ENDO CECUM REACHED 8/4/2025  8:48  "AM ENDO SCOPE OUT TIME 8/4/2025  8:55 AM Specimens No specimens collected Procedure Location [facility]ensive [facility] [address] [facility] [address] [phone] Referring Provider Roel Javier MD Procedure Provider DO Roel Vaughn                        Assessment/Plan   Assessment & Plan  GI bleed    Dianne Clay is an 85 y.o. female with PMHx of CAD s/p PCI 2003, recurrent GI bleeds, HTN, PAF and anemia who presented yesterday to [facility] ED with an unwitnessed syncopal fall from standing up after she had a bloody BM and was straining.  Admitted to ICU because of acute gastrointestinal bleed      Acute gastrointestinal bleed, lower most likely secondary to diverticular bleed  8/4/2025: Patient hemoglobin stabilized, latest hemoglobin of 9.2 after 2 units of packed RBC, lowest earlier this morning was 5.9  No signs of active bleed at this time  Transfuse for hemoglobin less than 7 or active bleed with hemodynamic instability  Colonoscopy was performed on 8/4/2025. Recommendations as per GI \"old blood/hematin present in the sigmoid colon no active bleeding seen  Most likely diverticular bleed that has stopped   If rebleeding occurs recommend repeat CTA and IR involvement   High fiber 30g daily\".    2.  History of atrial fibrillation  Continue amiodarone  Avoid blood thinners at this time  I had extensive discussion with the patient regarding the increased risk of stroke with paroxysmal atrial fibrillation and she not being able to start blood thinners because of high risk of bleeding  Patient notified that she Dr. Hutchins for Cardiology evaluation for Watchman procedure    I spent 30 minutes of cumulative critical care time with the patient.  Greater than 50% of that time was spent in the direct collaboration and or coordination of care of the patient.   Will transfer the patient to stepdown.     Dragon dictation software was used to " dictate this note and thus there may be minor errors in translation/transcription including garbled speech or misspellings. Please contact for clarification if needed.          [1] amiodarone, 200 mg, oral, Daily  atorvastatin, 40 mg, oral, Nightly  ferrous sulfate, 65 mg of elemental iron, oral, Daily with breakfast  lidocaine, 0.1 mL, subcutaneous, Once  methIMAzole, 5 mg, oral, Daily  pantoprazole, 40 mg, intravenous, BID  potassium phosphate, 15 mmol, intravenous, Once    [2] lactated Ringer's, 75 mL/hr, Last Rate: 125 mL/hr (08/04/25 0845)  lactated Ringer's, 100 mL/hr    [3] PRN medications: acetaminophen, morphine, ondansetron, oxyCODONE

## 2025-08-04 NOTE — CARE PLAN
Problem: Pain - Adult  Goal: Verbalizes/displays adequate comfort level or baseline comfort level  Outcome: Progressing     Problem: Safety - Adult  Goal: Free from fall injury  Outcome: Progressing     Problem: Discharge Planning  Goal: Discharge to home or other facility with appropriate resources  Outcome: Progressing     Problem: Chronic Conditions and Co-morbidities  Goal: Patient's chronic conditions and co-morbidity symptoms are monitored and maintained or improved  Outcome: Progressing     Problem: Nutrition  Goal: Nutrient intake appropriate for maintaining nutritional needs  Outcome: Progressing       The clinical goals for the shift include Pt will maintain Hgb >7.0 throughout shift

## 2025-08-04 NOTE — ANESTHESIA PREPROCEDURE EVALUATION
Patient: Dianne Clay    Procedure Information       Anesthesia Start Date/Time: 08/04/25 0834    Scheduled providers: Yoandy Simms DO; Kerri Connors RN    Procedure: COLONOSCOPY    Location: [facility] OR            Relevant Problems   Cardiac   (+) Atrial fibrillation (Multi)   (+) CAD (coronary artery disease)   (+) Hypertension   (+) PAD (peripheral artery disease)      GI   (+) GERD (gastroesophageal reflux disease)   (+) GI bleed   (+) Gastrointestinal hemorrhage   (+) Rectal bleed   (+) Rectal bleeding      Endocrine   (+) Hyperthyroidism      Hematology   (+) Anemia      Musculoskeletal   (+) OA (osteoarthritis) of knee       Clinical information reviewed:   Tobacco  Allergies  Meds   Med Hx  Surg Hx  OB Status  Fam Hx  Soc   Hx        NPO Detail:  NPO/Void Status  Carbohydrate Drink Given Prior to Surgery? : N  Date of Last Liquid: 08/03/25  Time of Last Liquid: 2200  Date of Last Solid: 08/02/25  Time of Last Solid: 0800  Last Intake Type: Clear fluids  Time of Last Void: 0700         PHYSICAL EXAM    Anesthesia Plan    History of general anesthesia?: yes  History of complications of general anesthesia?: no    ASA 3     MAC     The patient is not a current smoker.  Patient was not previously instructed to abstain from smoking on day of procedure.  Patient did not smoke on day of procedure.    intravenous induction   Anesthetic plan and risks discussed with patient.  Use of blood products discussed with patient who consented to blood products.    Plan discussed with CRNA.

## 2025-08-04 NOTE — ASSESSMENT & PLAN NOTE
Multiple prior admissions for GI/diverticular bleeding in 2024, 2023, 2022 and 2014  Colonoscopy Sept 2024 revealed red blood and clots and extensive diverticulosis in the entire colon but no active bleeding, and no culprit lesion was identified    GI recommended no intervention during last admission in March  Baseline Hgb is 9-10  Consult GI, CLD for now, continue IV Protonix  8/4: Colonoscopy done today per GI.  No evidence of active bleeding.  Suspect diverticular bleed by history.  Will continue to monitor overnight.  If she bleeds again will need to come back to the hospital, if brisk enough may be candidate for IR.  Otherwise may need to consider surgical resection if this continues to recur.

## 2025-08-04 NOTE — ASSESSMENT & PLAN NOTE
Pt is s/p PCI in 2003 and underwent L&R heart cath 3/4/25 without intervention  Continue Lipitor, pt is no longer on atenolol

## 2025-08-04 NOTE — POST-PROCEDURE NOTE
See Emr for full details     Pan diverticulosis     Old blood/hematin in the sigmoid colon irrigated no active bleeding seen       No fresh blood was seen         Recommendations     Return to the floor for ongoing care   Old blood/hematin present in the sigmoid colon no active bleeding seen  Most likely diverticular bleed that has stopped   If rebleeding occurs recommend repeat CTA and IR involvement   High fiber 30g daily   Okay for diet from GI GIANA Simms,   9:11 AM

## 2025-08-04 NOTE — PROGRESS NOTES
08/04/25 1400   Discharge Planning   Living Arrangements Alone   Support Systems Friends/neighbors   Assistance Needed none   Type of Residence Private residence   Home or Post Acute Services None   Expected Discharge Disposition Home   Does the patient need discharge transport arranged? No   Stroke Family Assessment   Stroke Family Assessment Needed No   Intensity of Service   Intensity of Service 0-30 min     Discharge planning assessment completed with patient. She lives alone at home, states she is independent with mobility and self care needs. Patient follows with PCP Nemo Carbajal. Her family doesn't live nearby, and patient relies on a friend for transportation to appointments and errands. Her friend will be transporting her home from the hospital as well. Patient is not currently active with any home services. She adamantly denies home needs at this time.

## 2025-08-04 NOTE — PROGRESS NOTES
Social work consult placed for positive medical risk screen. SW reviewed pt's chart and communicated with T[facility]. No SW needs foreseen at this time. SW signing off; available upon request.    Yoandy Perdomo, MSW, LSW ([id number])   Care Transitions

## 2025-08-04 NOTE — ANESTHESIA POSTPROCEDURE EVALUATION
Patient: Dianne Clay    Procedure Summary       Date: 08/04/25 Room / Location: [facility] OR    Anesthesia Start: 0834 Anesthesia Stop: 0902    Procedure: COLONOSCOPY Diagnosis: Gastrointestinal hemorrhage, unspecified gastrointestinal hemorrhage type    Scheduled Providers: Yoandy Simms DO; Kerri Connors RN Responsible Provider: JASWINDER Gloria    Anesthesia Type: MAC ASA Status: 3            Anesthesia Type: MAC    Vitals Value Taken Time   /78 08/04/25 09:40   Temp 36.3 °C (97.3 °F) 08/04/25 09:30   Pulse 66 08/04/25 09:42   Resp 19 08/04/25 09:42   SpO2 100 % 08/04/25 09:42   Vitals shown include unfiled device data.    Anesthesia Post Evaluation    Patient location during evaluation: PACU  Patient participation: complete - patient participated  Level of consciousness: awake  Pain score: 0  Pain management: adequate  Airway patency: patent  Cardiovascular status: acceptable  Respiratory status: acceptable  Hydration status: acceptable  Postoperative Nausea and Vomiting: none        There were no known notable events for this encounter.

## 2025-08-04 NOTE — ASSESSMENT & PLAN NOTE
Pt was started on apixaban in March which was stopped after an admission two days later for GIB  She was advised to follow up with her cardiologist Dr. Queen for consideration of Watchman device but she cancelled the procedure after having doubts  I advised her to notify her cardiologist (which she says she hasn't) and strongly urged her to d/w him and reconsider the procedure as it mitigates her risk for VTE in setting of recurrent GIB

## 2025-08-04 NOTE — PROGRESS NOTES
Dianne Clay is a 85 y.o. female on day 1 of admission presenting with Diverticular hemorrhage.    Review of Systems   Constitutional:  Negative for chills, diaphoresis, fatigue and fever.   HENT:  Negative for congestion, facial swelling, sneezing and sore throat.    Respiratory:  Negative for cough, choking, chest tightness, shortness of breath and wheezing.    Cardiovascular:  Negative for chest pain, palpitations and leg swelling.   Gastrointestinal:  Negative for abdominal pain, constipation, diarrhea, nausea and vomiting.   Genitourinary:  Negative for dysuria, hematuria and urgency.   Musculoskeletal:  Negative for back pain, gait problem, joint swelling and neck pain.   Skin:  Negative for rash and wound.   Neurological:  Negative for syncope, weakness and light-headedness.   Psychiatric/Behavioral:  Negative for agitation, confusion and hallucinations. The patient is not nervous/anxious.    All other systems reviewed and are negative.     Subjective   Dianne Clay is an 85 y.o. female with PMHx of CAD s/p PCI 2003, recurrent GI bleeds, HTN, PAF and anemia who presented yesterday to [facility] ED with an unwitnessed syncopal fall from standing up after she had a bloody BM and was straining. She had a BM at 0800 yesterday and noted light red blood. Over the course of the next 8-9 hours she had 4 more episodes of blood BMs. She noted darkening blood and some dark as well as light-colored stool. The last time she remembered feeling faint then waking on floor, she believes, moments later but unsure of time. She did strike her head and sustained a slight abrasion and edema to left eyebrow. She was admitted for GIB in March and apixaban was stopped (was just started 2 days earlier). Since then she had one episode of BRBPR and went to [facility] ED on 7/16/25. Her Hgb was 11.2 and she was discharged with Anusol for hemorrhoids. Remainder of ROS reviewed and negative except as indicated in HPI.     8/4: Patient seen  today.  No new complaints.  No further bleeding noted.  No chest pain or shortness of breath.  Colonoscopy done today per GI.  No evidence of active bleeding were noted.  GI suspects patient may have acute diverticular bleeding.  If this continues to recur patient may consider surgical resection.  Otherwise if bleeding is brisk enough may need to consider IR intervention.  At this time patient appears to be stable after 2 units.  Will observe patient overnight, if otherwise stable can consider discharge tomorrow.  Patient is urged to discuss with cardiology whether she may be a candidate for a Watchman procedure.       Objective     Last Recorded Vitals  /61 (BP Location: Right arm, Patient Position: Lying)   Pulse 80   Temp 37.1 °C (98.7 °F) (Temporal)   Resp 16   Wt 45.8 kg (101 lb)   SpO2 99%   Intake/Output last 3 Shifts:    Intake/Output Summary (Last 24 hours) at 8/4/2025 1802  Last data filed at 8/4/2025 1619  Gross per 24 hour   Intake 1457.08 ml   Output 1 ml   Net 1456.08 ml       Admission Weight  Weight: 50.1 kg (110 lb 7.2 oz) (08/03/25 1044)    Daily Weight  08/04/25 : 45.8 kg (101 lb)      Physical Exam  Constitutional:       General: She is not in acute distress.  HENT:      Head: Normocephalic and atraumatic.      Nose: Nose normal. No congestion or rhinorrhea.      Mouth/Throat:      Mouth: Mucous membranes are dry.      Pharynx: Oropharynx is clear.     Eyes:      General: No scleral icterus.     Extraocular Movements: Extraocular movements intact.      Pupils: Pupils are equal, round, and reactive to light.       Cardiovascular:      Rate and Rhythm: Normal rate and regular rhythm.      Heart sounds: Normal heart sounds. No murmur heard.     No friction rub. No gallop.   Pulmonary:      Effort: Pulmonary effort is normal.      Breath sounds: Normal breath sounds. No wheezing, rhonchi or rales.   Chest:      Chest wall: No tenderness.   Abdominal:      General: There is no distension.       Palpations: Abdomen is soft.      Tenderness: There is no abdominal tenderness. There is no guarding or rebound.     Musculoskeletal:         General: No swelling, tenderness or signs of injury. Normal range of motion.      Cervical back: Normal range of motion.     Skin:     General: Skin is warm and dry.     Neurological:      General: No focal deficit present.      Mental Status: She is alert and oriented to person, place, and time.          Lab Results  Results for orders placed or performed during the hospital encounter of 08/03/25 (from the past 24 hours)   Hemoglobin and hematocrit, blood   Result Value Ref Range    Hemoglobin 9.1 (L) 12.0 - 16.0 g/dL    Hematocrit 27.5 (L) 36.0 - 46.0 %   Basic Metabolic Panel   Result Value Ref Range    Glucose 84 74 - 99 mg/dL    Sodium 136 136 - 145 mmol/L    Potassium 3.5 3.5 - 5.3 mmol/L    Chloride 111 (H) 98 - 107 mmol/L    Bicarbonate 21 21 - 32 mmol/L    Anion Gap 8 (L) 10 - 20 mmol/L    Urea Nitrogen 5 (L) 6 - 23 mg/dL    Creatinine 0.66 0.50 - 1.05 mg/dL    eGFR 87 >60 mL/min/1.73m*2    Calcium 7.6 (L) 8.6 - 10.3 mg/dL   CBC   Result Value Ref Range    WBC 6.4 4.4 - 11.3 x10*3/uL    nRBC 0.0 0.0 - 0.0 /100 WBCs    RBC 3.27 (L) 4.00 - 5.20 x10*6/uL    Hemoglobin 9.2 (L) 12.0 - 16.0 g/dL    Hematocrit 27.7 (L) 36.0 - 46.0 %    MCV 85 80 - 100 fL    MCH 28.1 26.0 - 34.0 pg    MCHC 33.2 32.0 - 36.0 g/dL    RDW 20.0 (H) 11.5 - 14.5 %    Platelets 196 150 - 450 x10*3/uL   Magnesium   Result Value Ref Range    Magnesium 1.64 1.60 - 2.40 mg/dL   Phosphorus   Result Value Ref Range    Phosphorus 2.4 (L) 2.5 - 4.9 mg/dL   Hemoglobin and hematocrit, blood   Result Value Ref Range    Hemoglobin 9.9 (L) 12.0 - 16.0 g/dL    Hematocrit 30.0 (L) 36.0 - 46.0 %        Image Results  ECG 12 Lead  Sinus rhythm with Premature atrial complexes  Left posterior fascicular block  Abnormal ECG  When compared with ECG of 17-MAR-2025 11:13,  Premature atrial complexes are now  Present  Left posterior fascicular block is now Present  T wave inversion now evident in Lateral leads  Colonoscopy Diagnostic  Table formatting from the original result was not included.  Impression  The terminal ileum appeared normal.  The ileocecal valve, appendiceal orifice and cecum appeared normal.  Moderate extensive diverticulosis containing stool in the ascending colon,   transverse colon and descending colon  Moderate localized diverticulosis in the sigmoid colon; there was stigmata   of recent hemorrhage  Internal small, flat hemorrhoids    Findings  The terminal ileum appeared normal.  The ileocecal valve, appendiceal orifice and cecum appeared normal.  Moderate diverticulosis with few small and medium, extensive diverticula   with no inflammation containing stool in the ascending colon, transverse   colon and descending colon  Moderate diverticulosis with multiple small and large localized   diverticula containing no content in the sigmoid colon; there was stigmata   of recent hemorrhage. Old blood/hematin seen in the sigmoid colon   irrigated with no active bleeding seen  Internal small, flat hemorrhoids observed during retroflexion         Recommendation  Return to the floor for ongoing care   Old blood/hematin present in the sigmoid colon no active bleeding seen  Most likely diverticular bleed that has stopped   If rebleeding occurs recommend repeat CTA and IR involvement   High fiber 30g daily   Okay for diet from GI POV        Indication  Gastrointestinal hemorrhage, unspecified gastrointestinal hemorrhage type    Post-Op Diagnosis  None    Staff  Staff Role   Yoandy Simms DO Proceduralist     Medications  See Anesthesia Record.     Preprocedure  A history and physical has been performed, and patient medication   allergies have been reviewed. The patient's tolerance of previous   anesthesia has been reviewed. The risks and benefits of the procedure and   the sedation options and risks were  discussed with the patient. All   questions were answered and informed consent obtained.    Details of the Procedure  The patient underwent monitored anesthesia care, which was administered by   an anesthesia professional. The patient's blood pressure, ECG, ETCO2,   heart rate, level of consciousness, oxygen and respirations were monitored   throughout the procedure. A digital rectal exam was performed. The scope   was introduced through the anus and advanced to the terminal ileum, 10 cm   from the ileocecal valve. Retroflexion was performed in the rectum. The   quality of bowel preparation was evaluated using the Dutton Bowel   Preparation Scale with scores of: right colon = 2, transverse colon = 2,   left colon = 2. The total BBPS score was 6. Bowel prep was adequate. The   patient experienced no blood loss. The procedure was not difficult. The   patient tolerated the procedure well. There were no apparent adverse   events.   Prior to the procedure, a History and Physical was performed, and patient   medications and allergies were reviewed. Immediately prior to the   administration of medications, the patient was re-assessed for adequacy to   receive sedatives. The heart rate, respiratory rate, oxygen saturations,   blood pressure, adequacy of pulmonary ventilation, and response to care   were monitored throughout the procedure. The physical status of the   patient was re-assessed after the procedure.     The risks and benefits of the procedure and the sedation options and risks   were discussed with the patient and/or family including the risk of injury   to internal organs (including perforation) and the risk of missed lesions.   All questions were answered and informed consent was obtained.     Patient identification and proposed procedure were verified by the   physician, the nurse, the anesthetist and the technician in the   pre-procedure area in the procedure room. After this verification the   variable  stiffness pediatric colonoscope or upper endoscope was passed   under direct vision.    No sedation was administered by endoscopist. Sedation was administered by   the anesthesia staff. Refer to anesthesia documentation/charting for   details regarding medications administered and doses given.     Events  Procedure Events   Event Event Time   ENDO SCOPE IN TIME 8/4/2025  8:44 AM   ENDO CECUM REACHED 8/4/2025  8:48 AM   ENDO SCOPE OUT TIME 8/4/2025  8:55 AM     Specimens  No specimens collected    Procedure Location  [facility]  [address] [facility] [address] [phone]    Referring Provider  Roel Javier MD    Procedure Provider  DO Roel Vaughn       Assessment/Plan     Assessment & Plan  Diverticular hemorrhage  Multiple prior admissions for GI/diverticular bleeding in 2024, 2023, 2022 and 2014  Colonoscopy Sept 2024 revealed red blood and clots and extensive diverticulosis in the entire colon but no active bleeding, and no culprit lesion was identified    GI recommended no intervention during last admission in March  Baseline Hgb is 9-10  Consult GI, CLD for now, continue IV Protonix  8/4: Colonoscopy done today per GI.  No evidence of active bleeding.  Suspect diverticular bleed by history.  Will continue to monitor overnight.  If she bleeds again will need to come back to the hospital, if brisk enough may be candidate for IR.  Otherwise may need to consider surgical resection if this continues to recur.  Paroxysmal atrial fibrillation (Multi)  Pt was started on apixaban in March which was stopped after an admission two days later for GIB  She was advised to follow up with her cardiologist Dr. Queen for consideration of Watchman device but she cancelled the procedure after having doubts  I advised her to notify her cardiologist (which she says she hasn't) and strongly urged her to d/w him and reconsider the  procedure as it mitigates her risk for VTE in setting of recurrent GIB  Essential hypertension  Monitor BP on home meds  CAD (coronary artery disease)  Pt is s/p PCI in 2003 and underwent L&R heart cath 3/4/25 without intervention  Continue Lipitor, pt is no longer on atenolol  Acute blood loss anemia  Transfused 2 units of PRBCs.  Initial hemoglobin was 5.9.  Currently 9.9.        Sandor Oshea MD

## 2025-08-05 ENCOUNTER — DOCUMENTATION (OUTPATIENT)
Dept: INPATIENT UNIT | Facility: HOSPITAL | Age: 85
End: 2025-08-05
Payer: MEDICARE

## 2025-08-05 VITALS
DIASTOLIC BLOOD PRESSURE: 57 MMHG | OXYGEN SATURATION: 100 % | SYSTOLIC BLOOD PRESSURE: 137 MMHG | WEIGHT: 101 LBS | BODY MASS INDEX: 17.89 KG/M2 | HEIGHT: 63 IN | TEMPERATURE: 99 F | RESPIRATION RATE: 17 BRPM | HEART RATE: 85 BPM

## 2025-08-05 LAB
ANION GAP SERPL CALC-SCNC: 12 MMOL/L (ref 10–20)
ATRIAL RATE: 75 BPM
BUN SERPL-MCNC: 9 MG/DL (ref 6–23)
CALCIUM SERPL-MCNC: 7.6 MG/DL (ref 8.6–10.3)
CHLORIDE SERPL-SCNC: 107 MMOL/L (ref 98–107)
CO2 SERPL-SCNC: 23 MMOL/L (ref 21–32)
CREAT SERPL-MCNC: 0.81 MG/DL (ref 0.5–1.05)
EGFRCR SERPLBLD CKD-EPI 2021: 72 ML/MIN/1.73M*2
ERYTHROCYTE [DISTWIDTH] IN BLOOD BY AUTOMATED COUNT: 20.3 % (ref 11.5–14.5)
GLUCOSE SERPL-MCNC: 83 MG/DL (ref 74–99)
HCT VFR BLD AUTO: 28.2 % (ref 36–46)
HGB BLD-MCNC: 9.3 G/DL (ref 12–16)
MCH RBC QN AUTO: 28.4 PG (ref 26–34)
MCHC RBC AUTO-ENTMCNC: 33 G/DL (ref 32–36)
MCV RBC AUTO: 86 FL (ref 80–100)
NRBC BLD-RTO: 0 /100 WBCS (ref 0–0)
P AXIS: 25 DEGREES
P OFFSET: 162 MS
P ONSET: 138 MS
PLATELET # BLD AUTO: 220 X10*3/UL (ref 150–450)
POTASSIUM SERPL-SCNC: 3.7 MMOL/L (ref 3.5–5.3)
PR INTERVAL: 172 MS
Q ONSET: 224 MS
QRS COUNT: 12 BEATS
QRS DURATION: 84 MS
QT INTERVAL: 394 MS
QTC CALCULATION(BAZETT): 439 MS
QTC FREDERICIA: 424 MS
R AXIS: 126 DEGREES
RBC # BLD AUTO: 3.27 X10*6/UL (ref 4–5.2)
SODIUM SERPL-SCNC: 138 MMOL/L (ref 136–145)
T AXIS: 65 DEGREES
T OFFSET: 421 MS
VENTRICULAR RATE: 75 BPM
WBC # BLD AUTO: 6.4 X10*3/UL (ref 4.4–11.3)

## 2025-08-05 PROCEDURE — 2500000001 HC RX 250 WO HCPCS SELF ADMINISTERED DRUGS (ALT 637 FOR MEDICARE OP): Performed by: INTERNAL MEDICINE

## 2025-08-05 PROCEDURE — 36415 COLL VENOUS BLD VENIPUNCTURE: CPT | Performed by: INTERNAL MEDICINE

## 2025-08-05 PROCEDURE — 85027 COMPLETE CBC AUTOMATED: CPT | Performed by: INTERNAL MEDICINE

## 2025-08-05 PROCEDURE — 80048 BASIC METABOLIC PNL TOTAL CA: CPT | Performed by: INTERNAL MEDICINE

## 2025-08-05 PROCEDURE — 2500000002 HC RX 250 W HCPCS SELF ADMINISTERED DRUGS (ALT 637 FOR MEDICARE OP, ALT 636 FOR OP/ED): Performed by: INTERNAL MEDICINE

## 2025-08-05 PROCEDURE — 99232 SBSQ HOSP IP/OBS MODERATE 35: CPT | Performed by: NURSE PRACTITIONER

## 2025-08-05 PROCEDURE — 99239 HOSP IP/OBS DSCHRG MGMT >30: CPT | Performed by: INTERNAL MEDICINE

## 2025-08-05 RX ADMIN — FERROUS SULFATE TAB 325 MG (65 MG ELEMENTAL FE) 1 TABLET: 325 (65 FE) TAB at 08:05

## 2025-08-05 RX ADMIN — PANTOPRAZOLE SODIUM 40 MG: 40 TABLET, DELAYED RELEASE ORAL at 05:47

## 2025-08-05 RX ADMIN — METHIMAZOLE 5 MG: 5 TABLET ORAL at 08:05

## 2025-08-05 RX ADMIN — AMIODARONE HYDROCHLORIDE 200 MG: 200 TABLET ORAL at 08:05

## 2025-08-05 ASSESSMENT — COGNITIVE AND FUNCTIONAL STATUS - GENERAL
MOBILITY SCORE: 22
DAILY ACTIVITIY SCORE: 22
TOILETING: A LITTLE
DRESSING REGULAR LOWER BODY CLOTHING: A LITTLE
WALKING IN HOSPITAL ROOM: A LITTLE
CLIMB 3 TO 5 STEPS WITH RAILING: A LITTLE

## 2025-08-05 ASSESSMENT — PAIN - FUNCTIONAL ASSESSMENT: PAIN_FUNCTIONAL_ASSESSMENT: 0-10

## 2025-08-05 ASSESSMENT — PAIN SCALES - GENERAL: PAINLEVEL_OUTOF10: 0 - NO PAIN

## 2025-08-05 NOTE — CARE PLAN
Problem: Chronic Conditions and Co-morbidities  Goal: Patient's chronic conditions and co-morbidity symptoms are monitored and maintained or improved  Outcome: Progressing  Flowsheets (Taken 8/4/2025 7051)  Care Plan - Patient's Chronic Conditions and Co-Morbidity Symptoms are Monitored and Maintained or Improved:   Monitor and assess patient's chronic conditions and comorbid symptoms for stability, deterioration, or improvement   Collaborate with multidisciplinary team to address chronic and comorbid conditions and prevent exacerbation or deterioration   Update acute care plan with appropriate goals if chronic or comorbid symptoms are exacerbated and prevent overall improvement and discharge

## 2025-08-05 NOTE — PROGRESS NOTES
[facility] Progress Note    ASSESSMENT and PLAN:       Dianne Clay is a 85 y.o. female with a significant past medical history of CAD s/p PCI 2003, recurrent GI bleeds, HTN, PAF and anemia who presented yesterday to [facility] ED with an unwitnessed syncopal fall from standing up after she had a bloody BM and was straining.  wGI was consulted for GIB.       Diverticular bleed  Patient presented with rectal bleeding. CTA showed no active bleed. Colonoscopy 8/4/25 showed old blood in the sigmoid colon with no active bleeding. She has not had any further rectal bleeding since yesterday. Hgb stable. Suspect diverticular bleed that has stopped.   - if rebleeding occurs, recommend stat CTA and consult to IR       Case discussed with Dr. Demi Bill, MONET        SUBJECTIVE and INTERVAL HISTORY:       Dianne Clay is a 85 y.o. female with a significant past medical history of CAD s/p PCI 2003, recurrent GI bleeds, HTN, PAF and anemia who presented yesterday to [facility] ED with an unwitnessed syncopal fall from standing up after she had a bloody BM and was straining.  wGI was consulted for GIB.    HPI:  Patient seen and examined. No further rectal bleeding. No N/V, abdominal pain, melena. Feels ready to go home.         Review of systems:       I performed a complete 10 point review of systems and it is negative except as noted in HPI or above.    All other systems have been reviewed and are negative.          OBJECTIVE:       Last Recorded Vitals:  Vitals:    08/04/25 2022 08/05/25 0028 08/05/25 0426 08/05/25 0810   BP: 104/52 132/52 135/50 137/57   BP Location: Left arm Left arm Left arm Left arm   Patient Position: Lying Lying Lying Lying   Pulse: 80 74 69 85   Resp: 17 16 15 17   Temp: 37.3 °C (99.2 °F) 37 °C (98.6 °F) 36.1 °C (96.9 °F) 37.2 °C (99 °F)   TempSrc: Temporal Temporal Temporal Temporal   SpO2: 98% 94% 98% 100%   Weight:       Height:         /57 (BP Location:  "Left arm, Patient Position: Lying)   Pulse 85   Temp 37.2 °C (99 °F) (Temporal)   Resp 17   Ht 1.6 m (5' 3\")   Wt 45.8 kg (101 lb)   LMP  (LMP Unknown)   SpO2 100%   BMI 17.89 kg/m²      Physical Exam:    Physical Exam  Constitutional:       General: She is not in acute distress.     Appearance: Normal appearance.   HENT:      Mouth/Throat:      Mouth: Mucous membranes are moist.     Eyes:      Conjunctiva/sclera: Conjunctivae normal.      Pupils: Pupils are equal, round, and reactive to light.       Cardiovascular:      Rate and Rhythm: Normal rate and regular rhythm.      Heart sounds: No murmur heard.  Pulmonary:      Effort: Pulmonary effort is normal.      Breath sounds: Normal breath sounds.   Abdominal:      General: Bowel sounds are normal. There is no distension.      Palpations: Abdomen is soft.      Tenderness: There is no abdominal tenderness. There is no guarding.     Skin:     General: Skin is warm and dry.      Coloration: Skin is not jaundiced.     Neurological:      Mental Status: She is alert and oriented to person, place, and time.     Psychiatric:         Mood and Affect: Mood normal.         Behavior: Behavior normal.           Inpatient Medications:  Scheduled Medications[1]  PRN Medications[2]    Outpatient Medications:  Prior to Admission medications   Medication Sig Start Date End Date Taking? Authorizing Provider   amiodarone (Pacerone) 200 mg tablet Take 1 tablet by mouth once daily with a meal. 6/30/25   Jyoti Carbajal DO   atorvastatin (Lipitor) 40 mg tablet Take 1 tablet (40 mg) by mouth once daily. 7/31/25   Devonte Lizarraga, APRN-CNP   FeroSuL tablet TAKE 1 TABLET BY MOUTH DAILY WITH FOOD 12/30/24   Jyoti Carbajal DO   hydrocortisone (Anusol-HC) 2.5 % rectal cream Insert into the rectum twice a day. 7/16/25   Historical Provider, MD   methIMAzole (Tapazole) 5 mg tablet TAKE 1 TABLET BY MOUTH ONCE DAILY. 12/30/24   Jyoti Carbajal DO   pantoprazole (ProtoNix) 40 " "mg EC tablet TAKE 1 TABLET BY MOUTH ONCE DAILY 6/30/25   Jyoti Carbajal DO   acetaminophen (Tylenol) 500 mg tablet Take 2 tablets (1,000 mg) by mouth once daily as needed for mild pain (1 - 3).  8/3/25  Historical Provider, MD   atorvastatin (Lipitor) 40 mg tablet Take 1 tablet (40 mg) by mouth once daily. 6/30/25 7/31/25  Jyoti Carbajal DO       LABS AND IMAGING:     Labs:  Recent labs reviewed in the EMR.    Lab Results   Component Value Date    WBC 6.4 08/05/2025    HGB 9.3 (L) 08/05/2025    HGB 9.5 (L) 08/04/2025    HGB 9.9 (L) 08/04/2025    MCV 86 08/05/2025     08/05/2025     08/04/2025     08/04/2025       Lab Results   Component Value Date     08/05/2025    K 3.7 08/05/2025     08/05/2025    BUN 9 08/05/2025    CREATININE 0.81 08/05/2025    CREATININE 0.66 08/04/2025       No results found for: \"BILITOT\", \"BILIDIR\", \"ALKPHOS\", \"AST\", \"ALT\", \"LIPASE\"    No results found for: \"CRP\", \"CALPS\"      Imaging:  Recent imaging results reviewed.            [1] amiodarone, 200 mg, oral, Daily  atorvastatin, 40 mg, oral, Nightly  ferrous sulfate, 65 mg of elemental iron, oral, Daily with breakfast  methIMAzole, 5 mg, oral, Daily  pantoprazole, 40 mg, oral, Daily before breakfast  [2] PRN medications: acetaminophen    "

## 2025-08-05 NOTE — DISCHARGE INSTRUCTIONS
Follow up with PCP in 1-2 weeks. Call to schedule. Bring all your discharge paperwork and medications when you go to your follow up appointment. Return to ED if your symptoms come back.    Follow-up with cardiology, inquire about placement of Watchman.

## 2025-08-05 NOTE — ASSESSMENT & PLAN NOTE
Pt was started on apixaban in March which was stopped after an admission two days later for GIB  She was advised to follow up with her cardiologist Dr. Queen for consideration of Watchman device but she cancelled the procedure after having doubts  I advised her to notify her cardiologist (which she says she hasn't) and strongly urged her to d/w him and reconsider the procedure as it mitigates her risk for VTE in setting of recurrent GIB  8/5: States she was in the process of discussing a watchman when she decided to avoid this.  Will go back to cardiology and reinitiate the process.

## 2025-08-05 NOTE — ASSESSMENT & PLAN NOTE
Transfused 2 units of PRBCs.  Initial hemoglobin was 5.9.  Currently 9.9.  8/5: Hemoglobin today is 9.3, likely represents equalization.  Okay to discharge to home.

## 2025-08-05 NOTE — DISCHARGE SUMMARY
Discharge Diagnosis  Assessment & Plan  Diverticular hemorrhage  Multiple prior admissions for GI/diverticular bleeding in 2024, 2023, 2022 and 2014  Colonoscopy Sept 2024 revealed red blood and clots and extensive diverticulosis in the entire colon but no active bleeding, and no culprit lesion was identified    GI recommended no intervention during last admission in March  Baseline Hgb is 9-10  Consult GI, CLD for now, continue IV Protonix  8/4: Colonoscopy done today per GI.  No evidence of active bleeding.  Suspect diverticular bleed by history.  Will continue to monitor overnight.  If she bleeds again will need to come back to the hospital, if brisk enough may be candidate for IR.  Otherwise may need to consider surgical resection if this continues to recur.  8/5: Patient seen.  Hemoglobin stable at 9.3.  Slight drop likely represents equalization.  No evidence of active bleeding.  Continue to hold anticoagulation for now but may need to consider a few months of anticoagulation if proceeding with watchman.  Paroxysmal atrial fibrillation (Multi)  Pt was started on apixaban in March which was stopped after an admission two days later for GIB  She was advised to follow up with her cardiologist Dr. Queen for consideration of Watchman device but she cancelled the procedure after having doubts  I advised her to notify her cardiologist (which she says she hasn't) and strongly urged her to d/w him and reconsider the procedure as it mitigates her risk for VTE in setting of recurrent GIB  8/5: States she was in the process of discussing a watchman when she decided to avoid this.  Will go back to cardiology and reinitiate the process.  Essential hypertension  Monitor BP on home meds  CAD (coronary artery disease)  Pt is s/p PCI in 2003 and underwent L&R heart cath 3/4/25 without intervention  Continue Lipitor, pt is no longer on atenolol  Acute blood loss anemia  Transfused 2 units of PRBCs.  Initial hemoglobin was  5.9.  Currently 9.9.  8/5: Hemoglobin today is 9.3, likely represents equalization.  Okay to discharge to home.         Issues Requiring Follow-Up  Follow-up PCP in 1 to 2 weeks.  Encourage soluble fiber in diet.  Follow-up with cardiology for possible watchman.    Test Results Pending At Discharge  Pending Labs       No current pending labs.            Hospital Course   Dianne Clay is an 85 y.o. female with PMHx of CAD s/p PCI 2003, recurrent GI bleeds, HTN, PAF and anemia who presented yesterday to [facility] ED with an unwitnessed syncopal fall from standing up after she had a bloody BM and was straining. She had a BM at 0800 yesterday and noted light red blood. Over the course of the next 8-9 hours she had 4 more episodes of blood BMs. She noted darkening blood and some dark as well as light-colored stool. The last time she remembered feeling faint then waking on floor, she believes, moments later but unsure of time. She did strike her head and sustained a slight abrasion and edema to left eyebrow. She was admitted for GIB in March and apixaban was stopped (was just started 2 days earlier). Since then she had one episode of BRBPR and went to [facility] ED on 7/16/25. Her Hgb was 11.2 and she was discharged with Anusol for hemorrhoids. Remainder of ROS reviewed and negative except as indicated in HPI.      8/4: Patient seen today.  No new complaints.  No further bleeding noted.  No chest pain or shortness of breath.  Colonoscopy done today per GI.  No evidence of active bleeding were noted.  GI suspects patient may have acute diverticular bleeding.  If this continues to recur patient may consider surgical resection.  Otherwise if bleeding is brisk enough may need to consider IR intervention.  At this time patient appears to be stable after 2 units.  Will observe patient overnight, if otherwise stable can consider discharge tomorrow.  Patient is urged to discuss with cardiology whether she may be a candidate for a  Watchman procedure.    8/5: Patient seen.  No further bleeding.  Hemoglobin stable at 9.3.  Okay to discharge to home and follow-up with PCP as outpatient.  Recommend increasing soluble fiber in diet.  Recommend follow-up with cardiology, may want to reexplore Watchman procedure.  Patient states she initiated workup as outpatient but declined to proceed.  States she will consider it again.  Will likely need to be anticoagulated for about 2 months after device is placed.    Pertinent Physical Exam At Time of Discharge  Physical Exam  Constitutional:       General: She is not in acute distress.  HENT:      Head: Normocephalic and atraumatic.      Nose: Nose normal. No congestion or rhinorrhea.      Mouth/Throat:      Mouth: Mucous membranes are dry.      Pharynx: Oropharynx is clear.     Eyes:      General: No scleral icterus.     Extraocular Movements: Extraocular movements intact.      Pupils: Pupils are equal, round, and reactive to light.       Cardiovascular:      Rate and Rhythm: Normal rate and regular rhythm.      Heart sounds: Normal heart sounds. No murmur heard.     No friction rub. No gallop.   Pulmonary:      Effort: Pulmonary effort is normal.      Breath sounds: Normal breath sounds. No wheezing, rhonchi or rales.   Chest:      Chest wall: No tenderness.   Abdominal:      General: There is no distension.      Palpations: Abdomen is soft.      Tenderness: There is no abdominal tenderness. There is no guarding or rebound.     Musculoskeletal:         General: No swelling, tenderness or signs of injury. Normal range of motion.      Cervical back: Normal range of motion.     Skin:     General: Skin is warm and dry.     Neurological:      General: No focal deficit present.      Mental Status: She is alert and oriented to person, place, and time.         Home Medications     Medication List      CONTINUE taking these medications     amiodarone 200 mg tablet; Commonly known as: Pacerone; Take 1 tablet by    mouth once daily with a meal.   atorvastatin 40 mg tablet; Commonly known as: Lipitor; Take 1 tablet (40   mg) by mouth once daily.   FeroSuL 325 mg (65 mg iron) tablet; Generic drug: ferrous sulfate; TAKE   1 TABLET BY MOUTH DAILY WITH FOOD   hydrocortisone 2.5 % rectal cream; Commonly known as: Anusol-HC   methIMAzole 5 mg tablet; Commonly known as: Tapazole; TAKE 1 TABLET BY   MOUTH ONCE DAILY.   pantoprazole 40 mg EC tablet; Commonly known as: ProtoNix; TAKE 1 TABLET   BY MOUTH ONCE DAILY       Outpatient Follow-Up  Future Appointments   Date Time Provider Department Center   8/18/2025  9:45 AM DO [id number]       Sandor Oshea MD

## 2025-08-05 NOTE — ASSESSMENT & PLAN NOTE
Multiple prior admissions for GI/diverticular bleeding in 2024, 2023, 2022 and 2014  Colonoscopy Sept 2024 revealed red blood and clots and extensive diverticulosis in the entire colon but no active bleeding, and no culprit lesion was identified    GI recommended no intervention during last admission in March  Baseline Hgb is 9-10  Consult GI, CLD for now, continue IV Protonix  8/4: Colonoscopy done today per GI.  No evidence of active bleeding.  Suspect diverticular bleed by history.  Will continue to monitor overnight.  If she bleeds again will need to come back to the hospital, if brisk enough may be candidate for IR.  Otherwise may need to consider surgical resection if this continues to recur.  8/5: Patient seen.  Hemoglobin stable at 9.3.  Slight drop likely represents equalization.  No evidence of active bleeding.  Continue to hold anticoagulation for now but may need to consider a few months of anticoagulation if proceeding with watchman.

## 2025-08-06 DIAGNOSIS — K21.9 GASTROESOPHAGEAL REFLUX DISEASE WITHOUT ESOPHAGITIS: ICD-10-CM

## 2025-08-06 DIAGNOSIS — D50.9 IRON DEFICIENCY ANEMIA, UNSPECIFIED IRON DEFICIENCY ANEMIA TYPE: ICD-10-CM

## 2025-08-06 RX ORDER — PANTOPRAZOLE SODIUM 40 MG/1
40 TABLET, DELAYED RELEASE ORAL DAILY
Qty: 30 TABLET | Refills: 0 | Status: SHIPPED | OUTPATIENT
Start: 2025-08-06

## 2025-08-14 DIAGNOSIS — I48.91 ATRIAL FIBRILLATION, UNSPECIFIED TYPE (MULTI): ICD-10-CM

## 2025-08-14 RX ORDER — AMIODARONE HYDROCHLORIDE 200 MG/1
200 TABLET ORAL DAILY
Qty: 30 TABLET | Refills: 0 | Status: SHIPPED | OUTPATIENT
Start: 2025-08-14

## 2025-08-18 ENCOUNTER — APPOINTMENT (OUTPATIENT)
Dept: PRIMARY CARE | Facility: CLINIC | Age: 85
End: 2025-08-18
Payer: MEDICARE

## 2025-08-25 ENCOUNTER — APPOINTMENT (OUTPATIENT)
Dept: CARDIOLOGY | Facility: CLINIC | Age: 85
End: 2025-08-25
Payer: MEDICARE

## 2025-08-25 VITALS
HEART RATE: 74 BPM | DIASTOLIC BLOOD PRESSURE: 81 MMHG | HEIGHT: 63 IN | WEIGHT: 112 LBS | SYSTOLIC BLOOD PRESSURE: 185 MMHG | BODY MASS INDEX: 19.84 KG/M2 | OXYGEN SATURATION: 98 %

## 2025-08-25 DIAGNOSIS — I48.0 PAROXYSMAL ATRIAL FIBRILLATION (MULTI): Primary | ICD-10-CM

## 2025-08-25 PROCEDURE — 1111F DSCHRG MED/CURRENT MED MERGE: CPT | Performed by: INTERNAL MEDICINE

## 2025-08-25 PROCEDURE — 3079F DIAST BP 80-89 MM HG: CPT | Performed by: INTERNAL MEDICINE

## 2025-08-25 PROCEDURE — 1036F TOBACCO NON-USER: CPT | Performed by: INTERNAL MEDICINE

## 2025-08-25 PROCEDURE — 99215 OFFICE O/P EST HI 40 MIN: CPT | Performed by: INTERNAL MEDICINE

## 2025-08-25 PROCEDURE — 1159F MED LIST DOCD IN RCRD: CPT | Performed by: INTERNAL MEDICINE

## 2025-08-25 PROCEDURE — 3077F SYST BP >= 140 MM HG: CPT | Performed by: INTERNAL MEDICINE

## 2025-08-27 ENCOUNTER — HOSPITAL ENCOUNTER (OUTPATIENT)
Dept: CARDIOLOGY | Facility: HOSPITAL | Age: 85
Discharge: HOME | End: 2025-08-27
Payer: MEDICARE

## 2025-08-27 DIAGNOSIS — I48.0 PAROXYSMAL ATRIAL FIBRILLATION (MULTI): ICD-10-CM

## 2025-08-27 LAB
ATRIAL RATE: 68 BPM
P AXIS: 77 DEGREES
P OFFSET: 167 MS
P ONSET: 124 MS
PR INTERVAL: 200 MS
Q ONSET: 224 MS
QRS COUNT: 12 BEATS
QRS DURATION: 94 MS
QT INTERVAL: 440 MS
QTC CALCULATION(BAZETT): 467 MS
QTC FREDERICIA: 459 MS
R AXIS: 180 DEGREES
T AXIS: 61 DEGREES
T OFFSET: 444 MS
VENTRICULAR RATE: 68 BPM

## 2025-08-27 PROCEDURE — 93005 ELECTROCARDIOGRAM TRACING: CPT

## 2025-09-12 ENCOUNTER — APPOINTMENT (OUTPATIENT)
Dept: PRIMARY CARE | Facility: CLINIC | Age: 85
End: 2025-09-12
Payer: MEDICARE

## 2026-02-23 ENCOUNTER — APPOINTMENT (OUTPATIENT)
Dept: CARDIOLOGY | Facility: CLINIC | Age: 86
End: 2026-02-23
Payer: MEDICARE

## (undated) DEVICE — GUIDEWIRE, INQWIRE, 3MM J, .035 X 210CM, FIXED

## (undated) DEVICE — CATHETER, WEDGE PRESSURE, BALLOON, DOUBLE LUMEN, 5 FR, 110 CM

## (undated) DEVICE — TR BAND, RADIAL COMPRESSION, STANDARD, 24CM

## (undated) DEVICE — NITINOL KIT, GLIDESHEATH, 6FR

## (undated) DEVICE — GUIDEWIRE, MANDRIL, COPE, W/NITINOL, 0.018 IN X 60 CM

## (undated) DEVICE — CATHETER, OPTITORQUE, 5FR, TIG, 1H/100CM

## (undated) DEVICE — CATHETER, ANGIO, IMPULSE, PIG 155 DEG, 5 FR X 110 CM

## (undated) DEVICE — INTRODUCER, GLIDESHEATH SLENDER A-KIT, 5FR 10CM